# Patient Record
Sex: MALE | Race: WHITE | NOT HISPANIC OR LATINO | Employment: FULL TIME | URBAN - METROPOLITAN AREA
[De-identification: names, ages, dates, MRNs, and addresses within clinical notes are randomized per-mention and may not be internally consistent; named-entity substitution may affect disease eponyms.]

---

## 2018-03-07 ENCOUNTER — OFFICE VISIT (OUTPATIENT)
Dept: URGENT CARE | Facility: RETAIL CLINIC | Age: 58
End: 2018-03-07

## 2018-03-07 VITALS
OXYGEN SATURATION: 96 % | DIASTOLIC BLOOD PRESSURE: 102 MMHG | SYSTOLIC BLOOD PRESSURE: 162 MMHG | TEMPERATURE: 97.8 F | HEART RATE: 81 BPM

## 2018-03-07 DIAGNOSIS — R03.0 ELEVATED BLOOD PRESSURE READING: ICD-10-CM

## 2018-03-07 DIAGNOSIS — J20.9 ACUTE BRONCHITIS WITH SYMPTOMS > 10 DAYS: Primary | ICD-10-CM

## 2018-03-07 DIAGNOSIS — J98.01 ACUTE BRONCHOSPASM: ICD-10-CM

## 2018-03-07 PROCEDURE — 99203 OFFICE O/P NEW LOW 30 MIN: CPT | Performed by: PHYSICIAN ASSISTANT

## 2018-03-07 RX ORDER — PREDNISONE 20 MG/1
40 TABLET ORAL DAILY
Qty: 10 TABLET | Refills: 0 | Status: SHIPPED | OUTPATIENT
Start: 2018-03-07 | End: 2018-03-12

## 2018-03-07 RX ORDER — ALBUTEROL SULFATE 90 UG/1
1-2 AEROSOL, METERED RESPIRATORY (INHALATION) EVERY 4 HOURS PRN
Qty: 1 INHALER | Refills: 0 | Status: SHIPPED | OUTPATIENT
Start: 2018-03-07 | End: 2018-07-11

## 2018-03-07 RX ORDER — DOXYCYCLINE 100 MG/1
100 CAPSULE ORAL 2 TIMES DAILY
Qty: 14 CAPSULE | Refills: 0 | Status: SHIPPED | OUTPATIENT
Start: 2018-03-07 | End: 2018-03-14

## 2018-03-07 RX ORDER — CODEINE PHOSPHATE AND GUAIFENESIN 10; 100 MG/5ML; MG/5ML
1-2 SOLUTION ORAL EVERY 6 HOURS PRN
Qty: 120 ML | Refills: 0 | Status: SHIPPED | OUTPATIENT
Start: 2018-03-07 | End: 2018-04-10

## 2018-03-07 NOTE — MR AVS SNAPSHOT
"              After Visit Summary   3/7/2018    Alexsander Sin    MRN: [id number]           Patient Information     Date Of Birth          1960        Visit Information        Provider Department      3/7/2018 5:20 PM Pao Gauthier PA-C [facility]        Today's Diagnoses     Acute bronchitis with symptoms > 10 days    -  1    Acute bronchospasm        Elevated blood pressure reading          Care Instructions    1. Take antibiotic as directed  2. Take oral steroid/ prednisone 2 tablets once a day 5 days- start tonight, then move dose to tomorrow morning - Take with food  3. Use albuterol inhaler as needed for wheezing  4. Codeine cough suppressant - do not drive for 6 hrs after taking  Fluids, rest, cough drops, humidifier, over the counter pain relievers as needed  Space ibuprofen/Advilaleve/aspirin at least 2 hrs apart from prednisone to avoid any stomach upset/GI issues  Please follow up with primary care provider if not improving, worsening or new symptoms or for any adverse reactions to medications.   May need chest xray if persistent symptoms.           Follow-ups after your visit        Who to contact     You can reach your care team any time of the day by calling [phone].  Notification of test results:  If you have an abnormal lab result, we will notify you by phone as soon as possible.         Additional Information About Your Visit        Apps [employer] Information     [employer] lets you send messages to your doctor, view your test results, renew your prescriptions, schedule appointments and more. To sign up, go to [email or web address] . Click on \"Log in\" on the left side of the screen, which will take you to the Welcome page. Then click on \"Sign up Now\" on the right side of the page.     You will be asked to enter the access code listed below, as well as some personal information. Please follow the directions to create your username and password.     Your access code " is: [email or web address]  Expires: 2018  6:02 PM     Your access code will  in 90 days. If you need help or a new code, please call your [place] clinic or [phone].        Care EveryWhere ID     This is your Care EveryWhere ID. This could be used by other organizations to access your [place] medical records  [id number]        Your Vitals Were     Pulse Temperature Pulse Oximetry             81 97.8  F (36.6  C) (Temporal) 96%          Blood Pressure from Last 3 Encounters:   18 (!) 162/102   12 124/72    Weight from Last 3 Encounters:   12 213 lb (96.6 kg)              Today, you had the following     No orders found for display         Today's Medication Changes          These changes are accurate as of 3/7/18  6:03 PM.  If you have any questions, ask your nurse or doctor.               Start taking these medicines.        Dose/Directions    albuterol 108 (90 BASE) MCG/ACT Inhaler   Commonly known as:  PROAIR HFA/PROVENTIL HFA/VENTOLIN HFA   Used for:  Acute bronchospasm        Dose:  1-2 puff   Inhale 1-2 puffs into the lungs every 4 hours as needed For wheezing   Quantity:  1 Inhaler   Refills:  0       doxycycline 100 MG capsule   Commonly known as:  VIBRAMYCIN   Used for:  Acute bronchitis with symptoms > 10 days        Dose:  100 mg   Take 1 capsule (100 mg) by mouth 2 times daily for 7 days   Quantity:  14 capsule   Refills:  0       guaiFENesin-codeine 100-10 MG/5ML Soln solution   Commonly known as:  ROBITUSSIN AC   Used for:  Acute bronchospasm        Dose:  1-2 tsp.   Take 5-10 mLs by mouth every 6 hours as needed for cough   Quantity:  120 mL   Refills:  0       predniSONE 20 MG tablet   Commonly known as:  DELTASONE   Used for:  Acute bronchitis with symptoms > 10 days        Dose:  40 mg   Take 2 tablets (40 mg) by mouth daily for 5 days   Quantity:  10 tablet   Refills:  0            Where to get your medicines      These medications were sent to [id number] [place], [place]  - [address] [facility]  [address] [place] [id number]    Hours:  Formerly Delicia - alex unchanged   9/8/03 kr Phone:  [phone]     albuterol 108 (90 BASE) MCG/ACT Inhaler    doxycycline 100 MG capsule    predniSONE 20 MG tablet         Some of these will need a paper prescription and others can be bought over the counter.  Ask your nurse if you have questions.     Bring a paper prescription for each of these medications     guaiFENesin-codeine 100-10 MG/5ML Soln solution                Primary Care Provider Fax #    Physician No Ref-Primary [phone]       No address on file        Equal Access to Services     [facility]: Hadii nickie gold Sodesi, waaxda luqadaha, qaybta kaalmada rubin, alfonzo robles . So [facility] [phone].    ATENCIÓN: Si habla español, tiene a jackson disposición servicios gratuitos de asistencia lingüística. [facility] [phone].    We comply with applicable federal civil rights laws and [place] laws. We do not discriminate on the basis of race, color, national origin, age, disability, sex, sexual orientation, or gender identity.            Thank you!     Thank you for choosing [facility]  for your care. Our goal is always to provide you with excellent care. Hearing back from our patients is one way we can continue to improve our services. Please take a few minutes to complete the written survey that you may receive in the mail after your visit with us. Thank you!             Your Updated Medication List - Protect others around you: Learn how to safely use, store and throw away your medicines at www.disposemymeds.org.          This list is accurate as of 3/7/18  6:03 PM.  Always use your most recent med list.                   Brand Name Dispense Instructions for use Diagnosis    albuterol 108 (90 BASE) MCG/ACT Inhaler    PROAIR HFA/PROVENTIL HFA/VENTOLIN HFA    1 Inhaler    Inhale 1-2 puffs into the lungs every 4 hours as needed  For wheezing    Acute bronchospasm       doxycycline 100 MG capsule    VIBRAMYCIN    14 capsule    Take 1 capsule (100 mg) by mouth 2 times daily for 7 days    Acute bronchitis with symptoms > 10 days       guaiFENesin-codeine 100-10 MG/5ML Soln solution    ROBITUSSIN AC    120 mL    Take 5-10 mLs by mouth every 6 hours as needed for cough    Acute bronchospasm       lisinopril 10 MG tablet    PRINIVIL/ZESTRIL     Take 10 mg by mouth daily.        predniSONE 20 MG tablet    DELTASONE    10 tablet    Take 2 tablets (40 mg) by mouth daily for 5 days    Acute bronchitis with symptoms > 10 days

## 2018-03-07 NOTE — PROGRESS NOTES
Chief Complaint   Patient presents with     Cough     x 2 weeks, having a hard time breathing, productive cough, green yellow phlegm, cough keeping him up at night, no fevers     SUBJECTIVE:  Alexsander Sin is a 57 year old male who presents to the clinic today with a chief complaint of cough  for 2 week(s).  His cough is described as persistent.    The patient's symptoms are moderate and worsening.  Associated symptoms include headache, wheezing intermittently. The patient's symptoms are exacerbated by lying down  Patient has been using OTC cough suppressants  to improve symptoms.  HTN - was on lisinopril in past, has been in to [facility] for awhile to refill meds    Past Medical History:   Diagnosis Date     Hypertension        Meds - none       Allergies   Allergen Reactions     Sulfa Drugs         History   Smoking Status     Never Smoker   Smokeless Tobacco     Not on file       ROS  CONSTITUTIONAL:NEGATIVE for fever, chills, change in weight  ENT/MOUTH: NEGATIVE for ear, mouth and throat problems  RESP:POSITIVE for cough-productive and wheezing    OBJECTIVE:  BP (!) 162/102  Pulse 81  Temp 97.8  F (36.6  C) (Temporal)  SpO2 96%  GENERAL APPEARANCE: alert, mildly ill appearing  EYES:conjunctiva clear  HENT: ear canals and TM's normal.  Nose and mouth without ulcers, erythema or lesions  NECK: supple, nontender, no lymphadenopathy  RESP: upper lobes inspir wheezing and exp rhonchi, no rales  CV: regular rates and rhythm, normal S1 S2, no murmur noted  SKIN: no suspicious lesions or rashes    ASSESSMENT:    (J20.9) Acute bronchitis with symptoms > 10 days  (primary encounter diagnosis)  (J98.01) Acute bronchospasm  (R03.0) Elevated blood pressure reading    PLAN:   predniSONE (DELTASONE) 20 MG tablet,   doxycycline (VIBRAMYCIN) 100 MG capsule   albuterol (PROAIR HFA/PROVENTIL HFA/VENTOLIN HFA) MCG/ACT Inhaler,   guaiFENesin-codeine (ROBITUSSIN AC) 100-10 MG/5ML SOLN solution  1. Take antibiotic as directed  2.  Take oral steroid/ prednisone 2 tablets once a day 5 days- start tonight, then move dose to tomorrow morning - Take with food  3. Use albuterol inhaler as needed for wheezing  4. Codeine cough suppressant - do not drive for 6 hrs after taking  Fluids, rest, cough drops, humidifier, over the counter pain relievers as needed  Space ibuprofen/Advilaleve/aspirin at least 2 hrs apart from prednisone to avoid any stomach upset/GI issues  Please follow up with primary care provider if not improving, worsening or new symptoms or for any adverse reactions to medications.   May need chest xray if persistent symptoms.    Discussed seeing PCP and restarting lisinopril    Pao Gauthier PA-C  [facility]

## 2018-03-07 NOTE — PATIENT INSTRUCTIONS
1. Take antibiotic as directed  2. Take oral steroid/ prednisone 2 tablets once a day 5 days- start tonight, then move dose to tomorrow morning - Take with food  3. Use albuterol inhaler as needed for wheezing  4. Codeine cough suppressant - do not drive for 6 hrs after taking  Fluids, rest, cough drops, humidifier, over the counter pain relievers as needed  Space ibuprofen/Advilaleve/aspirin at least 2 hrs apart from prednisone to avoid any stomach upset/GI issues  Please follow up with primary care provider if not improving, worsening or new symptoms or for any adverse reactions to medications.   May need chest xray if persistent symptoms.

## 2018-03-07 NOTE — NURSING NOTE
"Chief Complaint   Patient presents with     Cough     x 2 weeks, having a hard time breathing, productive cough, green yellow phlegm, cough keeping him up at night, no fevers       Initial BP (!) 171/98 (BP Location: Left arm)  Pulse 81  Temp 97.8  F (36.6  C) (Temporal)  SpO2 96% Estimated body mass index is 30.56 kg/(m^2) as calculated from the following:    Height as of 1/23/12: 5' 10\" (1.778 m).    Weight as of 1/23/12: 213 lb (96.6 kg).  Medication Reconciliation: complete    "

## 2018-03-18 ENCOUNTER — TELEPHONE (OUTPATIENT)
Dept: URGENT CARE | Facility: RETAIL CLINIC | Age: 58
End: 2018-03-18

## 2018-03-18 NOTE — TELEPHONE ENCOUNTER
"PATIENT SIGNED IN TO [facility] THIS MORNING FOR COUGH, WHICH HE INFORMED ME THAT HE WAS VERY RECENTLY TREATED FOR AND HAS NOT IMPROVED.   I ADVISED HIM TO BE SEEN AT A HIGHER CARE FACILITY SUCH AS AN URGENT CARE AS WE ARE NOT ABLE TO FURTHER TREAT HIM AT [facility].   AS HE WALKED AWAY, HE STATED \"NO, THAT MAYBE IT WILL GO AWAY ON ITS OWN.\"  "

## 2018-04-10 ENCOUNTER — OFFICE VISIT (OUTPATIENT)
Dept: INTERNAL MEDICINE | Facility: CLINIC | Age: 58
End: 2018-04-10

## 2018-04-10 VITALS
RESPIRATION RATE: 16 BRPM | OXYGEN SATURATION: 96 % | HEIGHT: 70 IN | BODY MASS INDEX: 31.21 KG/M2 | WEIGHT: 218 LBS | SYSTOLIC BLOOD PRESSURE: 168 MMHG | DIASTOLIC BLOOD PRESSURE: 104 MMHG | TEMPERATURE: 97.5 F | HEART RATE: 84 BPM

## 2018-04-10 DIAGNOSIS — J40 BRONCHITIS: ICD-10-CM

## 2018-04-10 DIAGNOSIS — R05.3 CHRONIC COUGH: Primary | ICD-10-CM

## 2018-04-10 DIAGNOSIS — I10 BENIGN ESSENTIAL HYPERTENSION: ICD-10-CM

## 2018-04-10 PROCEDURE — 99213 OFFICE O/P EST LOW 20 MIN: CPT | Performed by: INTERNAL MEDICINE

## 2018-04-10 RX ORDER — AZITHROMYCIN 250 MG/1
TABLET, FILM COATED ORAL
Qty: 6 TABLET | Refills: 0 | Status: SHIPPED | OUTPATIENT
Start: 2018-04-10 | End: 2018-06-13

## 2018-04-10 RX ORDER — LISINOPRIL 20 MG/1
20 TABLET ORAL DAILY
COMMUNITY
End: 2018-04-10

## 2018-04-10 RX ORDER — LISINOPRIL 20 MG/1
30 TABLET ORAL DAILY
Qty: 45 TABLET | Refills: 3 | Status: SHIPPED | OUTPATIENT
Start: 2018-04-10 | End: 2018-06-13

## 2018-04-10 RX ORDER — PREDNISONE 20 MG/1
TABLET ORAL
Qty: 15 TABLET | Refills: 0 | Status: SHIPPED | OUTPATIENT
Start: 2018-04-10 | End: 2018-06-13

## 2018-04-10 ASSESSMENT — PAIN SCALES - GENERAL: PAINLEVEL: NO PAIN (0)

## 2018-04-10 NOTE — MR AVS SNAPSHOT
"              After Visit Summary   4/10/2018    Alexsander Sin    MRN: [id number]           Patient Information     Date Of Birth          1960        Visit Information        Provider Department      4/10/2018 2:00 PM Jarvis Bahena MD [facility]         Follow-ups after your visit        Who to contact     If you have questions or need follow up information about today's clinic visit or your schedule please contact [facility] directly at [phone].  Normal or non-critical lab and imaging results will be communicated to you by MyChart, letter or phone within 4 business days after the clinic has received the results. If you do not hear from us within 7 days, please contact the clinic through [employer] or phone. If you have a critical or abnormal lab result, we will notify you by phone as soon as possible.  Submit refill requests through [employer] or call your pharmacy and they will forward the refill request to us. Please allow 3 business days for your refill to be completed.          Additional Information About Your Visit        [facility] Information     [employer] lets you send messages to your doctor, view your test results, renew your prescriptions, schedule appointments and more. To sign up, go to [email or web address] . Click on \"Log in\" on the left side of the screen, which will take you to the Welcome page. Then click on \"Sign up Now\" on the right side of the page.     You will be asked to enter the access code listed below, as well as some personal information. Please follow the directions to create your username and password.     Your access code is: [email or web address]  Expires: 2018  7:02 PM     Your access code will  in 90 days. If you need help or a new code, please call your [facility] or [phone].        Care EveryWhere ID     This is your Care EveryWhere ID. This could be used by other organizations to access your [place] medical " "records  [id number]        Your Vitals Were     Pulse Temperature Respirations Height Pulse Oximetry BMI (Body Mass Index)    84 97.5  F (36.4  C) (Temporal) 16 5' 10\" (1.778 m) 96% 31.28 kg/m2       Blood Pressure from Last 3 Encounters:   04/10/18 (!) 168/104   03/07/18 (!) 162/102   01/23/12 124/72    Weight from Last 3 Encounters:   04/10/18 218 lb (98.9 kg)   01/23/12 213 lb (96.6 kg)              Today, you had the following     No orders found for display         Today's Medication Changes          These changes are accurate as of 4/10/18  2:05 PM.  If you have any questions, ask your nurse or doctor.               These medicines have changed or have updated prescriptions.        Dose/Directions    lisinopril 20 MG tablet   Commonly known as:  PRINIVIL/ZESTRIL   This may have changed:  Another medication with the same name was removed. Continue taking this medication, and follow the directions you see here.   Changed by:  Jarvis Bahena MD        Dose:  20 mg   Take 20 mg by mouth daily   Refills:  0                Primary Care Provider Fax #    Physician No Ref-Primary [phone]       No address on file        Equal Access to Services     OLESYA GORMAN : Chana meyero Sodesi, waaxda luqadaha, qaybta kaalmada adeegyada, alfonzo carrion. So [facility] [phone].    ATENCIÓN: Si habla español, tiene a jackson disposición servicios gratuitos de asistencia lingüística. Llame al [phone].    We comply with applicable federal civil rights laws and [place] laws. We do not discriminate on the basis of race, color, national origin, age, disability, sex, sexual orientation, or gender identity.            Thank you!     Thank you for choosing [facility]  for your care. Our goal is always to provide you with excellent care. Hearing back from our patients is one way we can continue to improve our services. Please take a few minutes to complete the written survey that " you may receive in the mail after your visit with us. Thank you!             Your Updated Medication List - Protect others around you: Learn how to safely use, store and throw away your medicines at www.disposemymeds.org.          This list is accurate as of 4/10/18  2:05 PM.  Always use your most recent med list.                   Brand Name Dispense Instructions for use Diagnosis    albuterol 108 (90 Base) MCG/ACT Inhaler    PROAIR HFA/PROVENTIL HFA/VENTOLIN HFA    1 Inhaler    Inhale 1-2 puffs into the lungs every 4 hours as needed For wheezing    Acute bronchospasm       GAVISCON PO      Take by mouth daily as needed        lisinopril 20 MG tablet    PRINIVIL/ZESTRIL     Take 20 mg by mouth daily        ROBITUSSIN DM PO      Take by mouth as needed

## 2018-04-10 NOTE — PROGRESS NOTES
"  SUBJECTIVE:   Alexsander Sin is a 57 year old male who presents to clinic today for the following health issues:    Chief Complaint   Patient presents with     Cough     follow up on chronic cough     Throat Problem     horseness     He has some bronchitis and cough ongoing.      He has high blood pressure and was on lisinopril before, still on it now, needs some refill.  Checking bp at home and is better there.   Tried stopping it once and cough didn't get better.     Cough since early December, first with nose congestion, that cleared up in mid January, cough continued.  February had more chest congestion, [facility]ine and prednisone and inhaler, didn't do much for him.      [facility] did chest xray and more medrol dose pack.  Got a little better.      Still nagging and hard to sleep. Some foamy white phelgm, not the yellow or green.     Some issues with eating and coughing as well.  Tried some antacids.     Past Medical History:   Diagnosis Date     Hypertension      Current Outpatient Prescriptions   Medication     Alum Hydroxide-Mag Carbonate (GAVISCON PO)     Dextromethorphan-Guaifenesin (ROBITUSSIN DM PO)     azithromycin (ZITHROMAX) 250 MG tablet     predniSONE (DELTASONE) 20 MG tablet     lisinopril (PRINIVIL/ZESTRIL) 20 MG tablet     albuterol (PROAIR HFA/PROVENTIL HFA/VENTOLIN HFA) 108 (90 BASE) MCG/ACT Inhaler     [DISCONTINUED] lisinopril (PRINIVIL/ZESTRIL) 20 MG tablet     [DISCONTINUED] lisinopril (PRINIVIL,ZESTRIL) 10 MG tablet     No current facility-administered medications for this visit.      Physical Exam  BP (!) 168/104  Pulse 84  Temp 97.5  F (36.4  C) (Temporal)  Resp 16  Ht 5' 10\" (1.778 m)  Wt 218 lb (98.9 kg)  SpO2 96%  BMI 31.28 kg/m2  General Appearance-healthy, alert, no distress  ENT-ENT exam normal, no neck nodes or sinus tenderness  Cardiac-regular rate and rhythm  with normal S1, S2 ; no murmur, rub or gallops  Lungs-mild " to moderate expiratory wheezes    ASSESSMENT:  57-year-old gentleman who has had a lot of secondhand smoke, does not smoke himself.  He has had cough and bronchitis symptoms since December.  He has been on doxycycline once, prednisone twice.  He had a negative chest x-ray which is reviewed from [facility].  He still has expiratory wheezing on examination.  He has family history of colon cancer, esophageal cancer, and lung cancer.  I told he is at risk for other illnesses.  I will treat him today for bronchitis with Z-Heri, prednisone, continue albuterol.  He should follow-up in the future for formal PFTs, screening colonoscopy, possible CT and endoscopy.      Electronically signed by Jarvis Bahena MD

## 2018-06-13 ENCOUNTER — OFFICE VISIT (OUTPATIENT)
Dept: INTERNAL MEDICINE | Facility: CLINIC | Age: 58
End: 2018-06-13

## 2018-06-13 VITALS
DIASTOLIC BLOOD PRESSURE: 80 MMHG | TEMPERATURE: 97.2 F | BODY MASS INDEX: 31.14 KG/M2 | WEIGHT: 217 LBS | OXYGEN SATURATION: 99 % | HEART RATE: 94 BPM | RESPIRATION RATE: 16 BRPM | SYSTOLIC BLOOD PRESSURE: 138 MMHG

## 2018-06-13 DIAGNOSIS — L98.9 SKIN LESION: ICD-10-CM

## 2018-06-13 DIAGNOSIS — I10 BENIGN ESSENTIAL HYPERTENSION: Primary | ICD-10-CM

## 2018-06-13 DIAGNOSIS — I83.93 VARICOSE VEINS OF BOTH LOWER EXTREMITIES: ICD-10-CM

## 2018-06-13 DIAGNOSIS — K21.9 GASTROESOPHAGEAL REFLUX DISEASE WITHOUT ESOPHAGITIS: ICD-10-CM

## 2018-06-13 DIAGNOSIS — R05.9 COUGH: ICD-10-CM

## 2018-06-13 PROCEDURE — 99214 OFFICE O/P EST MOD 30 MIN: CPT | Performed by: INTERNAL MEDICINE

## 2018-06-13 RX ORDER — LISINOPRIL 40 MG/1
40 TABLET ORAL DAILY
Qty: 90 TABLET | Refills: 3 | Status: SHIPPED | OUTPATIENT
Start: 2018-06-13 | End: 2020-01-15

## 2018-06-13 RX ORDER — OMEPRAZOLE 40 MG/1
40 CAPSULE, DELAYED RELEASE ORAL DAILY
Qty: 30 CAPSULE | Refills: 1 | Status: SHIPPED | OUTPATIENT
Start: 2018-06-13 | End: 2018-08-26

## 2018-06-13 ASSESSMENT — PAIN SCALES - GENERAL: PAINLEVEL: NO PAIN (0)

## 2018-06-13 NOTE — PROGRESS NOTES
SUBJECTIVE:   Alexsander Sin is a 57 year old male who presents to clinic today for the following health issues:    Chief Complaint   Patient presents with     Hypertension     f/u     Cough     Varicose Vein     check varicose vein left leg     Derm Problem     check spot back of right leg     He does have bp med refill.      Varicose veins are better    Right calf skin lesion for 2 years.      Lisinopril at 1.5 tablets and doing ok.  BP had been good at home.    Cough at night.  Doesn't smoke, wife smokes.  Dry cough.  Feels some reflux and burping as well. Tried some gaviscon and omeprazole.        Past Medical History:   Diagnosis Date     Hypertension      Current Outpatient Prescriptions   Medication     albuterol (PROAIR HFA/PROVENTIL HFA/VENTOLIN HFA) 108 (90 BASE) MCG/ACT Inhaler     Alum Hydroxide-Mag Carbonate (GAVISCON PO)     lisinopril (PRINIVIL/ZESTRIL) 40 MG tablet     omeprazole (PRILOSEC) 40 MG capsule     [DISCONTINUED] lisinopril (PRINIVIL/ZESTRIL) 20 MG tablet     No current facility-administered medications for this visit.      Social History   Substance Use Topics     Smoking status: Never Smoker     Smokeless tobacco: Never Used     Alcohol use Yes      Comment: rarely     Review of Systems  Constitutional-No fevers, chills, or weight changes..  Cardiac-No chest pain or palpitations.  Respiratory-Cough without sputum.  GI-No nausea, vomitting, diarrhea, constipation, or blood in the stool but does have reflux.  Skin-lesion on right calf..    Physical Exam  /80  Pulse 94  Temp 97.2  F (36.2  C) (Temporal)  Resp 16  Wt 217 lb (98.4 kg)  SpO2 99%  BMI 31.14 kg/m2  General Appearance-healthy, alert, no distress  Cardiac-regular rate and rhythm  with normal S1, S2 ; no murmur, rub or gallops  Lungs-clear to auscultation  Extremities-no peripheral edema, peripheral pulses normal  Skin-2 cm lesion, raised, rough on the back of the right calf  Small varicose veins on both lower  extremities.    ASSESSMENT:  57-year-old gentleman comes in with multiple medical concerns today.    Problem 1 hypertension on a recheck.  He is taking lisinopril 30 mg a day initially his blood pressure is 158 on recheck 138.  We will increase his lisinopril to 40 mg for the last take 1 pill a day.    Problem #2- cough patient does not appear to have bronchitis anymore.  He is around a lot of smoke from his wife.  His lungs are clear.  This could be related to reflux disease or lung disease.  We will give him omeprazole 40 mg a day for reflux and he can take albuterol as needed.    Problem #3 reflux disease we will treat him with omeprazole 40 mg a day.    Problem #4 right skin lesion on the back of the leg.  This is 2 cm in size it has been there for multiple years.  It is irritated at this point but not infected.  I recommend he have general surgery remove this in case it could be a malignancy.  He would like to wait on this for now but will call him when he can do it.    Problem #5 varicose veins, probably had a superficial thrombophlebitis that now is gone,  Should use compression, elevation  Electronically signed by Jarvis Bahena MD

## 2018-07-06 ENCOUNTER — TELEPHONE (OUTPATIENT)
Dept: INTERNAL MEDICINE | Facility: CLINIC | Age: 58
End: 2018-07-06

## 2018-07-06 NOTE — TELEPHONE ENCOUNTER
Aelxsander wanted to reach out to Dr Bahena and Tosin regarding his ongoing cough.  The first available appointment is not until 7/16/18 and he cannot sleep.  He has been seen previously with Dr Bahena for this and it is not getting better.    Anything that can be done in the interim?    Patient is aware Dr Bahena is out until Monday 7/9/18, but please call him as soon as possible then if able.  Thank you,  Marian DRISCOLL

## 2018-07-06 NOTE — TELEPHONE ENCOUNTER
Note from 6/13/18, PCP OV:  Problem #2- cough patient does not appear to have bronchitis anymore.  He is around a lot of smoke from his wife.  His lungs are clear.  This could be related to reflux disease or lung disease.  We will give him omeprazole 40 mg a day for reflux and he can take albuterol as needed.    Patient is called and is reporting that his coughing at night is getting worse.  He states he has to sit up in the middle of the night, but continues to cough.  He states he is using his inhaler and it does give him some relief for about 4 hours.  He is still around second-hand smoke.  He states he believes he will have insurance in August, so was trying to hold off until then, but is thinking he will need to be seen and assessed before then as he is getting worse.  He is coughing up white/clear phlegm but sometimes coughs up some green/yellow from deep in his lugs.      He is taking his Omeprazole and Gaviscon, but states he thinks his stomach is hard to the touch and bloated.  He has been limiting spicy foods, which helps.    He would like to know if he can be seen next week as his symptoms are worsening, and based on what tests PCP would liek to have completed, he will price out and proceed accordingly.  He is aware that PCP is out of office until Monday, 7/9/18, and will go to the ED if symptoms worsen or become too much to handle.  Otherwise he will wait to see what PCP would like to do from here.  Routing to PCP for further advice.    Cathryn Zendejas RN

## 2018-07-08 NOTE — TELEPHONE ENCOUNTER
Yes we can see him at doc only if needed or acute, probably need a chest, abd ct for the  Cough and hard abd.

## 2018-07-10 ENCOUNTER — ALLIED HEALTH/NURSE VISIT (OUTPATIENT)
Dept: FAMILY MEDICINE | Facility: CLINIC | Age: 58
End: 2018-07-10

## 2018-07-10 ENCOUNTER — OFFICE VISIT (OUTPATIENT)
Dept: INTERNAL MEDICINE | Facility: CLINIC | Age: 58
End: 2018-07-10

## 2018-07-10 VITALS
SYSTOLIC BLOOD PRESSURE: 144 MMHG | HEART RATE: 90 BPM | WEIGHT: 213 LBS | OXYGEN SATURATION: 94 % | RESPIRATION RATE: 16 BRPM | DIASTOLIC BLOOD PRESSURE: 86 MMHG | TEMPERATURE: 97.5 F | BODY MASS INDEX: 30.56 KG/M2

## 2018-07-10 DIAGNOSIS — Z51.89 ENCOUNTER FOR WOUND CARE: Primary | ICD-10-CM

## 2018-07-10 DIAGNOSIS — R05.9 COUGH: Primary | ICD-10-CM

## 2018-07-10 DIAGNOSIS — R11.0 NAUSEA: ICD-10-CM

## 2018-07-10 DIAGNOSIS — R06.02 SOB (SHORTNESS OF BREATH): ICD-10-CM

## 2018-07-10 PROCEDURE — 99207 ZZC NO CHARGE NURSE ONLY: CPT

## 2018-07-10 PROCEDURE — 99214 OFFICE O/P EST MOD 30 MIN: CPT | Performed by: INTERNAL MEDICINE

## 2018-07-10 ASSESSMENT — PAIN SCALES - GENERAL: PAINLEVEL: NO PAIN (0)

## 2018-07-10 NOTE — PROGRESS NOTES
SUBJECTIVE:   Alexsander Sin is a 57 year old male who presents to clinic today for the following health issues:    Chief Complaint   Patient presents with     Cough     discuss ongoing cough       Cough and sob still going on, can't sleep with the cough.  Spitting up stuff, now more green stuff.      Bending over loses his breath and eating then stomach is full and hard, then gets coughing attacks.      Albuterol helps him, some needs it more.      Patient denies sweats, has had 4 pounds of weight loss.  He denies chest pain, mild constipation but no diarrhea, no skin rashes.    His wife has chronic lung disease and emphysema and continues to smoke.  She also has lupus.  She is getting a CAT scan for possible hemoptysis.    Past Medical History:   Diagnosis Date     Hypertension      Current Outpatient Prescriptions   Medication     albuterol (PROAIR HFA/PROVENTIL HFA/VENTOLIN HFA) 108 (90 BASE) MCG/ACT Inhaler     lisinopril (PRINIVIL/ZESTRIL) 40 MG tablet     omeprazole (PRILOSEC) 40 MG capsule     No current facility-administered medications for this visit.      Social History   Substance Use Topics     Smoking status: Never Smoker     Smokeless tobacco: Never Used     Alcohol use Yes      Comment: rarely     Review of Systems  Constitutional-No fevers, chills, or weight changes..  ENT-No earpain, sore throat, voice changes or rhinitis.  Cardiac-No chest pain or palpitations and Exertional SOB.  Respiratory-Sputum production.  GI-No nausea, vomitting, diarrhea, constipation, or blood in the stool.  Musculoskeletal-No muscles aches or joint pains.    Physical Exam  /86  Pulse 90  Temp 97.5  F (36.4  C) (Temporal)  Resp 16  Wt 213 lb (96.6 kg)  SpO2 94%  BMI 30.56 kg/m2  General Appearance-healthy, alert, no distress  ENT-ENT exam normal, no neck nodes or sinus tenderness  Cardiac-regular rate and rhythm  with normal S1, S2 ; no murmur, rub or gallops  Lungs-mild to moderate decreased air movement  and mild to moderate expiratory wheezes  GI-Soft, nontender.  Normal bowel sounds.  No hepatosplenomegaly or abnormal masses  Extremities-no peripheral edema, peripheral pulses normal    ASSESSMENT:  This is a 57-year-old gentleman who has hypertension, he is normally very healthy.  Never had really any colds or flus or pneumonias.  He was seen in March with a bronchitis.  Given doxycycline.  I saw him again with continued cough and he did repeat a Z-Heri.  He got a little bit better.  His blood pressure was better on lisinopril.  He still had some irritating cough he was given omeprazole in case of reflux disease.    Unfortunately his cough continues it is quite severe along with sputum, green and yellow at times.  This is a debilitating cough.  He has limited exercise capacity as well.  On exam he has mild expiratory wheezes he seems to have black breath sounds in all quadrants of his lungs.    I am worried about some structural disease and I think he needs a CT scan to make sure he does not have a lung mass or tumor.  This also look for any types of pneumonia, possible tuberculosis, or pleural effusion.  The patient does get more short of breath when he leans over and he has some reflux and stomach symptoms.  Therefore I will extend the CT scan to include his abdomen.  He can continue the albuterol for now.  If his imaging is completely normal I would then treat him with prednisone and repeat antibiotic course.  We will eventually try to get pulmonary function test done on him.    Electronically signed by Jarvis Bahena MD

## 2018-07-10 NOTE — MR AVS SNAPSHOT
"              After Visit Summary   7/10/2018    Alexsander Sin    MRN: [id number]           Patient Information     Date Of Birth          1960        Visit Information        Provider Department      7/10/2018 2:15 PM Jarvis Bahena MD [facility]         Follow-ups after your visit        Your next 10 appointments already scheduled     Jul 10, 2018  3:00 PM CDT   Nurse [facility] with  [facility]SE   [facility]    [address] [place] [address] [id number] [phone]            Jul 16, 2018  9:15 AM CDT   SHORT with Jarvis Bahena MD   [facility]    [address] [place] [id number] [phone]              Who to contact     If you have questions or need follow up information about today's clinic visit or your schedule please contact [facility] directly at [phone].  Normal or non-critical lab and imaging results will be communicated to you by REM ENTER[employer]ISEhart, letter or phone within 4 business days after the clinic has received the results. If you do not hear from us within 7 days, please contact the clinic through REM ENTER[employer]ISEhart or phone. If you have a critical or abnormal lab result, we will notify you by phone as soon as possible.  Submit refill requests through [employer] or call your pharmacy and they will forward the refill request to us. Please allow 3 business days for your refill to be completed.          Additional Information About Your Visit        REM ENTERPRISEhart Information     [employer] lets you send messages to your doctor, view your test results, renew your prescriptions, schedule appointments and more. To sign up, go to [email or web address] . Click on \"Log in\" on the left side of the screen, which will take you to the Welcome page. Then click on \"Sign up Now\" on the right side of the page.     You will be asked to enter the access code listed below, as well as some personal " information. Please follow the directions to create your username and password.     Your access code is: [email or web address]  Expires: 2018  8:20 AM     Your access code will  in 90 days. If you need help or a new code, please call your [facility] clinic or [phone].        Care EveryWhere ID     This is your Care EveryWhere ID. This could be used by other organizations to access your [facility] medical records  [id number]        Your Vitals Were     Pulse Temperature Respirations Pulse Oximetry BMI (Body Mass Index)       90 97.5  F (36.4  C) (Temporal) 16 94% 30.56 kg/m2        Blood Pressure from Last 3 Encounters:   07/10/18 144/86   18 138/80   04/10/18 (!) 168/104    Weight from Last 3 Encounters:   07/10/18 213 lb (96.6 kg)   18 217 lb (98.4 kg)   04/10/18 218 lb (98.9 kg)              Today, you had the following     No orders found for display       Primary Care Provider Office Phone # Fax #    Jarvis Bahena [phone]       [address] [facility] [address]        Equal Access to Services     OLESYA GORMAN : Hadii nickie gregory hadasho Soomaali, waaxda luqadaha, qaybta kaalmada adeegyada, alfonzo carrion. So [facility] [phone].    ATENCIÓN: Si habla español, tiene a jackson disposición servicios gratuitos de asistencia lingüística. Llame al [phone].    We comply with applicable federal civil rights laws and [place] laws. We do not discriminate on the basis of race, color, national origin, age, disability, sex, sexual orientation, or gender identity.            Thank you!     Thank you for choosing [facility]  for your care. Our goal is always to provide you with excellent care. Hearing back from our patients is one way we can continue to improve our services. Please take a few minutes to complete the written survey that you may receive in the mail after your visit with us. Thank you!             Your Updated Medication List -  Protect others around you: Learn how to safely use, store and throw away your medicines at www.disposemymeds.org.          This list is accurate as of 7/10/18  2:22 PM.  Always use your most recent med list.                   Brand Name Dispense Instructions for use Diagnosis    albuterol 108 (90 Base) MCG/ACT Inhaler    PROAIR HFA/PROVENTIL HFA/VENTOLIN HFA    1 Inhaler    Inhale 1-2 puffs into the lungs every 4 hours as needed For wheezing    Acute bronchospasm       lisinopril 40 MG tablet    PRINIVIL/ZESTRIL    90 tablet    Take 1 tablet (40 mg) by mouth daily    Benign essential hypertension       omeprazole 40 MG capsule    priLOSEC    30 capsule    Take 1 capsule (40 mg) by mouth daily Take 30-60 minutes before a meal.    Gastroesophageal reflux disease without esophagitis

## 2018-07-10 NOTE — MR AVS SNAPSHOT
"              After Visit Summary   7/10/2018    Alexsander Sin    MRN: [id number]           Patient Information     Date Of Birth          1960        Visit Information        Provider Department      7/10/2018 3:00 PM PH NURSE [facility]        Today's Diagnoses     Encounter for wound care    -  1       Follow-ups after your visit        Your next 10 appointments already scheduled     Jul 16, 2018  9:15 AM CDT   SHORT with Jarvis Bahena MD   [facility]    [address] [place] [phone]   [phone]              Future tests that were ordered for you today     Open Future Orders        Priority Expected Expires Ordered    CT Chest Abdomen Pelvis w/o & w Contrast Routine  7/10/2019 7/10/2018            Who to contact     If you have questions or need follow up information about today's clinic visit or your schedule please contact [facility] directly at [phone].  Normal or non-critical lab and imaging results will be communicated to you by MyChart, letter or phone within 4 business days after the clinic has received the results. If you do not hear from us within 7 days, please contact the clinic through "[employer]hart or phone. If you have a critical or abnormal lab result, we will notify you by phone as soon as possible.  Submit refill requests through [employer] or call your pharmacy and they will forward the refill request to us. Please allow 3 business days for your refill to be completed.          Additional Information About Your Visit        [employer] Information     [employer] lets you send messages to your doctor, view your test results, renew your prescriptions, schedule appointments and more. To sign up, go to [email or web address] . Click on \"Log in\" on the left side of the screen, which will take you to the Welcome page. Then click on \"Sign up Now\" on the right side of the page.     You will be asked to enter the " access code listed below, as well as some personal information. Please follow the directions to create your username and password.     Your access code is: [email or web address]  Expires: 2018  8:20 AM     Your access code will  in 90 days. If you need help or a new code, please call your [facility] clinic or [phone].        Care EveryWhere ID     This is your Care EveryWhere ID. This could be used by other organizations to access your [facility] medical records  [id number]         Blood Pressure from Last 3 Encounters:   07/10/18 144/86   18 138/80   04/10/18 (!) 168/104    Weight from Last 3 Encounters:   07/10/18 213 lb (96.6 kg)   18 217 lb (98.4 kg)   04/10/18 218 lb (98.9 kg)              Today, you had the following     No orders found for display       Primary Care Provider Office Phone # Fax #    Jarvis Bahena [phone]        [facility] [address]        Equal Access to Services     [facility]: Hadii aad ku hadasho Soomaali, waaxda luqadaha, qaybta kaalmada adeegyada, alfonzo robles . So [facility] [phone].    ATENCIÓN: Si habla español, tiene a jackson disposición servicios gratuitos de asistencia lingüística. Justine al [phone].    We comply with applicable federal civil rights laws and [place] laws. We do not discriminate on the basis of race, color, national origin, age, disability, sex, sexual orientation, or gender identity.            Thank you!     Thank you for choosing [facility]  for your care. Our goal is always to provide you with excellent care. Hearing back from our patients is one way we can continue to improve our services. Please take a few minutes to complete the written survey that you may receive in the mail after your visit with us. Thank you!             Your Updated Medication List - Protect others around you: Learn how to safely use, store and throw away your medicines at  www.disposemymeds.org.          This list is accurate as of 7/10/18  3:38 PM.  Always use your most recent med list.                   Brand Name Dispense Instructions for use Diagnosis    albuterol 108 (90 Base) MCG/ACT Inhaler    PROAIR HFA/PROVENTIL HFA/VENTOLIN HFA    1 Inhaler    Inhale 1-2 puffs into the lungs every 4 hours as needed For wheezing    Acute bronchospasm       lisinopril 40 MG tablet    PRINIVIL/ZESTRIL    90 tablet    Take 1 tablet (40 mg) by mouth daily    Benign essential hypertension       omeprazole 40 MG capsule    priLOSEC    30 capsule    Take 1 capsule (40 mg) by mouth daily Take 30-60 minutes before a meal.    Gastroesophageal reflux disease without esophagitis

## 2018-07-11 DIAGNOSIS — J98.01 ACUTE BRONCHOSPASM: ICD-10-CM

## 2018-07-11 RX ORDER — ALBUTEROL SULFATE 90 UG/1
1-2 AEROSOL, METERED RESPIRATORY (INHALATION) EVERY 4 HOURS PRN
Qty: 1 INHALER | Refills: 0 | Status: SHIPPED | OUTPATIENT
Start: 2018-07-11 | End: 2019-04-28

## 2018-07-11 NOTE — TELEPHONE ENCOUNTER
Per note from visit yesterday, okay to continue albuterol, refilled.     Izzy Fink RN. . .  7/11/2018, 4:12 PM

## 2018-07-11 NOTE — TELEPHONE ENCOUNTER
"Requested Prescriptions   Pending Prescriptions Disp Refills     albuterol (PROAIR HFA/PROVENTIL HFA/VENTOLIN HFA) 108 (90 Base) MCG/ACT Inhaler 1 Inhaler 0    Last Written Prescription Date:  03/07/2018  Last Fill Quantity: 1,  # refills: 0   Last office visit: 7/10/2018 with prescribing provider:  07/10/2018   Future Office Visit:   Next 5 appointments (look out 90 days)     Jul 16, 2018  9:15 AM CDT   SHORT with Jarvis Bahena MD   [facility]    [address] [place] [id number]   [phone]                  Sig: Inhale 1-2 puffs into the lungs every 4 hours as needed For wheezing    Asthma Maintenance Inhalers - Anticholinergics Passed    7/11/2018  4:00 PM       Passed - Patient is age 12 years or older       Passed - Recent (12 mo) or future (30 days) visit within the authorizing provider's specialty    Patient had office visit in the last 12 months or has a visit in the next 30 days with authorizing provider or within the authorizing provider's specialty.  See \"Patient Info\" tab in inbasket, or \"Choose Columns\" in Meds & Orders section of the refill encounter.              "

## 2018-07-13 ENCOUNTER — TELEPHONE (OUTPATIENT)
Dept: INTERNAL MEDICINE | Facility: CLINIC | Age: 58
End: 2018-07-13

## 2018-07-13 NOTE — TELEPHONE ENCOUNTER
Reason for Call:  Other     Detailed comments: Alexsander calls asking if the order for his CT scan can be [facility] [place] please.      Phone Number Patient can be reached at: Cell number on file:    Telephone Information:   Mobile [phone]       Best Time: any    Can we leave a detailed message on this number? YES    Call taken on 7/13/2018 at 10:05 AM by Swapna Ambriz

## 2018-07-17 ENCOUNTER — TRANSFERRED RECORDS (OUTPATIENT)
Dept: HEALTH INFORMATION MANAGEMENT | Facility: CLINIC | Age: 58
End: 2018-07-17

## 2018-07-20 ENCOUNTER — TELEPHONE (OUTPATIENT)
Dept: INTERNAL MEDICINE | Facility: CLINIC | Age: 58
End: 2018-07-20

## 2018-07-20 DIAGNOSIS — R05.9 COUGH: Primary | ICD-10-CM

## 2018-07-20 RX ORDER — PREDNISONE 20 MG/1
TABLET ORAL
Qty: 15 TABLET | Refills: 0 | Status: SHIPPED | OUTPATIENT
Start: 2018-07-20 | End: 2019-04-28

## 2018-07-20 NOTE — TELEPHONE ENCOUNTER
Received report from [facility].  Dr. Bahena reviewed report.  Per Dr. Bahena, CT was okay, one liver lesion was noted and one lesion on adrenal gland but radiologist though most likely benign.  Patient states cough is a little better but is losing voice a bit.  He is wondering doing another round of Amox and Prednisone would help

## 2018-07-24 NOTE — TELEPHONE ENCOUNTER
Pt was advised of the message from Dr. Bahena via phone message, pt advised to call back if he had any further questions.

## 2018-07-26 ENCOUNTER — OFFICE VISIT (OUTPATIENT)
Dept: ORTHOPEDICS | Facility: OTHER | Age: 58
End: 2018-07-26
Payer: COMMERCIAL

## 2018-07-26 VITALS
HEIGHT: 70 IN | WEIGHT: 212 LBS | SYSTOLIC BLOOD PRESSURE: 126 MMHG | BODY MASS INDEX: 30.35 KG/M2 | DIASTOLIC BLOOD PRESSURE: 80 MMHG

## 2018-07-26 DIAGNOSIS — S46.811A STRAIN OF RIGHT TRAPEZIUS MUSCLE, INITIAL ENCOUNTER: ICD-10-CM

## 2018-07-26 DIAGNOSIS — M79.10 MYALGIA: Primary | ICD-10-CM

## 2018-07-26 PROCEDURE — 99204 OFFICE O/P NEW MOD 45 MIN: CPT | Performed by: PHYSICAL MEDICINE & REHABILITATION

## 2018-07-26 RX ORDER — CYCLOBENZAPRINE HCL 5 MG
5 TABLET ORAL 3 TIMES DAILY PRN
Qty: 30 TABLET | Refills: 1 | Status: SHIPPED | OUTPATIENT
Start: 2018-07-26 | End: 2019-04-28

## 2018-07-26 NOTE — PATIENT INSTRUCTIONS
-Flexeril prescribed 5 mg every 8 hours as needed for muscle spasms/tightness.  Can cause sedation.  Do not take prior to driving.   -Ice or heat 15-20 minutes as needed (Avoid sleeping on a heating pad or ice)  -Patient's preferred over the counter medications as directed on packaging as needed for pain or soreness.  Please take ibuprofen with food.   -Over the counter lidocaine cream as needed (i.e. Aspercreme or generic equivalent)  -Recommend range of motion exercises multiple times a day.    -Follow up as needed if symptoms fail to improve or worsen.  Please call with questions or concerns.

## 2018-07-26 NOTE — MR AVS SNAPSHOT
After Visit Summary   7/26/2018    Alexsander Sin    MRN: [id number]           Patient Information     Date Of Birth          1960        Visit Information        Provider Department      7/26/2018 8:20 AM Felicitas Handy MD [facility]        Today's Diagnoses     Myalgia    -  1      Care Instructions    -Flexeril prescribed 5 mg every 8 hours as needed for muscle spasms/tightness.  Can cause sedation.  Do not take prior to driving.   -Ice or heat 15-20 minutes as needed (Avoid sleeping on a heating pad or ice)  -Patient's preferred over the counter medications as directed on packaging as needed for pain or soreness.  Please take ibuprofen with food.   -Over the counter lidocaine cream as needed (i.e. Aspercreme or generic equivalent)  -Recommend range of motion exercises multiple times a day.    --Follow up as needed if symptoms fail to improve or worsen.  Please call with questions or concerns.              Follow-ups after your visit        Who to contact     If you have questions or need follow up information about today's clinic visit or your schedule please contact [facility] directly at [phone].  Normal or non-critical lab and imaging results will be communicated to you by [employer]hart, letter or phone within 4 business days after the clinic has received the results. If you do not hear from us within 7 days, please contact the clinic through [employer] or phone. If you have a critical or abnormal lab result, we will notify you by phone as soon as possible.  Submit refill requests through [employer] or call your pharmacy and they will forward the refill request to us. Please allow 3 business days for your refill to be completed.          Additional Information About Your Visit        MyChart Information     [employer] gives you secure access to your electronic health record. If you see a primary care provider, you can also send messages to your care team  "and make appointments. If you have questions, please call your primary care clinic.  If you do not have a primary care provider, please call [phone] and they will assist you.        Care EveryWhere ID     This is your Care EveryWhere ID. This could be used by other organizations to access your [place] medical records  [id number]        Your Vitals Were     Height BMI (Body Mass Index)                5' 10\" (1.778 m) 30.42 kg/m2           Blood Pressure from Last 3 Encounters:   07/26/18 126/80   07/10/18 144/86   06/13/18 138/80    Weight from Last 3 Encounters:   07/26/18 212 lb (96.2 kg)   07/10/18 213 lb (96.6 kg)   06/13/18 217 lb (98.4 kg)              Today, you had the following     No orders found for display         Today's Medication Changes          These changes are accurate as of 7/26/18  8:56 AM.  If you have any questions, ask your nurse or doctor.               Start taking these medicines.        Dose/Directions    cyclobenzaprine 5 MG tablet   Commonly known as:  FLEXERIL   Used for:  Myalgia   Started by:  Felicitas Handy MD        Dose:  5 mg   Take 1 tablet (5 mg) by mouth 3 times daily as needed for muscle spasms   Quantity:  30 tablet   Refills:  1            Where to get your medicines      These medications were sent to [id number] - [place], [place] - [address] [address]    Hours:  [place] [place] - numbers unchanged   9/8/03  Phone:  [phone]     cyclobenzaprine 5 MG tablet                Primary Care Provider Office Phone # Fax #    Jarvis Bahena [phone]       [address] [facility] [address]        Equal Access to Services     [facility] AH: Chana Zapien, arnaldo hurtado, martha funesalalfonzo guevara. So [facility] [phone].    ATENCIÓN: Si habla español, tiene a jakcson disposición servicios gratuitos de asistencia lingüística. Llame al [phone].    We " comply with applicable federal civil rights laws and [place] laws. We do not discriminate on the basis of race, color, national origin, age, disability, sex, sexual orientation, or gender identity.            Thank you!     Thank you for choosing [facility]  for your care. Our goal is always to provide you with excellent care. Hearing back from our patients is one way we can continue to improve our services. Please take a few minutes to complete the written survey that you may receive in the mail after your visit with us. Thank you!             Your Updated Medication List - Protect others around you: Learn how to safely use, store and throw away your medicines at www.disposemymeds.org.          This list is accurate as of 7/26/18  8:56 AM.  Always use your most recent med list.                   Brand Name Dispense Instructions for use Diagnosis    albuterol 108 (90 Base) MCG/ACT Inhaler    PROAIR HFA/PROVENTIL HFA/VENTOLIN HFA    1 Inhaler    Inhale 1-2 puffs into the lungs every 4 hours as needed For wheezing    Acute bronchospasm       cyclobenzaprine 5 MG tablet    FLEXERIL    30 tablet    Take 1 tablet (5 mg) by mouth 3 times daily as needed for muscle spasms    Myalgia       lisinopril 40 MG tablet    PRINIVIL/ZESTRIL    90 tablet    Take 1 tablet (40 mg) by mouth daily    Benign essential hypertension       omeprazole 40 MG capsule    priLOSEC    30 capsule    Take 1 capsule (40 mg) by mouth daily Take 30-60 minutes before a meal.    Gastroesophageal reflux disease without esophagitis       predniSONE 20 MG tablet    DELTASONE    15 tablet    2 a day for 5 days then 1 a day for 5 days.    Cough
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
## 2019-04-28 ENCOUNTER — OFFICE VISIT (OUTPATIENT)
Dept: URGENT CARE | Facility: RETAIL CLINIC | Age: 59
End: 2019-04-28

## 2019-04-28 VITALS
OXYGEN SATURATION: 97 % | TEMPERATURE: 98.1 F | HEART RATE: 82 BPM | DIASTOLIC BLOOD PRESSURE: 80 MMHG | SYSTOLIC BLOOD PRESSURE: 123 MMHG

## 2019-04-28 DIAGNOSIS — J20.9 ACUTE BRONCHITIS WITH SYMPTOMS > 10 DAYS: Primary | ICD-10-CM

## 2019-04-28 DIAGNOSIS — R06.2 WHEEZING: ICD-10-CM

## 2019-04-28 PROCEDURE — 99213 OFFICE O/P EST LOW 20 MIN: CPT | Performed by: PHYSICIAN ASSISTANT

## 2019-04-28 RX ORDER — PREDNISONE 20 MG/1
40 TABLET ORAL DAILY
Qty: 10 TABLET | Refills: 0 | Status: SHIPPED | OUTPATIENT
Start: 2019-04-28 | End: 2019-05-03

## 2019-04-28 RX ORDER — ALBUTEROL SULFATE 90 UG/1
2 AEROSOL, METERED RESPIRATORY (INHALATION) EVERY 4 HOURS PRN
Qty: 18 G | Refills: 0 | Status: SHIPPED | OUTPATIENT
Start: 2019-04-28 | End: 2020-01-15

## 2019-04-28 NOTE — PROGRESS NOTES
"Chief Complaint   Patient presents with     Cough     3 weeks; not breathing normally per pt     Fatigue     tired     SUBJECTIVE:  Alexsander Sin is a 58 year old male who presents to the clinic with a chief complaint of cough for 3 weeks.  His cough is described as persistent and wheezing and worse at night and early in the morning.  The patient's symptoms are moderate and not improving.  Associated symptoms include fatigue, \"not breathing normally\" and some SOB on exertion.  The patient's symptoms are exacerbated by lying down, seems to be improved when he's up and moving around.  Patient has been using an albuterol inhaler to improve symptoms.  Predisposing factors include: lives with smokers, does not smoke himself.    Past Medical History:   Diagnosis Date     Hypertension      Current Outpatient Medications   Medication Sig Dispense Refill     albuterol (PROAIR HFA/PROVENTIL HFA/VENTOLIN HFA) 108 (90 Base) MCG/ACT inhaler Inhale 2 puffs into the lungs every 4 hours as needed for shortness of breath / dyspnea or wheezing 18 g 0     lisinopril (PRINIVIL/ZESTRIL) 40 MG tablet Take 1 tablet (40 mg) by mouth daily 90 tablet 3     omeprazole (PRILOSEC) 40 MG capsule TAKE ONE CAPSULE BY MOUTH ONCE DAILY 30 capsule 10     predniSONE (DELTASONE) 20 MG tablet Take 40 mg by mouth daily for 5 days. 10 tablet 0     Social History     Tobacco Use     Smoking status: Never Smoker     Smokeless tobacco: Never Used   Substance Use Topics     Alcohol use: Yes     Comment: rarely     Allergies   Allergen Reactions     Hydrocodone-Acetaminophen      Sulfa Drugs      REVIEW OF SYSTEMS  General: POSITIVE for fatigue. NEGATIVE for fever, chills.  ENT: POSITIVE for nasal congestion, sore thraot. NEGATIVE for sinus pressure.  Resp: POSITIVE for cough, wheezing and SOB at night. NEGATIVE for dyspnea on exertion.    OBJECTIVE:  /80 (BP Location: Left arm)   Pulse 82   Temp 98.1  F (36.7  C) (Oral)   SpO2 97%   GENERAL " APPEARANCE: healthy, alert and in no distress. Smells strongly of cigarette smoke.  HEENT: PERRL, conjunctiva clear. Bilateral ear canals and TM's normal. Nose without erythematous or edematous turbinates. Posterior pharynx nonerythematous and without tonsillar hypertrophy or exudate.  NECK: supple, nontender, no lymphadenopathy  RESP: lungs mostly clear to auscultation mild end expiratory wheeze in left lower lobe- no rales, rhonchi. Breathing is comfortable, not labored and without use of accessory muscles.  CV: regular rates and rhythm, normal S1 S2, no murmur noted    ASSESSMENT:    ICD-10-CM    1. Acute bronchitis with symptoms > 10 days J20.9 predniSONE (DELTASONE) 20 MG tablet     albuterol (PROAIR HFA/PROVENTIL HFA/VENTOLIN HFA) 108 (90 Base) MCG/ACT inhaler   2. Wheezing R06.2 predniSONE (DELTASONE) 20 MG tablet     albuterol (PROAIR HFA/PROVENTIL HFA/VENTOLIN HFA) 108 (90 Base) MCG/ACT inhaler     PLAN:   Patient Instructions   Prednisone 40mg daily for 5 days.  Albuterol 2 puffs every 4 hours as needed.    No indication for antibiotics discussed.   Rest! Your body needs more rest to heal.  Drink plenty of fluids (warm fluids like tea or soup are soothing and reduce cough)  Honey may soothe your sore throat and help manage your cough- may take straight or in warm water with lemon juice.  Take Tylenol or an NSAID such as ibuprofen or naproxen as needed for pain.  Delsym (dextromethorphan polistirex) is an over the counter cough medication that lasts 12 hours.   Follow up with your primary care provider if symptoms worsen or fail to improve as expected.      Follow up with primary care provider with any problems, questions or concerns or if symptoms worsen or fail to improve. Patient agreed to plan and verbalized understanding.    Talia Charles PA-C  [facility]

## 2019-04-28 NOTE — PATIENT INSTRUCTIONS
Prednisone 40mg daily for 5 days.  Albuterol 2 puffs every 4 hours as needed.    No indication for antibiotics discussed.   Rest! Your body needs more rest to heal.  Drink plenty of fluids (warm fluids like tea or soup are soothing and reduce cough)  Honey may soothe your sore throat and help manage your cough- may take straight or in warm water with lemon juice.  Take Tylenol or an NSAID such as ibuprofen or naproxen as needed for pain.  Delsym (dextromethorphan polistirex) is an over the counter cough medication that lasts 12 hours.   Follow up with your primary care provider if symptoms worsen or fail to improve as expected.

## 2020-01-15 ENCOUNTER — OFFICE VISIT (OUTPATIENT)
Dept: FAMILY MEDICINE | Facility: CLINIC | Age: 60
End: 2020-01-15
Payer: MEDICAID

## 2020-01-15 VITALS
TEMPERATURE: 97.8 F | HEART RATE: 94 BPM | SYSTOLIC BLOOD PRESSURE: 122 MMHG | DIASTOLIC BLOOD PRESSURE: 82 MMHG | WEIGHT: 214 LBS | BODY MASS INDEX: 30.64 KG/M2 | OXYGEN SATURATION: 96 % | HEIGHT: 70 IN | RESPIRATION RATE: 18 BRPM

## 2020-01-15 DIAGNOSIS — N50.89 ENLARGED TESTICLE: Primary | ICD-10-CM

## 2020-01-15 DIAGNOSIS — I10 BENIGN ESSENTIAL HYPERTENSION: ICD-10-CM

## 2020-01-15 DIAGNOSIS — K21.9 GASTROESOPHAGEAL REFLUX DISEASE WITHOUT ESOPHAGITIS: ICD-10-CM

## 2020-01-15 DIAGNOSIS — L97.919 SKIN ULCER OF LOWER LEG, RIGHT, WITH UNSPECIFIED SEVERITY (H): ICD-10-CM

## 2020-01-15 LAB
ANION GAP SERPL CALCULATED.3IONS-SCNC: 4 MMOL/L (ref 3–14)
BUN SERPL-MCNC: 16 MG/DL (ref 7–30)
CALCIUM SERPL-MCNC: 8.9 MG/DL (ref 8.5–10.1)
CHLORIDE SERPL-SCNC: 106 MMOL/L (ref 94–109)
CO2 SERPL-SCNC: 31 MMOL/L (ref 20–32)
CREAT SERPL-MCNC: 0.89 MG/DL (ref 0.66–1.25)
GFR SERPL CREATININE-BSD FRML MDRD: >90 ML/MIN/{1.73_M2}
GLUCOSE SERPL-MCNC: 101 MG/DL (ref 70–99)
POTASSIUM SERPL-SCNC: 4.1 MMOL/L (ref 3.4–5.3)
SODIUM SERPL-SCNC: 141 MMOL/L (ref 133–144)

## 2020-01-15 PROCEDURE — 80048 BASIC METABOLIC PNL TOTAL CA: CPT | Performed by: FAMILY MEDICINE

## 2020-01-15 PROCEDURE — 88305 TISSUE EXAM BY PATHOLOGIST: CPT | Mod: TC | Performed by: FAMILY MEDICINE

## 2020-01-15 PROCEDURE — 36415 COLL VENOUS BLD VENIPUNCTURE: CPT | Performed by: FAMILY MEDICINE

## 2020-01-15 PROCEDURE — 99214 OFFICE O/P EST MOD 30 MIN: CPT | Mod: 25 | Performed by: FAMILY MEDICINE

## 2020-01-15 PROCEDURE — 11402 EXC TR-EXT B9+MARG 1.1-2 CM: CPT | Performed by: FAMILY MEDICINE

## 2020-01-15 RX ORDER — LISINOPRIL 40 MG/1
40 TABLET ORAL DAILY
Qty: 90 TABLET | Refills: 3 | Status: SHIPPED | OUTPATIENT
Start: 2020-01-15 | End: 2021-01-11

## 2020-01-15 RX ORDER — OMEPRAZOLE 40 MG/1
CAPSULE, DELAYED RELEASE ORAL
Qty: 90 CAPSULE | Refills: 3 | Status: SHIPPED | OUTPATIENT
Start: 2020-01-15 | End: 2021-01-11

## 2020-01-15 ASSESSMENT — MIFFLIN-ST. JEOR: SCORE: 1795.36

## 2020-01-15 ASSESSMENT — PAIN SCALES - GENERAL: PAINLEVEL: NO PAIN (0)

## 2020-01-15 NOTE — PROGRESS NOTES
"Subjective     Sore on leg not healing and testicle pain     Alexsander is a 59 year old male who comes to clinic who has several complaints.  He has a sore in his leg that is been present for many years and seems to be slowly enlarging.  Weeps occasionally.  Also complains of testicular enlargement and a soreness that is been present for several years as well.  Slowly getting worse.  Is never had a looked at before.    Review of Systems   ROS COMP: Constitutional, HEENT, cardiovascular, pulmonary, gi and gu systems are negative, except as otherwise noted.      Objective    /82   Pulse 94   Temp 97.8  F (36.6  C) (Temporal)   Resp 18   Ht 1.775 m (5' 9.9\")   Wt 97.1 kg (214 lb)   SpO2 96%   BMI 30.79 kg/m       Wt Readings from Last 2 Encounters:   01/22/20 97.1 kg (214 lb)   01/15/20 97.1 kg (214 lb)       Physical Exam   Well-appearing male no distress.  Heart regular.  Lungs clear and unlabored.  On his right calf in the posterior aspect there is a 1.5 cm x 1.5 cm round area of erythema with scale and superficial ulceration.  He is external genitalia appears normal.  Penis normal.  His testes bilaterally are not enlarged.  However the left side epididymis has a large cystic feeling structure.      Procedure-after informed consent I use a small amount of lidocaine with epinephrine to anesthetize a small area of the calf lesion.  I then performed a 6 mm punch biopsy.  Without difficulty.  Minimal EBL.  Bandage applied.  Tolerated well.        Assessment & Plan       ICD-10-CM    1. Enlarged testicle N50.89 US Testicular & Scrotum w Doppler Ltd   2. Benign essential hypertension I10 lisinopril (PRINIVIL/ZESTRIL) 40 MG tablet     Basic metabolic panel   3. Gastroesophageal reflux disease without esophagitis K21.9 omeprazole (PRILOSEC) 40 MG DR capsule   4. Skin ulcer of lower leg, right, with unspecified severity (H) L97.919 Dermatological path order and indications       Set him up for ultrasound to look at " the epididymis on the left that is enlarged.  His blood pressure is at goal and refill provided.  He is due for routine lab check and I will get back to with results  Refill provided for stable reflux disease, but warned about some long-term complications with PPIs, and asked to potentially switch to an H2 blocker and use conservative measures to manage his reflux  Punch biopsy today of a suspicious lesion on his right posterior calf, suspect squamous cell carcinoma    If cancerous would send to dermatology.  Routine med check in 3 to 4 months, sooner as needed  Carlos Hurt MD  [facility]

## 2020-01-17 ENCOUNTER — HOSPITAL ENCOUNTER (OUTPATIENT)
Dept: ULTRASOUND IMAGING | Facility: CLINIC | Age: 60
Discharge: HOME | End: 2020-01-17
Attending: FAMILY MEDICINE | Admitting: FAMILY MEDICINE
Payer: MEDICAID

## 2020-01-17 DIAGNOSIS — N50.89 ENLARGED TESTICLE: ICD-10-CM

## 2020-01-17 LAB — COPATH REPORT: NORMAL

## 2020-01-17 PROCEDURE — 76870 US EXAM SCROTUM: CPT

## 2020-01-20 ENCOUNTER — TELEPHONE (OUTPATIENT)
Dept: FAMILY MEDICINE | Facility: CLINIC | Age: 60
End: 2020-01-20

## 2020-01-20 DIAGNOSIS — Z12.11 ENCOUNTER FOR SCREENING COLONOSCOPY: Primary | ICD-10-CM

## 2020-01-21 ENCOUNTER — TELEPHONE (OUTPATIENT)
Dept: INTERNAL MEDICINE | Facility: CLINIC | Age: 60
End: 2020-01-21

## 2020-01-21 ENCOUNTER — TELEPHONE (OUTPATIENT)
Dept: FAMILY MEDICINE | Facility: CLINIC | Age: 60
End: 2020-01-21

## 2020-01-21 DIAGNOSIS — C44.320 SCC (SQUAMOUS CELL CARCINOMA), FACE: Primary | ICD-10-CM

## 2020-01-21 DIAGNOSIS — C44.722 SQUAMOUS CELL CARCINOMA OF SKIN OF RIGHT LOWER EXTREMITY: ICD-10-CM

## 2020-01-21 NOTE — TELEPHONE ENCOUNTER
Please add dx to patients referral.    And sign also is patient aware of this referral?    Thank you   Mae KIMBROUGH

## 2020-01-21 NOTE — TELEPHONE ENCOUNTER
Left a message for patient to return my call to schedule a colonoscopy/endoscopy. If Mindy or Kayley are not available, please transfer to Same Day Surgery at [phone]

## 2020-01-21 NOTE — TELEPHONE ENCOUNTER
----- Message from Carlos Hurt MD sent at 1/21/2020  1:23 PM CST -----  Please set up with dermatology, Dr. Lopez in [place], thanks
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
## 2020-01-22 ENCOUNTER — OFFICE VISIT (OUTPATIENT)
Dept: FAMILY MEDICINE | Facility: OTHER | Age: 60
End: 2020-01-22
Payer: MEDICAID

## 2020-01-22 ENCOUNTER — MEDICAL CORRESPONDENCE (OUTPATIENT)
Dept: HEALTH INFORMATION MANAGEMENT | Facility: CLINIC | Age: 60
End: 2020-01-22

## 2020-01-22 ENCOUNTER — TELEPHONE (OUTPATIENT)
Dept: DERMATOLOGY | Facility: CLINIC | Age: 60
End: 2020-01-22

## 2020-01-22 VITALS
BODY MASS INDEX: 30.64 KG/M2 | WEIGHT: 214 LBS | OXYGEN SATURATION: 100 % | SYSTOLIC BLOOD PRESSURE: 132 MMHG | TEMPERATURE: 98 F | HEART RATE: 82 BPM | RESPIRATION RATE: 18 BRPM | DIASTOLIC BLOOD PRESSURE: 84 MMHG | HEIGHT: 70 IN

## 2020-01-22 DIAGNOSIS — Z02.6 ENCOUNTER RELATED TO WORKER'S COMPENSATION CLAIM: ICD-10-CM

## 2020-01-22 DIAGNOSIS — M77.11 RIGHT LATERAL EPICONDYLITIS: Primary | ICD-10-CM

## 2020-01-22 DIAGNOSIS — M25.521 RIGHT ELBOW PAIN: ICD-10-CM

## 2020-01-22 PROCEDURE — 99214 OFFICE O/P EST MOD 30 MIN: CPT | Performed by: PHYSICIAN ASSISTANT

## 2020-01-22 ASSESSMENT — PAIN SCALES - GENERAL: PAINLEVEL: MILD PAIN (2)

## 2020-01-22 ASSESSMENT — MIFFLIN-ST. JEOR: SCORE: 1795.36

## 2020-01-22 NOTE — TELEPHONE ENCOUNTER
Reason for call:  Other   Patient called regarding (reason for call): call back  Additional comments: Patient is trying to make an appointment before 1/31/20 as that is hen his insurance will run out.     Phone number to reach patient:  Cell number on file:    Telephone Information:   Mobile [phone]       Best Time:  Anytime    Can we leave a detailed message on this number?  YES

## 2020-01-22 NOTE — TELEPHONE ENCOUNTER
Patient called back and said that he would like to call when he is ready to schedule. Patient requested that the prep packet be mailed to him so he has it when scheduling. I will put the prep in the mail today.

## 2020-01-22 NOTE — PATIENT INSTRUCTIONS
- Brace (tennis elbow brace) - wear during the day    - Ice - 15 minute intervals when flaring     - Naproxen (aleve) as needed     - Stretches - 3-4x/day     - Call to get set up with physical therapy     - If not improving in 3-4 weeks, call and will refer to orthopedics       Patient Education     Understanding Lateral Epicondylitis    Tendons are strong bands of tissue that connect muscles to bones. Lateral epicondylitis affects the tendons that connect muscles in the forearm to the lateral epicondyle. This is the bony knob on the outer side of the elbow. The condition occurs if the extensor tendons of the wrist become red and swollen (irritated). This can cause pain in the elbow, forearm, and wrist. Because the condition is sometimes caused by playing tennis, it is also known as  tennis elbow.   How to say it  LA-tuhr-delon [id number]-LY-tis   What causes lateral epicondylitis?  The condition most often occurs because of overuse. This can be from any activity that repeatedly puts stress on the forearm extensor muscles or tendons and wrist. For instance, playing tennis, lifting weights, cutting meat, painting, and typing can all cause the condition. Wear and tear of the tendons from aging or an injury to the tendons can also cause the condition.  Symptoms of lateral epicondylitis  The most common symptom is pain. You may feel it on the outer side of the elbow and down the back of the forearm. It may be worse when moving or using the elbow, forearm, or wrist. You may also feel pain when gripping or lifting things.  Treatment for lateral epicondylitis  Treatments may include:    Resting the elbow, forearm, and wrist. You ll need to avoid movements that can make your symptoms worse. You also may need to avoid certain sports and types of work for a time. This helps relieve symptoms and prevent further damage to the tendons.    Changing the action that caused the problem. For instance, if the tendons were damaged  from playing tennis, it may help to change your playing technique or use different equipment. This helps prevent further damage to the tendons.    Using cold packs. Putting an ice pack on the injured area can help reduce pain and swelling.    Taking pain medicines. Taking prescription or over-the-counter pain medicines may help reduce pain and swelling.      Wearing a brace. This helps reduce strain on the muscles and tendons in the forearm, which may relieve symptoms. It is very important to wear the brace properly.    Doing exercises and physical therapy. These help improve strength and range of motion in the elbow, forearm, and wrist.    Getting shots of medicine into the injured area. These may help relieve symptoms for a time.    Having surgery. This may be an option if other treatments fail to relieve symptoms. In many cases, the surgeon removes the damaged tissue.  Possible complications of lateral epicondylitis  If the tendons involved don t heal properly, symptoms may return or get worse. To help prevent this, follow your treatment plan as directed.  When to call your healthcare provider  Call your healthcare provider right away if you have any of these:    Fever of 100.4 F (38 C) or higher, or as directed    Redness, swelling, or warmth in the elbow or forearm that gets worse    Symptoms that don t get better with treatment, or get worse    New symptoms   Date Last Reviewed: 3/10/2016    5581-6712 [employer]. [address] [place] [address], [place] [address]. All rights reserved. This information is not intended as a substitute for professional medical care. Always follow your healthcare professional's instructions.           Patient Education     Treating Tennis Elbow    Your treatment will depend on how inflamed your tendon is. The goal is to relieve your symptoms and help you regain full use of your elbow.  Rest and medicine  Wearing a tennis elbow splint allows the inflamed tendon to rest. It  must be worn properly. It should be placed down the arm past the painful area of the elbow. If it is directly over the inflamed tendon, it can worsen the symptoms. This brace can help the tendon heal. Using your other hand or changing your  also takes stress off the tendon. Oral nonsteroidal anti-inflammatory medicines (NSAIDs) and ice can relieve pain and reduce swelling.  Exercises and therapy  Your healthcare provider may give you an exercise program. He or she may refer you to a physical therapist. The physical therapist will teach you how to gently stretch and strengthen the muscles around your elbow.  Anti-inflammatory injections  Your healthcare provider may give you injections of an anti-inflammatory medicine, such as cortisone. This helps reduce swelling. You may have more pain at first. But in a few days, your elbow should feel better.  If surgery is needed  If your symptoms persist for a long time, or other treatments don t work, your healthcare provider may recommend surgery. Surgery repairs the inflamed tendon.  Date Last Reviewed: 1/1/2018 2000-2019 [employer]. [address] [place] [address]. All rights reserved. This information is not intended as a substitute for professional medical care. Always follow your healthcare professional's instructions.           Patient Education     Wrist Flexion (Flexibility)    1. Stand or sit and hold your arms straight out in front of you.  2. Dangle your right hand at the wrist. Gently press down on your right hand with your left hand. Feel the stretch in your right wrist and the top of your hand.  3. Hold for 30 to 60 seconds, then relax.  4. Switch arms and repeat 2 times.   Date Last Reviewed: 3/10/2016    6007-9505 [employer]. [address] [place] [address]. All rights reserved. This information is not intended as a substitute for professional medical care. Always follow your healthcare professional's  instructions.           Patient Education     Wrist Flexion (Strength)     This exercise is written for your right elbow. Switch sides for your left elbow.  1. Sit in a chair next to a table. Rest your right forearm on the table. Hang your right wrist off the edge of the table, palm up. Hold a hand weight in your right hand. Your healthcare provider will tell you what size of hand weight to use.  2. Keep your forearm in place and bend your wrist upward to lift the weight. Hold for 5 seconds.  3. Slowly lower the hand weight back down.  4. Repeat 5 times, or as instructed.  Date Last Reviewed: 3/10/2016    8410-1620 [employer]. [address] [place] [address]. All rights reserved. This information is not intended as a substitute for professional medical care. Always follow your healthcare professional's instructions.           Patient Education     Wrist Extension (Strength)     This exercise is written for your right elbow. Switch sides for your left elbow.  1. Sit in a chair. Hold a hand weight in your right hand. Your healthcare provider will tell you what size of hand weight to use.  2. Lean your forearm on your thigh or a table. Hang your wrist off the end of your knee or edge of the table, palm down.  3. Keep your forearm in place and bend your wrist upward. Lift the hand weight as high as you can.  4. Slowly lower the hand weight back down.  5. Repeat 5 times, or as instructed.  Date Last Reviewed: 3/10/2016    6203-0601 [employer]. [address] [place] [address]. All rights reserved. This information is not intended as a substitute for professional medical care. Always follow your healthcare professional's instructions.           Patient Education     Wrist Pronation (Flexibility)    1. Sit with your right arm against your body and elbow bent. Support your right elbow with your left hand.  2. Hold your hand straight ahead, thumb up. Turn your hand to the left  so your palm is down. Hold for 5 seconds. Then turn your hand back to thumb up and relax.  3. Repeat 10 times, or as instructed.  Date Last Reviewed: 3/10/2016    1322-0250 [employer]. [address] [place] [address]. All rights reserved. This information is not intended as a substitute for professional medical care. Always follow your healthcare professional's instructions.           Patient Education     Wrist Supination (Flexibility)    This exercise is for your right hand and wrist. Switch sides for your left hand and wrist.  1. Sit with your right arm against your body and elbow bent. Support your right elbow with your left hand.  2. Hold your hand straight ahead, thumb up. Turn your hand to the right so your palm is up. Hold for 5 seconds. Then turn your hand back to thumb up and relax.  3. Repeat 10 times, or as instructed.  Date Last Reviewed: 3/10/2016    8607-7680 [employer]. [address] [place] [address]. All rights reserved. This information is not intended as a substitute for professional medical care. Always follow your healthcare professional's instructions.           Patient Education     Understanding Lateral Epicondylitis    Tendons are strong bands of tissue that connect muscles to bones. Lateral epicondylitis affects the tendons that connect muscles in the forearm to the lateral epicondyle. This is the bony knob on the outer side of the elbow. The condition occurs if the extensor tendons of the wrist become red and swollen (irritated). This can cause pain in the elbow, forearm, and wrist. Because the condition is sometimes caused by playing tennis, it is also known as  tennis elbow.   How to say it  LA-tuhr-delon [id number]-[id number]-LY-tis   What causes lateral epicondylitis?  The condition most often occurs because of overuse. This can be from any activity that repeatedly puts stress on the forearm extensor muscles or tendons and wrist. For instance,  playing tennis, lifting weights, cutting meat, painting, and typing can all cause the condition. Wear and tear of the tendons from aging or an injury to the tendons can also cause the condition.  Symptoms of lateral epicondylitis  The most common symptom is pain. You may feel it on the outer side of the elbow and down the back of the forearm. It may be worse when moving or using the elbow, forearm, or wrist. You may also feel pain when gripping or lifting things.  Treatment for lateral epicondylitis  Treatments may include:    Resting the elbow, forearm, and wrist. You ll need to avoid movements that can make your symptoms worse. You also may need to avoid certain sports and types of work for a time. This helps relieve symptoms and prevent further damage to the tendons.    Changing the action that caused the problem. For instance, if the tendons were damaged from playing tennis, it may help to change your playing technique or use different equipment. This helps prevent further damage to the tendons.    Using cold packs. Putting an ice pack on the injured area can help reduce pain and swelling.    Taking pain medicines. Taking prescription or over-the-counter pain medicines may help reduce pain and swelling.      Wearing a brace. This helps reduce strain on the muscles and tendons in the forearm, which may relieve symptoms. It is very important to wear the brace properly.    Doing exercises and physical therapy. These help improve strength and range of motion in the elbow, forearm, and wrist.    Getting shots of medicine into the injured area. These may help relieve symptoms for a time.    Having surgery. This may be an option if other treatments fail to relieve symptoms. In many cases, the surgeon removes the damaged tissue.  Possible complications of lateral epicondylitis  If the tendons involved don t heal properly, symptoms may return or get worse. To help prevent this, follow your treatment plan as  directed.  When to call your healthcare provider  Call your healthcare provider right away if you have any of these:    Fever of 100.4 F (38 C) or higher, or as directed    Redness, swelling, or warmth in the elbow or forearm that gets worse    Symptoms that don t get better with treatment, or get worse    New symptoms   Date Last Reviewed: 3/10/2016    8654-4391 [employer]. [address] [place] [address]. All rights reserved. This information is not intended as a substitute for professional medical care. Always follow your healthcare professional's instructions.           Patient Education     Wrist Supination (Strength)  These instructions are for your right elbow. Switch sides for your left elbow.   1. Sit in a chair next to a table. Rest your right forearm on the table. Hang your right wrist off the edge of the table, palm down. Hold a hand weight in your right hand. Your healthcare provider will tell you what size of hand weight to use.  2. Keep your forearm in place and turn your wrist to the right until your thumb is on top.  3. Slowly lower the hand weight back down to the left.  4. Repeat 10 times, or as instructed.    Date Last Reviewed: 3/10/2016    1613-8552 [employer]. [address] [place] [address]. All rights reserved. This information is not intended as a substitute for professional medical care. Always follow your healthcare professional's instructions.

## 2020-01-22 NOTE — TELEPHONE ENCOUNTER
Spoke to patient who needs Mohs surgery for SCC on R lower leg which was biopsied on 1-15-20.      Please advise. Ok to add on next week at 8:30 am? Patient is only insured until 1-31-20, then has a gap in insurance.     Sherly Mcallister RN

## 2020-01-22 NOTE — TELEPHONE ENCOUNTER
Patient notified. Patient verbalized understanding. Scheduled for MOHS Surgery. Mohs brochure/Pre-op letter sent via my chart and US mail.   Sherly Mcallister RN

## 2020-01-22 NOTE — LETTER
[facility]  [address]  [facility] [address]  Phone: [phone]    January 22, 2020    Alexsander Sin                                                                                                                     [id number] [address] [employer] [address]            Dear Mr. Sin,    You are scheduled for Mohs Surgery on:     Wednesday January 29 th at 8:30 am.      Please check in at Dermatology Clinic.   (2nd Floor, last  Clinic on right up staircase or elevator -past OB/GYN clinic)    You don't need to arrive more than 5-10 minutes prior to your appointment time.     Be sure to eat a good breakfast and bathe and wash your hair prior to Surgery.    If you are taking any anti-coagulants that are prescribed by your Doctor (such as Coumadin/warfarin, Plavix, Aspirin, Ibuprofen), please continue taking them.     However, If you are taking anti-coagulants over the counter without  a Doctor's order for a Medical condition, please discontinue them 10 days prior to Surgery.      Please wear loose comfortable clothing as it could possibly be 4-6 hours until your surgery is completed depending upon how many layers of tissue need to be removed.     Wi-fi access is available.     Thank you,      Bobo Lopez MD/ Sherly Mcallister RN

## 2020-01-26 ENCOUNTER — NURSE TRIAGE (OUTPATIENT)
Dept: NURSING | Facility: CLINIC | Age: 60
End: 2020-01-26

## 2020-01-27 NOTE — TELEPHONE ENCOUNTER
Calling about a friend but declined to give that person's name. Declined triage - just want to know if mental health services available at [facility] ED. Advised all EDs can evaluate mental health sx but more specialized BH teams and inpatient programs exist at [facility] and [facility].    Reason for Disposition    General information question, no triage required and triager able to answer question    Additional Information    Negative: [1] Caller is not with the adult (patient) AND [2] reporting urgent symptoms    Negative: Lab result questions    Negative: Medication questions    Negative: Caller can't be reached by phone    Negative: Caller has already spoken to PCP or another triager    Negative: RN needs further essential information from caller in order to complete triage    Negative: Requesting regular office appointment    Negative: [1] Caller requesting NON-URGENT health information AND [2] PCP's office is the best resource    Negative: Health Information question, no triage required and triager able to answer question    Protocols used: INFORMATION ONLY CALL-A-

## 2020-01-29 ENCOUNTER — OFFICE VISIT (OUTPATIENT)
Dept: DERMATOLOGY | Facility: CLINIC | Age: 60
End: 2020-01-29
Payer: MEDICAID

## 2020-01-29 VITALS — DIASTOLIC BLOOD PRESSURE: 73 MMHG | OXYGEN SATURATION: 98 % | HEART RATE: 89 BPM | SYSTOLIC BLOOD PRESSURE: 122 MMHG

## 2020-01-29 DIAGNOSIS — D18.01 ANGIOMA OF SKIN: ICD-10-CM

## 2020-01-29 DIAGNOSIS — L82.1 SEBORRHEIC KERATOSIS: ICD-10-CM

## 2020-01-29 DIAGNOSIS — L81.4 LENTIGO: ICD-10-CM

## 2020-01-29 DIAGNOSIS — D48.5 NEOPLASM OF UNCERTAIN BEHAVIOR OF SKIN: ICD-10-CM

## 2020-01-29 DIAGNOSIS — C44.722 SQUAMOUS CELL CARCINOMA OF RIGHT LOWER LEG: Primary | ICD-10-CM

## 2020-01-29 DIAGNOSIS — D22.9 NEVUS: ICD-10-CM

## 2020-01-29 PROCEDURE — 88342 IMHCHEM/IMCYTCHM 1ST ANTB: CPT | Mod: TC | Performed by: DERMATOLOGY

## 2020-01-29 PROCEDURE — 88305 TISSUE EXAM BY PATHOLOGIST: CPT | Mod: TC | Performed by: DERMATOLOGY

## 2020-01-29 PROCEDURE — 99244 OFF/OP CNSLTJ NEW/EST MOD 40: CPT | Mod: 25 | Performed by: DERMATOLOGY

## 2020-01-29 PROCEDURE — 11102 TANGNTL BX SKIN SINGLE LES: CPT | Mod: 59 | Performed by: DERMATOLOGY

## 2020-01-29 PROCEDURE — 88341 IMHCHEM/IMCYTCHM EA ADD ANTB: CPT | Mod: TC | Performed by: DERMATOLOGY

## 2020-01-29 PROCEDURE — 17313 MOHS 1 STAGE T/A/L: CPT | Performed by: DERMATOLOGY

## 2020-01-29 NOTE — LETTER
1/29/2020         RE: Alexsander Sin  [address]  [facility] [address]        Dear Colleague,    Thank you for referring your patient, Alexsander Sin, to the [facility]. Please see a copy of my visit note below.    Surgical Office Location :   [facility]  [address] [place] [address]      Alexsander Sin , a 59 year old year old male patient, I was asked to see by Dr. Hurt for squamous cell carcinoma on right calf.     Patient states this has been present for a while.  Patient reports the following symptoms:  growing .  Patient reports the following previous treatments none.  Patient reports the following modifying factors none.  Associated symptoms: none.  Patient has no other skin complaints today.  Remainder of the HPI, Meds, PMH, Allergies, FH, and SH was reviewed in chart.      Past Medical History:   Diagnosis Date     Hypertension      Squamous cell carcinoma of skin, unspecified        Past Surgical History:   Procedure Laterality Date     HERNIA REPAIR       ORTHOPEDIC SURGERY          Family History   Problem Relation Age of Onset     Hypertension Mother      Lung Cancer Mother      Diabetes Father         prediabetes     Esophageal Cancer Father      Dementia Maternal Grandmother         in 90's     Myocardial Infarction Maternal Grandfather         before 60     Breast Cancer Paternal Grandmother      Stomach Cancer Paternal Grandfather        Social History     Socioeconomic History     Marital status:      Spouse name: Not on file     Number of children: Not on file     Years of education: Not on file     Highest education level: Not on file   Occupational History     Not on file   Social Needs     Financial resource strain: Not on file     Food insecurity:     Worry: Not on file     Inability: Not on file     Transportation needs:     Medical: Not on file     Non-medical: Not on file   Tobacco Use     Smoking status: Never Smoker      Smokeless tobacco: Never Used   Substance and Sexual Activity     Alcohol use: Not Currently     Comment: very rare     Drug use: No     Sexual activity: Yes     Partners: Female   Lifestyle     Physical activity:     Days per week: Not on file     Minutes per session: Not on file     Stress: Not on file   Relationships     Social connections:     Talks on phone: Not on file     Gets together: Not on file     Attends Gnosticist service: Not on file     Active member of club or organization: Not on file     Attends meetings of clubs or organizations: Not on file     Relationship status: Not on file     Intimate partner violence:     Fear of current or ex partner: Not on file     Emotionally abused: Not on file     Physically abused: Not on file     Forced sexual activity: Not on file   Other Topics Concern     Parent/sibling w/ CABG, MI or angioplasty before 65F 55M? No   Social History Narrative     Not on file       Outpatient Encounter Medications as of 1/29/2020   Medication Sig Dispense Refill     lisinopril (PRINIVIL/ZESTRIL) 40 MG tablet Take 1 tablet (40 mg) by mouth daily 90 tablet 3     omeprazole (PRILOSEC) 40 MG DR capsule TAKE ONE CAPSULE BY MOUTH ONCE DAILY 90 capsule 3     No facility-administered encounter medications on file as of 1/29/2020.              Review Of Systems  Skin: As above  Eyes: negative  Ears/Nose/Throat: negative  Respiratory: No shortness of breath, dyspnea on exertion, cough, or hemoptysis  Cardiovascular: negative  Gastrointestinal: negative  Genitourinary: negative  Musculoskeletal: negative  Neurologic: negative  Psychiatric: negative  Hematologic/Lymphatic/Immunologic: negative  Endocrine: negative      O:   NAD, WDWN, Alert & Oriented, Mood & Affect wnl, Vitals stable   Here today with son   /73   Pulse 89   SpO2 98%    General appearance leonel ii   Vitals stable   Alert, oriented and in no acute distress      Following lymph nodes palpated: Occipital, Cervical,  Supraclavicular , popliteal no lad     R mid back irregular pigmented 8mm patch    Pigmented macule son trunk and ext with regular broders and pigment networks   R calf 1.8cm red plaque      Stuck on papules and brown macules on trunk and ext    Red papules on trunk   Flesh colored papules on trunk      The remainder of the full exam was unremarkable; the following areas were examined:  conjunctiva/lids, oral mucosa, neck, peripheral vascular system, abdomen, lymph nodes, digits/nails, eccrine and apocrine glands, scalp/hair, face, neck, chest, abdomen, buttocks, back, RUE, LUE, RLE, LLE       Eyes: Conjunctivae/lids:Normal     ENT: Lips, buccal mucosa, tongue: normal    MSK:Normal    Cardiovascular: peripheral edema none    Pulm: Breathing Normal    Lymph Nodes: No Head and Neck Lymphadenopathy     Neuro/Psych: Orientation:Normal; Mood/Affect:Normal      A/P:  1. R mid back r/o melanoma  TANGENTIAL BIOPSY SENT OUT:  After consent, anesthesia with LEC and prep, tangential excision performed and specimen sent out for permanent section histology.  No complications and routine wound care. Patient told to call our office in 1-2 weeks for result.       2. Nevi, seborrheic keratosis, letnigo, angioma, dermal nevus  3. R calf squamous cell carcinoma   BENIGN LESIONS DISCUSSED WITH PATIENT:  I discussed the specifics of tumor, prognosis, and genetics of benign lesions.  I explained that treatment of these lesions would be purely cosmetic and not medically neccessary.  I discussed with patient different removal options including excision, cautery and /or laser.      Nature and genetics of benign skin lesions dicussed with patient.  Signs and Symptoms of skin cancer discussed with patient.  ABCDEs of melanoma reviewed with patient.  Patient encouraged to perform monthly skin exams.  UV precautions reviewed with patient.  Patient to follow up with Primary Care provider regarding elevated blood pressure.    Skin care regimen  reviewed with patient: Eliminate harsh soaps, i.e. Dial, zest, irsih spring; Mild soaps such as Cetaphil or Dove sensitive skin, avoid hot or cold showers, aggressive use of emollients including vanicream, cetaphil or cerave discussed with patient.    Risks of non-melanoma skin cancer discussed with patient   Return to clinic 6 months  PROCEDURE NOTE  R calf squamous cell carcinoma   MOHS:   Size    After PGACAC discussed with patient, decision for Mohs surgery was made. Indication for Mohs was Size. Patient confirmed the site with Dr. Lopez.  After anesthesia with LEC, the tumor was excised using standard Mohs technique in 1 stages(s).  CLEAR MARGINS OBTAINED and Final defect size was 2.7 x 2.3 cm.       REPAIR SECOND INTENT: We discussed the options for wound management in full with the patient including risks/benefits/possible outcomes. Decision made to allow the wound to heal by second intention. EBL minimal; complications none; wound care routine.  The patient was discharged in good condition and will return in one month or prn for wound evaluation.      Again, thank you for allowing me to participate in the care of your patient.        Sincerely,        Bobo Lopez MD

## 2020-01-29 NOTE — PROGRESS NOTES
Surgical Office Location :   [facility]  [address] [place] [address]

## 2020-01-29 NOTE — PATIENT INSTRUCTIONS
Open Wound Care     for ___leg___________        ? No strenuous activity for 48 hours    ? Take Tylenol as needed for discomfort.                                                .         ? Do not drink alcoholic beverages for 48 hours.    ? Keep the pressure bandage in place for 24 hours. If the bandage becomes blood tinged or loose, reinforce it with gauze and tape.        (Refer to the reverse side of this page for management of bleeding).    ? Remove bandage in 24 hours and begin wound care as follows:     1. Clean area with tap water using a Q tip or gauze pad, (shower / bathe normally)  2. Dry wound with Q tip or gauze pad  3. Apply Aquaphor, Vaseline, Polysporin or Bacitracin Ointment with a Q tip    Do NOT use Neosporin Ointment *  4. Cover the wound with a band-aid or nonstick gauze pad and paper tape.  5. Repeat wound care once a day until wound is completely healed.    It is an old wives tale that a wound heals better when it is exposed to air and allowed to dry out. The wound will heal faster with a better cosmetic result if it is kept moist with ointment and covered with a bandage.  Do not let the wound dry out.      Supplies Needed:                Qtips or gauze pads                Polysporin or Bacitracin Ointment                Bandaids or nonstick gauze pads and paper tape    Wound care kits and brown paper tape are available for purchase at   the pharmacy.    BLEEDIN. Use tightly rolled up gauze or cloth to apply direct pressure over the bandage for 20   minutes.  2. Reapply pressure for an additional 20 minutes if necessary  3. Call the office or go to the nearest emergency room if pressure fails to stop the bleeding.  4. Use additional gauze and tape to maintain pressure once the bleeding has stopped.  5. Begin wound care 24 hours after surgery as directed.                  WOUND HEALING    1. One week after surgery a pink / red halo will form around the outside of the wound.   This is new  skin.  2. The center of the wound will appear yellowish white and produce some drainage.  3. The pink halo will slowly migrate in toward the center of the wound until the wound is covered with new shiny pink skin.  4. There will be no more drainage when the wound is completely healed.  5. It will take six months to one year for the redness to fade.  6. The scar may be itchy, tight and sensitive to extreme temperatures for a year after the surgery.  7. Massaging the area several times a day for several minutes after the wound is completely healed will help the scar soften and normalize faster. Begin massage only after healing is complete.      In case of emergency call: Dr Lopez: [phone]     [facility]: [phone]    [facility]: [phone]

## 2020-01-29 NOTE — PROGRESS NOTES
Alexsander Sin , a 59 year old year old male patient, I was asked to see by Dr. Hurt for squamous cell carcinoma on right calf.     Patient states this has been present for a while.  Patient reports the following symptoms:  growing .  Patient reports the following previous treatments none.  Patient reports the following modifying factors none.  Associated symptoms: none.  Patient has no other skin complaints today.  Remainder of the HPI, Meds, PMH, Allergies, FH, and SH was reviewed in chart.      Past Medical History:   Diagnosis Date     Hypertension      Squamous cell carcinoma of skin, unspecified        Past Surgical History:   Procedure Laterality Date     HERNIA REPAIR       ORTHOPEDIC SURGERY          Family History   Problem Relation Age of Onset     Hypertension Mother      Lung Cancer Mother      Diabetes Father         prediabetes     Esophageal Cancer Father      Dementia Maternal Grandmother         in 90's     Myocardial Infarction Maternal Grandfather         before 60     Breast Cancer Paternal Grandmother      Stomach Cancer Paternal Grandfather        Social History     Socioeconomic History     Marital status:      Spouse name: Not on file     Number of children: Not on file     Years of education: Not on file     Highest education level: Not on file   Occupational History     Not on file   Social Needs     Financial resource strain: Not on file     Food insecurity:     Worry: Not on file     Inability: Not on file     Transportation needs:     Medical: Not on file     Non-medical: Not on file   Tobacco Use     Smoking status: Never Smoker     Smokeless tobacco: Never Used   Substance and Sexual Activity     Alcohol use: Not Currently     Comment: very rare     Drug use: No     Sexual activity: Yes     Partners: Female   Lifestyle     Physical activity:     Days per week: Not on file     Minutes per session: Not on file     Stress: Not on file   Relationships     Social connections:      Talks on phone: Not on file     Gets together: Not on file     Attends [employer] service: Not on file     Active member of club or organization: Not on file     Attends meetings of clubs or organizations: Not on file     Relationship status: Not on file     Intimate partner violence:     Fear of current or ex partner: Not on file     Emotionally abused: Not on file     Physically abused: Not on file     Forced sexual activity: Not on file   Other Topics Concern     Parent/sibling w/ CABG, MI or angioplasty before 65F 55M? No   Social History Narrative     Not on file       Outpatient Encounter Medications as of 1/29/2020   Medication Sig Dispense Refill     lisinopril (PRINIVIL/ZESTRIL) 40 MG tablet Take 1 tablet (40 mg) by mouth daily 90 tablet 3     omeprazole (PRILOSEC) 40 MG DR capsule TAKE ONE CAPSULE BY MOUTH ONCE DAILY 90 capsule 3     No facility-administered encounter medications on file as of 1/29/2020.              Review Of Systems  Skin: As above  Eyes: negative  Ears/Nose/Throat: negative  Respiratory: No shortness of breath, dyspnea on exertion, cough, or hemoptysis  Cardiovascular: negative  Gastrointestinal: negative  Genitourinary: negative  Musculoskeletal: negative  Neurologic: negative  Psychiatric: negative  Hematologic/Lymphatic/Immunologic: negative  Endocrine: negative      O:   NAD, WDWN, Alert & Oriented, Mood & Affect wnl, Vitals stable   Here today with son   /73   Pulse 89   SpO2 98%    General appearance leonel ii   Vitals stable   Alert, oriented and in no acute distress      Following lymph nodes palpated: Occipital, Cervical, Supraclavicular , popliteal no lad     R mid back irregular pigmented 8mm patch    Pigmented macule son trunk and ext with regular broders and pigment networks   R calf 1.8cm red plaque      Stuck on papules and brown macules on trunk and ext    Red papules on trunk   Flesh colored papules on trunk      The remainder of the full exam was unremarkable;  the following areas were examined:  conjunctiva/lids, oral mucosa, neck, peripheral vascular system, abdomen, lymph nodes, digits/nails, eccrine and apocrine glands, scalp/hair, face, neck, chest, abdomen, buttocks, back, RUE, LUE, RLE, LLE       Eyes: Conjunctivae/lids:Normal     ENT: Lips, buccal mucosa, tongue: normal    MSK:Normal    Cardiovascular: peripheral edema none    Pulm: Breathing Normal    Lymph Nodes: No Head and Neck Lymphadenopathy     Neuro/Psych: Orientation:Normal; Mood/Affect:Normal      A/P:  1. R mid back r/o melanoma  TANGENTIAL BIOPSY SENT OUT:  After consent, anesthesia with LEC and prep, tangential excision performed and specimen sent out for permanent section histology.  No complications and routine wound care. Patient told to call our office in 1-2 weeks for result.       2. Nevi, seborrheic keratosis, letnigo, angioma, dermal nevus  3. R calf squamous cell carcinoma   BENIGN LESIONS DISCUSSED WITH PATIENT:  I discussed the specifics of tumor, prognosis, and genetics of benign lesions.  I explained that treatment of these lesions would be purely cosmetic and not medically neccessary.  I discussed with patient different removal options including excision, cautery and /or laser.      Nature and genetics of benign skin lesions dicussed with patient.  Signs and Symptoms of skin cancer discussed with patient.  ABCDEs of melanoma reviewed with patient.  Patient encouraged to perform monthly skin exams.  UV precautions reviewed with patient.  Patient to follow up with Primary Care provider regarding elevated blood pressure.    Skin care regimen reviewed with patient: Eliminate harsh soaps, i.e. Dial, zest, irsih spring; Mild soaps such as Cetaphil or Dove sensitive skin, avoid hot or cold showers, aggressive use of emollients including vanicream, cetaphil or cerave discussed with patient.    Risks of non-melanoma skin cancer discussed with patient   Return to clinic 6 months  PROCEDURE NOTE  R  calf squamous cell carcinoma   MOHS:   Size    After PGACAC discussed with patient, decision for Mohs surgery was made. Indication for Mohs was Size. Patient confirmed the site with Dr. Lopez.  After anesthesia with LEC, the tumor was excised using standard Mohs technique in 1 stages(s).  CLEAR MARGINS OBTAINED and Final defect size was 2.7 x 2.3 cm.       REPAIR SECOND INTENT: We discussed the options for wound management in full with the patient including risks/benefits/possible outcomes. Decision made to allow the wound to heal by second intention. EBL minimal; complications none; wound care routine.  The patient was discharged in good condition and will return in one month or prn for wound evaluation.

## 2020-02-03 LAB — COPATH REPORT: NORMAL

## 2020-02-05 ENCOUNTER — MYC MEDICAL ADVICE (OUTPATIENT)
Dept: DERMATOLOGY | Facility: CLINIC | Age: 60
End: 2020-02-05

## 2020-02-07 NOTE — TELEPHONE ENCOUNTER
Called pt back and he stated that his insurance does not start until March 1st and he needed to wait until then to have his melanoma removed. Pt stated that if this could absolutely not wait then he would figure it out, but would like to wait for insurance. Ok to wait until March 3rd?  Nichol ZHANG RN BSN PHN  [facility]

## 2020-02-07 NOTE — TELEPHONE ENCOUNTER
Reason for Call:  Other appointment    Detailed comments: Pt states he needs to change the date of his appt on 2-12-20.  States he was told to speak to RN to do this.    Phone Number Patient can be reached at: Cell number on file:    Telephone Information:   Mobile [phone]       Best Time:     Can we leave a detailed message on this number? YES    Call taken on 2/7/2020 at 1:32 PM by Sarah Nieto

## 2020-02-16 ENCOUNTER — HEALTH MAINTENANCE LETTER (OUTPATIENT)
Age: 60
End: 2020-02-16

## 2020-02-25 ENCOUNTER — TELEPHONE (OUTPATIENT)
Dept: FAMILY MEDICINE | Facility: OTHER | Age: 60
End: 2020-02-25

## 2020-02-25 NOTE — TELEPHONE ENCOUNTER
Reason for Call:  Other call back    Detailed comments: MAYLIN Mariee from [employer] called clinic still needing PT order. Please fax to: [phone]     Phone Number Patient can be reached at: Home number on file [phone] (home)    Best Time: any    Can we leave a detailed message on this number? YES    Call taken on 2/25/2020 at 10:26 AM by Vinicius Jarquin

## 2020-02-26 ENCOUNTER — TELEPHONE (OUTPATIENT)
Dept: INTERNAL MEDICINE | Facility: CLINIC | Age: 60
End: 2020-02-26

## 2020-02-26 NOTE — TELEPHONE ENCOUNTER
Reason for Call: Request for an order or referral:    Order or referral being requested: PT [facility]    Date needed: as soon as possible    Has the patient been seen by the PCP for this problem? YES    Additional comments: insurance did not receive order for PT. [phone] insurance fax claim number [id number]    Phone number Patient can be reached at:  Home number on file [phone] (home)    Best Time:  any    Can we leave a detailed message on this number?  YES    Call taken on 2/26/2020 at 2:23 PM by Rafia Mosqueda

## 2020-03-03 ENCOUNTER — OFFICE VISIT (OUTPATIENT)
Dept: DERMATOLOGY | Facility: CLINIC | Age: 60
End: 2020-03-03
Payer: COMMERCIAL

## 2020-03-03 VITALS — HEART RATE: 84 BPM | DIASTOLIC BLOOD PRESSURE: 78 MMHG | SYSTOLIC BLOOD PRESSURE: 140 MMHG | OXYGEN SATURATION: 99 %

## 2020-03-03 DIAGNOSIS — D03.59 MELANOMA IN SITU OF BACK (H): Primary | ICD-10-CM

## 2020-03-03 PROCEDURE — 13101 CMPLX RPR TRUNK 2.6-7.5 CM: CPT | Mod: 51 | Performed by: DERMATOLOGY

## 2020-03-03 PROCEDURE — 17313 MOHS 1 STAGE T/A/L: CPT | Performed by: DERMATOLOGY

## 2020-03-03 PROCEDURE — 88342 IMHCHEM/IMCYTCHM 1ST ANTB: CPT | Performed by: DERMATOLOGY

## 2020-03-03 NOTE — PROGRESS NOTES
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 Patient has no other skin complaints today.  Remainder of the HPI, Meds, PMH, Allergies, FH, and SH was reviewed in chart.      Past Medical History:   Diagnosis Date     Hypertension      Malignant melanoma (H)      Squamous cell carcinoma of skin, unspecified        Past Surgical History:   Procedure Laterality Date     HERNIA REPAIR       ORTHOPEDIC SURGERY          Family History   Problem Relation Age of Onset     Hypertension Mother      Lung Cancer Mother      Diabetes Father         prediabetes     Esophageal Cancer Father      Dementia Maternal Grandmother         in 90's     Myocardial Infarction Maternal Grandfather         before 60     Breast Cancer Paternal Grandmother      Stomach Cancer Paternal Grandfather        Social History     Socioeconomic History     Marital status:      Spouse name: Not on file     Number of children: Not on file     Years of education: Not on file     Highest education level: Not on file   Occupational History     Not on file   Social Needs     Financial resource strain: Not on file     Food insecurity:     Worry: Not on file     Inability: Not on file     Transportation needs:     Medical: Not on file     Non-medical: Not on file   Tobacco Use     Smoking status: Never Smoker     Smokeless tobacco: Never Used   Substance and Sexual Activity     Alcohol use: Not Currently     Comment: very rare     Drug use: No     Sexual activity: Yes     Partners: Female   Lifestyle     Physical activity:     Days per week: Not on file     Minutes per session: Not on file     Stress: Not on file   Relationships     Social connections:     Talks on phone: Not on file     Gets together: Not on file     Attends Gnosticist service: Not on file     Active member of club or organization: Not on file     Attends meetings of clubs or organizations: Not on file      Relationship status: Not on file     Intimate partner violence:     Fear of current or ex partner: Not on file     Emotionally abused: Not on file     Physically abused: Not on file     Forced sexual activity: Not on file   Other Topics Concern     Parent/sibling w/ CABG, MI or angioplasty before 65F 55M? No   Social History Narrative     Not on file       Outpatient Encounter Medications as of 3/3/2020   Medication Sig Dispense Refill     lisinopril (PRINIVIL/ZESTRIL) 40 MG tablet Take 1 tablet (40 mg) by mouth daily 90 tablet 3     omeprazole (PRILOSEC) 40 MG DR capsule TAKE ONE CAPSULE BY MOUTH ONCE DAILY 90 capsule 3     No facility-administered encounter medications on file as of 3/3/2020.              Review Of Systems  Skin: As above  Eyes: negative  Ears/Nose/Throat: negative  Respiratory: No shortness of breath, dyspnea on exertion, cough, or hemoptysis  Cardiovascular: negative  Gastrointestinal: negative  Genitourinary: negative  Musculoskeletal: negative  Neurologic: negative  Psychiatric: negative  Hematologic/Lymphatic/Immunologic: negative  Endocrine: negative      O:   NAD, WDWN, Alert & Oriented, Mood & Affect wnl, Vitals stable   Here today alone   BP (!) 140/78   Pulse 84   SpO2 99%    General appearance normal   Vitals stable   Alert, oriented and in no acute distress      Following lymph nodes palpated: Occipital, Cervical, Supraclavicular , axilla no lad   R mid back 1.1cm red plaque      Eyes: Conjunctivae/lids:Normal     ENT: Lips, buccal mucosa, tongue: normal    MSK:Normal    Cardiovascular: peripheral edema none    Pulm: Breathing Normal    Lymph Nodes: No Head and Neck Lymphadenopathy     Neuro/Psych: Orientation:Alert and Orientedx3 ; Mood/Affect:normal       A/P:  1. R mid back melanoma ins itu   MELANOMA DISCUSSED WITH PATIENT:  I discussed the specifics of tumor, prognosis, metachronous melanoma, self exam, and genetics with the patient. I explained the need for monthly skin exams  including and taught the patient how to do this. Patient was asked about new or changing moles . I discussed with patient signs and symptoms that could arise in the setting of recurrent locoregional or metastatic disease. In addition, the need to undergo every 4 month dermatologic full skin survey and evaluation given that patients with a diagnosis of melanoma are at risk of recurrence (local and distant) and of subsequent de su melanoma.  . I reviewed treatment options, including a discussion of wide excision (the gold standard) versus Mohs surgery with MART-1 immunostains.     Note: MART-1 (Melanoma Antigen Recognized by T-cells) antibody immunostaining was used during Mohs surgery as per standard protocol, in addition to routine processing of all specimens with hematoxylin and eosin. The peripheral margins/edges were processed with the MART-1 stain (4 specimens total). The center was examined with hematoxylin & eosin and MART-1 immunostains. The patient was informed of the procedure and its risk/benefits during the consent for the procedure.    One or more of the reagents used in immunohistochemical testing in this case may not have been cleared or approved by the U.[place]. Food and Drug Administration (FDA). The FDA has determined that such clearance or approval is not necessary. These tests are used for clinical purposes. They should not be regarded as investigational or for research. These reagents  performance characteristics have been determined by [facility]. This laboratory is certified under the Clinical Laboratory Improvement Amendments of 1988 (CLIA-88) as qualified to perform high complexity clinical laboratory testing.    After [facility] discussed with patient, decision for Mohs surgery was made. Indication for Mohs was aggressive histology. Patient confirmed the site with Dr. Lopez. After anesthesia with LEC, the tumor was excised using standard Mohs technique in 1 stages(s).  MART 1  stains were performed on 4 specimens. CLEAR MARGINS OBTAINED and Final defect size was 2.3 x 2 cm.     REPAIR COMPLEX: Because of the tightness of the surrounding skin and Because of the size and full thickness nature of the defect, a complex closure was planned. After LEC anesthesia and prep, Burow's triangles were excised in the relaxed skin tension lines. The wound edges were widely undermined by dissection in the subcutaneous plane until adequate tissue mobility was obtained. Hemostasis was obtained. The wound edges were closed in a layered fashion using Vicryl and Fast Absorbing Plain Gut sutures. Postoperative length was 4.7 cm.   EBL minimal; complications none; wound care routine.  The patient was discharged in good condition and will return in one week for wound evaluation.  BENIGN LESIONS DISCUSSED WITH PATIENT:  I discussed the specifics of tumor, prognosis, and genetics of benign lesions.  I explained that treatment of these lesions would be purely cosmetic and not medically neccessary.  I discussed with patient different removal options including excision, cautery and /or laser.      Nature and genetics of benign skin lesions dicussed with patient.  Signs and Symptoms of skin cancer discussed with patient.  ABCDEs of melanoma reviewed with patient.  Patient encouraged to perform monthly skin exams.  UV precautions reviewed with patient.  Patient to follow up with Primary Care provider regarding elevated blood pressure.  Skin care regimen reviewed with patient: Eliminate harsh soaps, i.e. Dial, zest, irsih spring; Mild soaps such as Cetaphil or Dove sensitive skin, avoid hot or cold showers, aggressive use of emollients including vanicream, cetaphil or cerave discussed with patient.    Risks of non-melanoma skin cancer discussed with patient   Return to clinic 6 months

## 2020-03-03 NOTE — LETTER
3/3/2020         RE: Alexsander Sin  [address]  [facility] [address]        Dear Colleague,    Thank you for referring your patient, Alexsander Sin, to the [facility]. Please see a copy of my visit note below.    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 Patient has no other skin complaints today.  Remainder of the HPI, Meds, PMH, Allergies, FH, and SH was reviewed in chart.      Past Medical History:   Diagnosis Date     Hypertension      Malignant melanoma (H)      Squamous cell carcinoma of skin, unspecified        Past Surgical History:   Procedure Laterality Date     HERNIA REPAIR       ORTHOPEDIC SURGERY          Family History   Problem Relation Age of Onset     Hypertension Mother      Lung Cancer Mother      Diabetes Father         prediabetes     Esophageal Cancer Father      Dementia Maternal Grandmother         in 90's     Myocardial Infarction Maternal Grandfather         before 60     Breast Cancer Paternal Grandmother      Stomach Cancer Paternal Grandfather        Social History     Socioeconomic History     Marital status:      Spouse name: Not on file     Number of children: Not on file     Years of education: Not on file     Highest education level: Not on file   Occupational History     Not on file   Social Needs     Financial resource strain: Not on file     Food insecurity:     Worry: Not on file     Inability: Not on file     Transportation needs:     Medical: Not on file     Non-medical: Not on file   Tobacco Use     Smoking status: Never Smoker     Smokeless tobacco: Never Used   Substance and Sexual Activity     Alcohol use: Not Currently     Comment: very rare     Drug use: No     Sexual activity: Yes     Partners: Female   Lifestyle     Physical activity:     Days per  week: Not on file     Minutes per session: Not on file     Stress: Not on file   Relationships     Social connections:     Talks on phone: Not on file     Gets together: Not on file     Attends Jehovah's witness service: Not on file     Active member of club or organization: Not on file     Attends meetings of clubs or organizations: Not on file     Relationship status: Not on file     Intimate partner violence:     Fear of current or ex partner: Not on file     Emotionally abused: Not on file     Physically abused: Not on file     Forced sexual activity: Not on file   Other Topics Concern     Parent/sibling w/ CABG, MI or angioplasty before 65F 55M? No   Social History Narrative     Not on file       Outpatient Encounter Medications as of 3/3/2020   Medication Sig Dispense Refill     lisinopril (PRINIVIL/ZESTRIL) 40 MG tablet Take 1 tablet (40 mg) by mouth daily 90 tablet 3     omeprazole (PRILOSEC) 40 MG DR capsule TAKE ONE CAPSULE BY MOUTH ONCE DAILY 90 capsule 3     No facility-administered encounter medications on file as of 3/3/2020.              Review Of Systems  Skin: As above  Eyes: negative  Ears/Nose/Throat: negative  Respiratory: No shortness of breath, dyspnea on exertion, cough, or hemoptysis  Cardiovascular: negative  Gastrointestinal: negative  Genitourinary: negative  Musculoskeletal: negative  Neurologic: negative  Psychiatric: negative  Hematologic/Lymphatic/Immunologic: negative  Endocrine: negative      O:   NAD, WDWN, Alert & Oriented, Mood & Affect wnl, Vitals stable   Here today alone   BP (!) 140/78   Pulse 84   SpO2 99%    General appearance normal   Vitals stable   Alert, oriented and in no acute distress      Following lymph nodes palpated: Occipital, Cervical, Supraclavicular , axilla no lad   R mid back 1.1cm red plaque      Eyes: Conjunctivae/lids:Normal     ENT: Lips, buccal mucosa, tongue: normal    MSK:Normal    Cardiovascular: peripheral edema none    Pulm: Breathing Normal    Lymph  Nodes: No Head and Neck Lymphadenopathy     Neuro/Psych: Orientation:Alert and Orientedx3 ; Mood/Affect:normal       A/P:  1. R mid back melanoma ins itu   MELANOMA DISCUSSED WITH PATIENT:  I discussed the specifics of tumor, prognosis, metachronous melanoma, self exam, and genetics with the patient. I explained the need for monthly skin exams including and taught the patient how to do this. Patient was asked about new or changing moles . I discussed with patient signs and symptoms that could arise in the setting of recurrent locoregional or metastatic disease. In addition, the need to undergo every 4 month dermatologic full skin survey and evaluation given that patients with a diagnosis of melanoma are at risk of recurrence (local and distant) and of subsequent de su melanoma.  . I reviewed treatment options, including a discussion of wide excision (the gold standard) versus Mohs surgery with MART-1 immunostains.     Note: MART-1 (Melanoma Antigen Recognized by T-cells) antibody immunostaining was used during Mohs surgery as per standard protocol, in addition to routine processing of all specimens with hematoxylin and eosin. The peripheral margins/edges were processed with the MART-1 stain (4 specimens total). The center was examined with hematoxylin & eosin and MART-1 immunostains. The patient was informed of the procedure and its risk/benefits during the consent for the procedure.    One or more of the reagents used in immunohistochemical testing in this case may not have been cleared or approved by the U.S. Food and Drug Administration (FDA). The FDA has determined that such clearance or approval is not necessary. These tests are used for clinical purposes. They should not be regarded as investigational or for research. These reagents  performance characteristics have been determined by [facility]. This laboratory is certified under the Clinical Laboratory Improvement Amendments of 1988  (CLIA-88) as qualified to perform high complexity clinical laboratory testing.    After PGACA discussed with patient, decision for Mohs surgery was made. Indication for Mohs was aggressive histology. Patient confirmed the site with Dr. Lopez. After anesthesia with LEC, the tumor was excised using standard Mohs technique in 1 stages(s).  MART 1 stains were performed on 4 specimens. CLEAR MARGINS OBTAINED and Final defect size was 2.3 x 2 cm.     REPAIR COMPLEX: Because of the tightness of the surrounding skin and Because of the size and full thickness nature of the defect, a complex closure was planned. After LEC anesthesia and prep, Burow's triangles were excised in the relaxed skin tension lines. The wound edges were widely undermined by dissection in the subcutaneous plane until adequate tissue mobility was obtained. Hemostasis was obtained. The wound edges were closed in a layered fashion using Vicryl and Fast Absorbing Plain Gut sutures. Postoperative length was 4.7 cm.   EBL minimal; complications none; wound care routine.  The patient was discharged in good condition and will return in one week for wound evaluation.  BENIGN LESIONS DISCUSSED WITH PATIENT:  I discussed the specifics of tumor, prognosis, and genetics of benign lesions.  I explained that treatment of these lesions would be purely cosmetic and not medically neccessary.  I discussed with patient different removal options including excision, cautery and /or laser.      Nature and genetics of benign skin lesions dicussed with patient.  Signs and Symptoms of skin cancer discussed with patient.  ABCDEs of melanoma reviewed with patient.  Patient encouraged to perform monthly skin exams.  UV precautions reviewed with patient.  Patient to follow up with Primary Care provider regarding elevated blood pressure.  Skin care regimen reviewed with patient: Eliminate harsh soaps, i.e. Dial, zest, irsih spring; Mild soaps such as Cetaphil or Dove sensitive  skin, avoid hot or cold showers, aggressive use of emollients including vanicream, cetaphil or cerave discussed with patient.    Risks of non-melanoma skin cancer discussed with patient   Return to clinic 6 months      Again, thank you for allowing me to participate in the care of your patient.        Sincerely,        Bobo Lopez MD

## 2020-03-03 NOTE — PATIENT INSTRUCTIONS
Sutured Wound Care     [facility]: [phone]    [facility]: [phone]          ? No strenuous activity for 48 hours. Resume moderate activity in 48 hours. No heavy exercising until you are seen for follow up in one week.     ? Take Tylenol as needed for discomfort.                         ? Do not drink alcoholic beverages for 48 hours.     ? Keep the pressure bandage in place for 24 hours. If the bandage becomes blood tinged or loose, reinforce it with gauze and tape.        (Refer to the reverse side of this page for management of bleeding).    ? Remove pressure bandage in 24 hours     ? Leave the flat bandage in place until your follow up appointment.    ? Keep the bandage dry. Wash around it carefully.    ? If the tape becomes soiled or starts to come off, reinforce it with additional paper tape.    ? Do not smoke for 3 weeks; smoking is detrimental to wound healing.    ? It is normal to have swelling and bruising around the surgical site. The bruising will fade in approximately 10-14 days. Elevate the area to reduce swelling.    ? Numbness, itchiness and sensitivity to temperature changes can occur after surgery and may take up to 18 months to normalize.      POSSIBLE COMPLICATIONS    BLEEDIN. Leave the bandage in place.  2. Use tightly rolled up gauze or a cloth to apply direct pressure over the bandage for 20   minutes.  3. Reapply pressure for an additional 20 minutes if necessary  4. Call the office or go to the nearest emergency room if pressure fails to stop the bleeding.  5. Use additional gauze and tape to maintain pressure once the bleeding has stopped.        PAIN:    1. Post operative pain should slowly get better, never worse.  2. A severe increase in pain may indicate a problem. Call the office if this occurs.    In case of emergency phone:Dr Lopez [phone]

## 2020-03-03 NOTE — NURSING NOTE
Chief Complaint   Patient presents with     Derm Problem     mohs       Vitals:    03/03/20 0724   BP: (!) 140/78   Pulse: 84   SpO2: 99%     Wt Readings from Last 1 Encounters:   01/22/20 97.1 kg (214 lb)       Yulisa Isbell LPN.................3/3/2020

## 2020-03-10 ENCOUNTER — ALLIED HEALTH/NURSE VISIT (OUTPATIENT)
Dept: DERMATOLOGY | Facility: CLINIC | Age: 60
End: 2020-03-10
Payer: COMMERCIAL

## 2020-03-10 DIAGNOSIS — Z48.01 ENCOUNTER FOR CHANGE OR REMOVAL OF SURGICAL WOUND DRESSING: Primary | ICD-10-CM

## 2020-03-10 PROCEDURE — 99207 ZZC NO CHARGE NURSE ONLY: CPT

## 2020-03-10 NOTE — PATIENT INSTRUCTIONS
WOUND CARE INSTRUCTIONS  for  ONE WEEK AFTER SURGERY    back      1) Leave flat bandage on your skin for one week after today s bandage change.  2) In one week when you remove the bandage, you may resume your regular skin care routine, including washing with mild soap and water, applying moisturizer, make-up and sunscreen.    3) If there are any open or bleeding areas at the incision/graft site you should begin to cover the area with a bandage daily as follows:    1) Clean and dry the area with plain tap water using a Q-tip or sterile gauze pad.  2) Apply Polysporin or Bacitracin ointment to the open area.  3) Cover the wound with a band-aid or a sterile non-stick gauze pad and micropore paper tape.         SIGNS OF INFECTION  - If you notice any of these signs of infection, call your doctor right away: expanding redness around the wound.  - Yellow or greenish-colored pus or cloudy wound drainage.    - Red streaking spreading from the wound.  - Increased swelling, tenderness, or pain around the wound.   - Fever.    Please remember that yellow and clear drainage from a wound can be normal and related to normal wound healing.  Isolated drainage from a wound without a combination of the above features does not indicate infection.       *Once the bandages are removed, the scar will be red and firm (especially in the lip/chin area). This is normal and will fade in time. It might take 6-12 months for this to happen.     *Massaging the area will help the scar soften and fade quicker. Begin to massage the area one month after the bandages have been removed. To massage apply pressure directly and firmly over the scar with the fingertips and move in a circular motion. Massage the area for a few minutes several times a day. Continue to massage the site for several months.    *Approximately 6-8 weeks after surgery it is not uncommon to see the formation of  tender pimple-like  bump along the scar. This is normal. As the scar  continues to mature and the stitches underneath the skin begin to dissolve, this might occur. Do not pick or squeeze, this will resolve on it s own. Should one break open producing a small amount of drainage, apply Polysporin or Bacitracin ointment a few times a day until the wound is completely healed.    *Numbness in the surgical area is expected. It might take 12-18 months for the feeling to return to normal. During this time sensations of itchiness, tingling and occasional sharp pains might be noted. These feelings are normal and will subside once the nerves have completely healed.         IN CASE OF EMERGENCY: Dr Lopez [phone]     If you were seen in [place] call: [phone]    If you were seen in [place] call: [phone]

## 2020-03-10 NOTE — PROGRESS NOTES
Pt returned to clinic for post surgery 1 week follow up bandage change. Pt has no complaints, denies pain. Bandage removed from back, area cleansed with normal saline. Site is healing and wound edges approximating well. Reapplied new steri strips and paper tape.    Advised to watch for signs/sx of infection; spreading redness, drainage, odor, fever. Call or report promptly to clinic. Pt given written instructions and informed to rtc as needed. Patient verbalized understanding.     Yulisa Isbell LPN.................3/10/2020

## 2020-07-08 ENCOUNTER — OFFICE VISIT (OUTPATIENT)
Dept: DERMATOLOGY | Facility: CLINIC | Age: 60
End: 2020-07-08
Payer: COMMERCIAL

## 2020-07-08 VITALS — OXYGEN SATURATION: 98 % | SYSTOLIC BLOOD PRESSURE: 135 MMHG | HEART RATE: 66 BPM | DIASTOLIC BLOOD PRESSURE: 89 MMHG

## 2020-07-08 DIAGNOSIS — D18.01 ANGIOMA OF SKIN: ICD-10-CM

## 2020-07-08 DIAGNOSIS — L82.1 SEBORRHEIC KERATOSIS: ICD-10-CM

## 2020-07-08 DIAGNOSIS — D23.9 DERMAL NEVUS: ICD-10-CM

## 2020-07-08 DIAGNOSIS — D22.9 NEVUS: ICD-10-CM

## 2020-07-08 DIAGNOSIS — Z86.006 HISTORY OF MELANOMA IN SITU: Primary | ICD-10-CM

## 2020-07-08 DIAGNOSIS — L81.4 LENTIGO: ICD-10-CM

## 2020-07-08 PROCEDURE — 99213 OFFICE O/P EST LOW 20 MIN: CPT | Performed by: DERMATOLOGY

## 2020-07-08 NOTE — LETTER
7/8/2020         RE: Alexsander Sin  [address] [facility] [address]        Dear Colleague,    Thank you for referring your patient, Alexsander Sin, to the [facility]. Please see a copy of my visit note below.    Alexsander Sin is a 59 year old year old male patient here today for hx of melanoma in situ.  He denies any new or changing skin lesions.  Patient reports the following symptoms:  none.  Patient reports the following previous treatments none.  These treatments did not work.  Patient reports the following modifying factors none.  Associated symptoms: none.  Patient has no other skin complaints today.  Remainder of the HPI, Meds, PMH, Allergies, FH, and SH was reviewed in chart.      Past Medical History:   Diagnosis Date     Hypertension      Malignant melanoma (H)      Squamous cell carcinoma of skin, unspecified        Past Surgical History:   Procedure Laterality Date     HERNIA REPAIR       ORTHOPEDIC SURGERY          Family History   Problem Relation Age of Onset     Hypertension Mother      Lung Cancer Mother      Diabetes Father         prediabetes     Esophageal Cancer Father      Dementia Maternal Grandmother         in 90's     Myocardial Infarction Maternal Grandfather         before 60     Breast Cancer Paternal Grandmother      Stomach Cancer Paternal Grandfather        Social History     Socioeconomic History     Marital status:      Spouse name: Not on file     Number of children: Not on file     Years of education: Not on file     Highest education level: Not on file   Occupational History     Not on file   Social Needs     Financial resource strain: Not on file     Food insecurity     Worry: Not on file     Inability: Not on file     Transportation needs     Medical: Not on file     Non-medical: Not on file   Tobacco Use     Smoking status: Never Smoker     Smokeless tobacco: Never Used   Substance and Sexual Activity     Alcohol use: Not Currently      Comment: very rare     Drug use: No     Sexual activity: Yes     Partners: Female   Lifestyle     Physical activity     Days per week: Not on file     Minutes per session: Not on file     Stress: Not on file   Relationships     Social connections     Talks on phone: Not on file     Gets together: Not on file     Attends Protestant service: Not on file     Active member of club or organization: Not on file     Attends meetings of clubs or organizations: Not on file     Relationship status: Not on file     Intimate partner violence     Fear of current or ex partner: Not on file     Emotionally abused: Not on file     Physically abused: Not on file     Forced sexual activity: Not on file   Other Topics Concern     Parent/sibling w/ CABG, MI or angioplasty before 65F 55M? No   Social History Narrative     Not on file       Outpatient Encounter Medications as of 7/8/2020   Medication Sig Dispense Refill     lisinopril (PRINIVIL/ZESTRIL) 40 MG tablet Take 1 tablet (40 mg) by mouth daily 90 tablet 3     omeprazole (PRILOSEC) 40 MG DR capsule TAKE ONE CAPSULE BY MOUTH ONCE DAILY 90 capsule 3     No facility-administered encounter medications on file as of 7/8/2020.              Review Of Systems  Skin: As above  Eyes: negative  Ears/Nose/Throat: negative  Respiratory: No shortness of breath, dyspnea on exertion, cough, or hemoptysis  Cardiovascular: negative  Gastrointestinal: negative  Genitourinary: negative  Musculoskeletal: negative  Neurologic: negative  Psychiatric: negative  Hematologic/Lymphatic/Immunologic: negative  Endocrine: negative      O:   NAD, WDWN, Alert & Oriented, Mood & Affect wnl, Vitals stable   Here today alone   /89 (BP Location: Right arm, Patient Position: Sitting, Cuff Size: Adult Large)   Pulse 66   SpO2 98%    General appearance normal   Vitals stable   Alert, oriented and in no acute distress      Following lymph nodes palpated: Occipital, Cervical, Supraclavicular no lad   Back well  healed   pigmentedm acule son trunk and ext with regular b orders and pigemnt networks      Stuck on papules and brown macules on trunk and ext   Red papules on trunk  Flesh colored papules on trunk     The remainder of the full exam was normal; the following areas were examined:  conjunctiva/lids, oral mucosa, neck, peripheral vascular system, abdomen, lymph nodes, digits/nails, eccrine and apocrine glands, scalp/hair, face, neck, chest, abdomen, buttocks, back, RUE, LUE, RLE, LLE       Eyes: Conjunctivae/lids:Normal     ENT: Lips, buccal mucosa, tongue: normal    MSK:Normal    Cardiovascular: peripheral edema none    Pulm: Breathing Normal    Lymph Nodes: No Head and Neck Lymphadenopathy     Neuro/Psych: Orientation:Alert and Orientedx3 ; Mood/Affect:normal       A/P:  1. Seborrheic keratosis, lentigo, angioma, dermal nevus, nevi,hx of mis   BENIGN LESIONS DISCUSSED WITH PATIENT:  I discussed the specifics of tumor, prognosis, and genetics of benign lesions.  I explained that treatment of these lesions would be purely cosmetic and not medically neccessary.  I discussed with patient different removal options including excision, cautery and /or laser.      Nature and genetics of benign skin lesions dicussed with patient.  Signs and Symptoms of skin cancer discussed with patient.  ABCDEs of melanoma reviewed with patient.  Patient encouraged to perform monthly skin exams.  UV precautions reviewed with patient.  Skin care regimen reviewed with patient: Eliminate harsh soaps, i.e. Dial, zest, irsih spring; Mild soaps such as Cetaphil or Dove sensitive skin, avoid hot or cold showers, aggressive use of emollients including vanicream, cetaphil or cerave discussed with patient.    Risks of non-melanoma skin cancer discussed with patient   Return to clinic 6 months      Again, thank you for allowing me to participate in the care of your patient.        Sincerely,        Bobo Lopez MD

## 2020-07-08 NOTE — PROGRESS NOTES
Alexsander Sin is a 59 year old year old male patient here today for hx of melanoma in situ.  He denies any new or changing skin lesions.  Patient reports the following symptoms:  none.  Patient reports the following previous treatments none.  These treatments did not work.  Patient reports the following modifying factors none.  Associated symptoms: none.  Patient has no other skin complaints today.  Remainder of the HPI, Meds, PMH, Allergies, FH, and SH was reviewed in chart.      Past Medical History:   Diagnosis Date     Hypertension      Malignant melanoma (H)      Squamous cell carcinoma of skin, unspecified        Past Surgical History:   Procedure Laterality Date     HERNIA REPAIR       ORTHOPEDIC SURGERY          Family History   Problem Relation Age of Onset     Hypertension Mother      Lung Cancer Mother      Diabetes Father         prediabetes     Esophageal Cancer Father      Dementia Maternal Grandmother         in 90's     Myocardial Infarction Maternal Grandfather         before 60     Breast Cancer Paternal Grandmother      Stomach Cancer Paternal Grandfather        Social History     Socioeconomic History     Marital status:      Spouse name: Not on file     Number of children: Not on file     Years of education: Not on file     Highest education level: Not on file   Occupational History     Not on file   Social Needs     Financial resource strain: Not on file     Food insecurity     Worry: Not on file     Inability: Not on file     Transportation needs     Medical: Not on file     Non-medical: Not on file   Tobacco Use     Smoking status: Never Smoker     Smokeless tobacco: Never Used   Substance and Sexual Activity     Alcohol use: Not Currently     Comment: very rare     Drug use: No     Sexual activity: Yes     Partners: Female   Lifestyle     Physical activity     Days per week: Not on file     Minutes per session: Not on file     Stress: Not on file   Relationships     Social  connections     Talks on phone: Not on file     Gets together: Not on file     Attends Cheondoism service: Not on file     Active member of club or organization: Not on file     Attends meetings of clubs or organizations: Not on file     Relationship status: Not on file     Intimate partner violence     Fear of current or ex partner: Not on file     Emotionally abused: Not on file     Physically abused: Not on file     Forced sexual activity: Not on file   Other Topics Concern     Parent/sibling w/ CABG, MI or angioplasty before 65F 55M? No   Social History Narrative     Not on file       Outpatient Encounter Medications as of 7/8/2020   Medication Sig Dispense Refill     lisinopril (PRINIVIL/ZESTRIL) 40 MG tablet Take 1 tablet (40 mg) by mouth daily 90 tablet 3     omeprazole (PRILOSEC) 40 MG DR capsule TAKE ONE CAPSULE BY MOUTH ONCE DAILY 90 capsule 3     No facility-administered encounter medications on file as of 7/8/2020.              Review Of Systems  Skin: As above  Eyes: negative  Ears/Nose/Throat: negative  Respiratory: No shortness of breath, dyspnea on exertion, cough, or hemoptysis  Cardiovascular: negative  Gastrointestinal: negative  Genitourinary: negative  Musculoskeletal: negative  Neurologic: negative  Psychiatric: negative  Hematologic/Lymphatic/Immunologic: negative  Endocrine: negative      O:   NAD, WDWN, Alert & Oriented, Mood & Affect wnl, Vitals stable   Here today alone   /89 (BP Location: Right arm, Patient Position: Sitting, Cuff Size: Adult Large)   Pulse 66   SpO2 98%    General appearance normal   Vitals stable   Alert, oriented and in no acute distress      Following lymph nodes palpated: Occipital, Cervical, Supraclavicular no lad   Back well healed   pigmentedm acule son trunk and ext with regular b orders and pigemnt networks      Stuck on papules and brown macules on trunk and ext   Red papules on trunk  Flesh colored papules on trunk     The remainder of the full exam was  normal; the following areas were examined:  conjunctiva/lids, oral mucosa, neck, peripheral vascular system, abdomen, lymph nodes, digits/nails, eccrine and apocrine glands, scalp/hair, face, neck, chest, abdomen, buttocks, back, RUE, LUE, RLE, LLE       Eyes: Conjunctivae/lids:Normal     ENT: Lips, buccal mucosa, tongue: normal    MSK:Normal    Cardiovascular: peripheral edema none    Pulm: Breathing Normal    Lymph Nodes: No Head and Neck Lymphadenopathy     Neuro/Psych: Orientation:Alert and Orientedx3 ; Mood/Affect:normal       A/P:  1. Seborrheic keratosis, lentigo, angioma, dermal nevus, nevi,hx of mis   BENIGN LESIONS DISCUSSED WITH PATIENT:  I discussed the specifics of tumor, prognosis, and genetics of benign lesions.  I explained that treatment of these lesions would be purely cosmetic and not medically neccessary.  I discussed with patient different removal options including excision, cautery and /or laser.      Nature and genetics of benign skin lesions dicussed with patient.  Signs and Symptoms of skin cancer discussed with patient.  ABCDEs of melanoma reviewed with patient.  Patient encouraged to perform monthly skin exams.  UV precautions reviewed with patient.  Skin care regimen reviewed with patient: Eliminate harsh soaps, i.e. Dial, zest, irsih spring; Mild soaps such as Cetaphil or Dove sensitive skin, avoid hot or cold showers, aggressive use of emollients including vanicream, cetaphil or cerave discussed with patient.    Risks of non-melanoma skin cancer discussed with patient   Return to clinic 6 months

## 2020-11-22 ENCOUNTER — HEALTH MAINTENANCE LETTER (OUTPATIENT)
Age: 60
End: 2020-11-22

## 2021-01-06 NOTE — NURSING NOTE
"Initial /89 (BP Location: Right arm, Patient Position: Sitting, Cuff Size: Adult Large)   Pulse 66   SpO2 98%  Estimated body mass index is 30.79 kg/m  as calculated from the following:    Height as of 1/22/20: 1.775 m (5' 9.9\").    Weight as of 1/22/20: 97.1 kg (214 lb). .      " Consent: Written consent obtained. Risks include but not limited to lid/brow ptosis, bruising, swelling, diplopia, temporary effect, incomplete chemical denervation.

## 2021-01-11 DIAGNOSIS — I10 BENIGN ESSENTIAL HYPERTENSION: ICD-10-CM

## 2021-01-11 DIAGNOSIS — K21.9 GASTROESOPHAGEAL REFLUX DISEASE WITHOUT ESOPHAGITIS: ICD-10-CM

## 2021-01-11 RX ORDER — OMEPRAZOLE 40 MG/1
CAPSULE, DELAYED RELEASE ORAL
Qty: 90 CAPSULE | Refills: 0 | Status: SHIPPED | OUTPATIENT
Start: 2021-01-11 | End: 2021-04-21

## 2021-01-11 RX ORDER — LISINOPRIL 40 MG/1
TABLET ORAL
Qty: 90 TABLET | Refills: 0 | Status: SHIPPED | OUTPATIENT
Start: 2021-01-11 | End: 2021-04-21

## 2021-01-11 NOTE — TELEPHONE ENCOUNTER
Prescription approved per [facility].    Sheyla Solorio RN

## 2021-01-12 ENCOUNTER — OFFICE VISIT (OUTPATIENT)
Dept: DERMATOLOGY | Facility: CLINIC | Age: 61
End: 2021-01-12
Payer: COMMERCIAL

## 2021-01-12 VITALS — HEART RATE: 86 BPM | OXYGEN SATURATION: 98 % | SYSTOLIC BLOOD PRESSURE: 136 MMHG | DIASTOLIC BLOOD PRESSURE: 81 MMHG

## 2021-01-12 DIAGNOSIS — L82.1 SEBORRHEIC KERATOSIS: ICD-10-CM

## 2021-01-12 DIAGNOSIS — D22.9 NEVUS: ICD-10-CM

## 2021-01-12 DIAGNOSIS — D18.01 ANGIOMA OF SKIN: ICD-10-CM

## 2021-01-12 DIAGNOSIS — L81.4 LENTIGO: ICD-10-CM

## 2021-01-12 DIAGNOSIS — Z86.006 HISTORY OF MELANOMA IN SITU: Primary | ICD-10-CM

## 2021-01-12 DIAGNOSIS — D23.9 DERMAL NEVUS: ICD-10-CM

## 2021-01-12 PROCEDURE — 99213 OFFICE O/P EST LOW 20 MIN: CPT | Performed by: DERMATOLOGY

## 2021-01-12 NOTE — LETTER
1/12/2021         RE: Alexsander Sin  [address] [facility] [address]        Dear Colleague,    Thank you for referring your patient, Alexsander Sin, to the [facility]. Please see a copy of my visit note below.    Alexsander Sin is an extremely pleasant 60 year old year old male patient here today for hx of non-melanoma skin cancer and melanoma in situ.  He denies any new or changing skin lesions.  Patient reports the following symptoms:  none.  Patient reports the following modifying factors none.  Associated symptoms: none.  Patient has no other skin complaints today.  Remainder of the HPI, Meds, PMH, Allergies, FH, and SH was reviewed in chart.      Past Medical History:   Diagnosis Date     Hypertension      Malignant melanoma (H)      Squamous cell carcinoma of skin, unspecified        Past Surgical History:   Procedure Laterality Date     HERNIA REPAIR       ORTHOPEDIC SURGERY          Family History   Problem Relation Age of Onset     Hypertension Mother      Lung Cancer Mother      Diabetes Father         prediabetes     Esophageal Cancer Father      Dementia Maternal Grandmother         in 90's     Myocardial Infarction Maternal Grandfather         before 60     Breast Cancer Paternal Grandmother      Stomach Cancer Paternal Grandfather        Social History     Socioeconomic History     Marital status:      Spouse name: Not on file     Number of children: Not on file     Years of education: Not on file     Highest education level: Not on file   Occupational History     Not on file   Social Needs     Financial resource strain: Not on file     Food insecurity     Worry: Not on file     Inability: Not on file     Transportation needs     Medical: Not on file     Non-medical: Not on file   Tobacco Use     Smoking status: Never Smoker     Smokeless tobacco: Never Used   Substance and Sexual Activity     Alcohol use: Not Currently     Comment: very rare     Drug  use: No     Sexual activity: Yes     Partners: Female   Lifestyle     Physical activity     Days per week: Not on file     Minutes per session: Not on file     Stress: Not on file   Relationships     Social connections     Talks on phone: Not on file     Gets together: Not on file     Attends Scientology service: Not on file     Active member of club or organization: Not on file     Attends meetings of clubs or organizations: Not on file     Relationship status: Not on file     Intimate partner violence     Fear of current or ex partner: Not on file     Emotionally abused: Not on file     Physically abused: Not on file     Forced sexual activity: Not on file   Other Topics Concern     Parent/sibling w/ CABG, MI or angioplasty before 65F 55M? No   Social History Narrative     Not on file       Outpatient Encounter Medications as of 1/12/2021   Medication Sig Dispense Refill     lisinopril (ZESTRIL) 40 MG tablet TAKE ONE TABLET BY MOUTH ONCE DAILY 90 tablet 0     omeprazole (PRILOSEC) 40 MG DR capsule TAKE ONE CAPSULE BY MOUTH ONCE DAILY 90 capsule 0     No facility-administered encounter medications on file as of 1/12/2021.              Review Of Systems  Skin: As above  Eyes: negative  Ears/Nose/Throat: negative  Respiratory: No shortness of breath, dyspnea on exertion, cough, or hemoptysis  Cardiovascular: negative  Gastrointestinal: negative  Genitourinary: negative  Musculoskeletal: negative  Neurologic: negative  Psychiatric: negative  Hematologic/Lymphatic/Immunologic: negative  Endocrine: negative      O:   NAD, WDWN, Alert & Oriented, Mood & Affect wnl, Vitals stable   Here today alone  /81   Pulse 86   SpO2 98%    General appearance normal   Vitals stable   Alert, oriented and in no acute distress      Following lymph nodes palpated: Occipital, Cervical, Supraclavicular , inguinal no lad   Pigmented macule son trunk and ext with regular borders and pigment networks      Stuck on papules and brown  macules on trunk and ext   Red papules on trunk  Flesh colored papules on trunk     The remainder of the full exam was normal; the following areas were examined:  conjunctiva/lids, oral mucosa, neck, peripheral vascular system, abdomen, lymph nodes, digits/nails, eccrine and apocrine glands, scalp/hair, face, neck, chest, abdomen, buttocks, back, RUE, LUE, RLE, LLE       Eyes: Conjunctivae/lids:Normal     ENT: Lips, buccal mucosa, tongue: normal    MSK:Normal    Cardiovascular: peripheral edema none    Pulm: Breathing Normal    Lymph Nodes: No Head and Neck Lymphadenopathy     Neuro/Psych: Orientation:Alert and Orientedx3 ; Mood/Affect:normal       A/P:  1. Seborrheic keratosis, lentigo, angioma, dermal nevus, nevi, hx of mis, hx of non-melanoma skin cancer   It was a pleasure speaking to Alexsander Sin today.  This is an chronic issue with no systemic symptoms and no exacerbation of disease.     Previous clinic  notes and pertinent laboratory tests were reviewed prior to Alexsander Sin's visit.  BENIGN LESIONS DISCUSSED WITH PATIENT:  I discussed the specifics of tumor, prognosis, and genetics of benign lesions.  I explained that treatment of these lesions would be purely cosmetic and not medically neccessary.  I discussed with patient different removal options including excision, cautery and /or laser.      Nature and genetics of benign skin lesions dicussed with patient.  Signs and Symptoms of skin cancer discussed with patient.  Patient encouraged to perform monthly skin exams.  UV precautions reviewed with patient.  Skin care regimen reviewed with patient: Eliminate harsh soaps, i.e. Dial, zest, irsih spring; Mild soaps such as Cetaphil or Dove sensitive skin, avoid hot or cold showers, aggressive use of emollients including vanicream, cetaphil or cerave discussed with patient.    Risks of non-melanoma skin cancer discussed with patient   Return to clinic 6 months        Again, thank you for allowing me to  participate in the care of your patient.        Sincerely,        Bobo Lopez MD

## 2021-01-12 NOTE — PROGRESS NOTES
Alexsander Sin is an extremely pleasant 60 year old year old male patient here today for hx of non-melanoma skin cancer and melanoma in situ.  He denies any new or changing skin lesions.  Patient reports the following symptoms:  none.  Patient reports the following modifying factors none.  Associated symptoms: none.  Patient has no other skin complaints today.  Remainder of the HPI, Meds, PMH, Allergies, FH, and SH was reviewed in chart.      Past Medical History:   Diagnosis Date     Hypertension      Malignant melanoma (H)      Squamous cell carcinoma of skin, unspecified        Past Surgical History:   Procedure Laterality Date     HERNIA REPAIR       ORTHOPEDIC SURGERY          Family History   Problem Relation Age of Onset     Hypertension Mother      Lung Cancer Mother      Diabetes Father         prediabetes     Esophageal Cancer Father      Dementia Maternal Grandmother         in 90's     Myocardial Infarction Maternal Grandfather         before 60     Breast Cancer Paternal Grandmother      Stomach Cancer Paternal Grandfather        Social History     Socioeconomic History     Marital status:      Spouse name: Not on file     Number of children: Not on file     Years of education: Not on file     Highest education level: Not on file   Occupational History     Not on file   Social Needs     Financial resource strain: Not on file     Food insecurity     Worry: Not on file     Inability: Not on file     Transportation needs     Medical: Not on file     Non-medical: Not on file   Tobacco Use     Smoking status: Never Smoker     Smokeless tobacco: Never Used   Substance and Sexual Activity     Alcohol use: Not Currently     Comment: very rare     Drug use: No     Sexual activity: Yes     Partners: Female   Lifestyle     Physical activity     Days per week: Not on file     Minutes per session: Not on file     Stress: Not on file   Relationships     Social connections     Talks on phone: Not on file      Gets together: Not on file     Attends Latter day service: Not on file     Active member of club or organization: Not on file     Attends meetings of clubs or organizations: Not on file     Relationship status: Not on file     Intimate partner violence     Fear of current or ex partner: Not on file     Emotionally abused: Not on file     Physically abused: Not on file     Forced sexual activity: Not on file   Other Topics Concern     Parent/sibling w/ CABG, MI or angioplasty before 65F 55M? No   Social History Narrative     Not on file       Outpatient Encounter Medications as of 1/12/2021   Medication Sig Dispense Refill     lisinopril (ZESTRIL) 40 MG tablet TAKE ONE TABLET BY MOUTH ONCE DAILY 90 tablet 0     omeprazole (PRILOSEC) 40 MG DR capsule TAKE ONE CAPSULE BY MOUTH ONCE DAILY 90 capsule 0     No facility-administered encounter medications on file as of 1/12/2021.              Review Of Systems  Skin: As above  Eyes: negative  Ears/Nose/Throat: negative  Respiratory: No shortness of breath, dyspnea on exertion, cough, or hemoptysis  Cardiovascular: negative  Gastrointestinal: negative  Genitourinary: negative  Musculoskeletal: negative  Neurologic: negative  Psychiatric: negative  Hematologic/Lymphatic/Immunologic: negative  Endocrine: negative      O:   NAD, WDWN, Alert & Oriented, Mood & Affect wnl, Vitals stable   Here today alone  /81   Pulse 86   SpO2 98%    General appearance normal   Vitals stable   Alert, oriented and in no acute distress      Following lymph nodes palpated: Occipital, Cervical, Supraclavicular , inguinal no lad   Pigmented macule son trunk and ext with regular borders and pigment networks      Stuck on papules and brown macules on trunk and ext   Red papules on trunk  Flesh colored papules on trunk     The remainder of the full exam was normal; the following areas were examined:  conjunctiva/lids, oral mucosa, neck, peripheral vascular system, abdomen, lymph nodes,  digits/nails, eccrine and apocrine glands, scalp/hair, face, neck, chest, abdomen, buttocks, back, RUE, LUE, RLE, LLE       Eyes: Conjunctivae/lids:Normal     ENT: Lips, buccal mucosa, tongue: normal    MSK:Normal    Cardiovascular: peripheral edema none    Pulm: Breathing Normal    Lymph Nodes: No Head and Neck Lymphadenopathy     Neuro/Psych: Orientation:Alert and Orientedx3 ; Mood/Affect:normal       A/P:  1. Seborrheic keratosis, lentigo, angioma, dermal nevus, nevi, hx of mis, hx of non-melanoma skin cancer   It was a pleasure speaking to Alexsander Sin today.  This is an chronic issue with no systemic symptoms and no exacerbation of disease.     Previous clinic  notes and pertinent laboratory tests were reviewed prior to Alexsander Sin's visit.  BENIGN LESIONS DISCUSSED WITH PATIENT:  I discussed the specifics of tumor, prognosis, and genetics of benign lesions.  I explained that treatment of these lesions would be purely cosmetic and not medically neccessary.  I discussed with patient different removal options including excision, cautery and /or laser.      Nature and genetics of benign skin lesions dicussed with patient.  Signs and Symptoms of skin cancer discussed with patient.  Patient encouraged to perform monthly skin exams.  UV precautions reviewed with patient.  Skin care regimen reviewed with patient: Eliminate harsh soaps, i.e. Dial, zest, irsih spring; Mild soaps such as Cetaphil or Dove sensitive skin, avoid hot or cold showers, aggressive use of emollients including vanicream, cetaphil or cerave discussed with patient.    Risks of non-melanoma skin cancer discussed with patient   Return to clinic 6 months

## 2021-04-04 ENCOUNTER — HEALTH MAINTENANCE LETTER (OUTPATIENT)
Age: 61
End: 2021-04-04

## 2021-04-20 DIAGNOSIS — I10 BENIGN ESSENTIAL HYPERTENSION: ICD-10-CM

## 2021-04-20 DIAGNOSIS — K21.9 GASTROESOPHAGEAL REFLUX DISEASE WITHOUT ESOPHAGITIS: ICD-10-CM

## 2021-04-21 RX ORDER — LISINOPRIL 40 MG/1
TABLET ORAL
Qty: 90 TABLET | Refills: 0 | Status: SHIPPED | OUTPATIENT
Start: 2021-04-21 | End: 2021-05-21

## 2021-04-21 RX ORDER — OMEPRAZOLE 40 MG/1
CAPSULE, DELAYED RELEASE ORAL
Qty: 90 CAPSULE | Refills: 0 | Status: SHIPPED | OUTPATIENT
Start: 2021-04-21 | End: 2021-05-21

## 2021-04-21 NOTE — TELEPHONE ENCOUNTER
Medication is being filled for 1 time refill only due to:  Patient needs to be seen because it has been more than one year since last visit.     Routing to team to set up appointment for patient for physical/medication check.  Patient has been given #90 to get to appointment per triage protocol.    Pilar Berry, IKEN, RN  [facility]

## 2021-05-21 ENCOUNTER — OFFICE VISIT (OUTPATIENT)
Dept: INTERNAL MEDICINE | Facility: CLINIC | Age: 61
End: 2021-05-21
Payer: COMMERCIAL

## 2021-05-21 VITALS
WEIGHT: 214 LBS | HEIGHT: 69 IN | SYSTOLIC BLOOD PRESSURE: 126 MMHG | RESPIRATION RATE: 16 BRPM | HEART RATE: 90 BPM | OXYGEN SATURATION: 98 % | TEMPERATURE: 98.1 F | DIASTOLIC BLOOD PRESSURE: 84 MMHG | BODY MASS INDEX: 31.7 KG/M2

## 2021-05-21 DIAGNOSIS — K21.00 GASTROESOPHAGEAL REFLUX DISEASE WITH ESOPHAGITIS WITHOUT HEMORRHAGE: ICD-10-CM

## 2021-05-21 DIAGNOSIS — Z12.11 COLON CANCER SCREENING: ICD-10-CM

## 2021-05-21 DIAGNOSIS — D03.59 MELANOMA IN SITU OF BACK (H): ICD-10-CM

## 2021-05-21 DIAGNOSIS — I10 BENIGN ESSENTIAL HYPERTENSION: Primary | ICD-10-CM

## 2021-05-21 DIAGNOSIS — Z80.0 FAMILY HISTORY OF ESOPHAGEAL CANCER: ICD-10-CM

## 2021-05-21 LAB
ALBUMIN SERPL-MCNC: 4.1 G/DL (ref 3.4–5)
ALP SERPL-CCNC: 50 U/L (ref 40–150)
ALT SERPL W P-5'-P-CCNC: 43 U/L (ref 0–70)
ANION GAP SERPL CALCULATED.3IONS-SCNC: 4 MMOL/L (ref 3–14)
AST SERPL W P-5'-P-CCNC: 19 U/L (ref 0–45)
BILIRUB SERPL-MCNC: 0.8 MG/DL (ref 0.2–1.3)
BUN SERPL-MCNC: 14 MG/DL (ref 7–30)
CALCIUM SERPL-MCNC: 9 MG/DL (ref 8.5–10.1)
CHLORIDE SERPL-SCNC: 104 MMOL/L (ref 94–109)
CHOLEST SERPL-MCNC: 185 MG/DL
CO2 SERPL-SCNC: 30 MMOL/L (ref 20–32)
CREAT SERPL-MCNC: 0.94 MG/DL (ref 0.66–1.25)
CREAT UR-MCNC: 122 MG/DL
GFR SERPL CREATININE-BSD FRML MDRD: 88 ML/MIN/{1.73_M2}
GLUCOSE SERPL-MCNC: 101 MG/DL (ref 70–99)
HDLC SERPL-MCNC: 48 MG/DL
LDLC SERPL CALC-MCNC: 102 MG/DL
MICROALBUMIN UR-MCNC: 7 MG/L
MICROALBUMIN/CREAT UR: 5.49 MG/G CR (ref 0–17)
NONHDLC SERPL-MCNC: 137 MG/DL
POTASSIUM SERPL-SCNC: 4.1 MMOL/L (ref 3.4–5.3)
PROT SERPL-MCNC: 7.4 G/DL (ref 6.8–8.8)
SODIUM SERPL-SCNC: 138 MMOL/L (ref 133–144)
TRIGL SERPL-MCNC: 173 MG/DL

## 2021-05-21 PROCEDURE — 80053 COMPREHEN METABOLIC PANEL: CPT | Performed by: INTERNAL MEDICINE

## 2021-05-21 PROCEDURE — 36415 COLL VENOUS BLD VENIPUNCTURE: CPT | Performed by: INTERNAL MEDICINE

## 2021-05-21 PROCEDURE — 80061 LIPID PANEL: CPT | Performed by: INTERNAL MEDICINE

## 2021-05-21 PROCEDURE — 82043 UR ALBUMIN QUANTITATIVE: CPT | Performed by: INTERNAL MEDICINE

## 2021-05-21 PROCEDURE — 99214 OFFICE O/P EST MOD 30 MIN: CPT | Performed by: INTERNAL MEDICINE

## 2021-05-21 RX ORDER — LISINOPRIL 40 MG/1
TABLET ORAL
Qty: 90 TABLET | Refills: 3 | Status: SHIPPED | OUTPATIENT
Start: 2021-05-21 | End: 2022-05-25

## 2021-05-21 RX ORDER — OMEPRAZOLE 40 MG/1
CAPSULE, DELAYED RELEASE ORAL
Qty: 90 CAPSULE | Refills: 3 | Status: SHIPPED | OUTPATIENT
Start: 2021-05-21 | End: 2022-05-25

## 2021-05-21 ASSESSMENT — PAIN SCALES - GENERAL: PAINLEVEL: NO PAIN (0)

## 2021-05-21 ASSESSMENT — MIFFLIN-ST. JEOR: SCORE: 1776.08

## 2021-05-21 NOTE — PROGRESS NOTES
"    Assessment & Plan     Benign essential hypertension  Blood pressure is controlled with lisinopril 40 mg a day.  I will refill this, check his labs today.  Blood pressure is at a good level.  - lisinopril (ZESTRIL) 40 MG tablet; TAKE ONE TABLET BY MOUTH ONCE DAILY (PLEASE SCHEDULE APPOINTMENT)  - Comprehensive metabolic panel  - Albumin Random Urine Quantitative with Creat Ratio  - Lipid Profile    Gastroesophageal reflux disease with esophagitis without hemorrhage  Reflux disease still having some phlegm throughout the day, does not smoke or drink.  He can try to cut back on caffeine.  Family history of esophageal cancer we will continue the omeprazole and get him an upper endoscopy.  - GASTROENTEROLOGY ADULT REF PROCEDURE ONLY; Future    Colon cancer screening  Colon cancer in his father we will get a colon cancer screening as it is been 10 years.  - GASTROENTEROLOGY ADULT REF PROCEDURE ONLY; Future    Family history of esophageal cancer  Family history of esophageal cancer needs an EGD.  Continue his PPI.  - GASTROENTEROLOGY ADULT REF PROCEDURE ONLY; Future    Recommended the Covid vaccination for him which he declined today.    History of melanoma, he does follow with dermatology.           BMI:   Estimated body mass index is 31.6 kg/m  as calculated from the following:    Height as of this encounter: 1.753 m (5' 9\").    Weight as of this encounter: 97.1 kg (214 lb).   Weight management plan: Discussed healthy diet and exercise guidelines      Return in about 1 year (around 5/21/2022) for Physical Exam.    Jarvis Bahena MD  [facility] HospitalVINH Beltre is a 60 year old who presents for the following health issues     HPI     Chief Complaint   Patient presents with     Recheck Medication     Htn  GERD     Sees dermatology every 6 months.     Needs colonoscopy for family history and general screening.     Takes omeprazole and lisinopril, still has phlegm and some reflux.  Dad had " "esophageal cancer.    Uses caffeine, no tobacco no alcohol.     Past Medical History:   Diagnosis Date     Hypertension      Malignant melanoma (H)      Squamous cell carcinoma of skin, unspecified      Current Outpatient Medications   Medication     lisinopril (ZESTRIL) 40 MG tablet     omeprazole (PRILOSEC) 40 MG DR capsule     No current facility-administered medications for this visit.      Social History     Tobacco Use     Smoking status: Never Smoker     Smokeless tobacco: Never Used   Substance Use Topics     Alcohol use: Not Currently     Comment: very rare     Drug use: No           Review of Systems         Objective    /84   Pulse 90   Temp 98.1  F (36.7  C) (Temporal)   Resp 16   Ht 1.753 m (5' 9\")   Wt 97.1 kg (214 lb)   SpO2 98%   BMI 31.60 kg/m    Body mass index is 31.6 kg/m .  Physical Exam                   "

## 2021-05-24 ENCOUNTER — TELEPHONE (OUTPATIENT)
Dept: INTERNAL MEDICINE | Facility: CLINIC | Age: 61
End: 2021-05-24

## 2021-05-24 DIAGNOSIS — Z11.59 ENCOUNTER FOR SCREENING FOR OTHER VIRAL DISEASES: ICD-10-CM

## 2021-05-24 NOTE — TELEPHONE ENCOUNTER
Date of procedure: 6/3  Colonoscopy  Surgeon: Dr. Davis  Prep:Miralax  Packet:Colonoscopy/EGD instructions were sent to the patient in [facility].   Date: 5/24/2021      Surgery Scheduler

## 2021-05-24 NOTE — LETTER

## 2021-05-26 NOTE — TELEPHONE ENCOUNTER
Patient called to reschedule to 6/25. He said that he still has instructions. Covid test changed.

## 2021-05-31 DIAGNOSIS — Z11.59 ENCOUNTER FOR SCREENING FOR OTHER VIRAL DISEASES: ICD-10-CM

## 2021-06-22 DIAGNOSIS — Z11.59 ENCOUNTER FOR SCREENING FOR OTHER VIRAL DISEASES: ICD-10-CM

## 2021-06-22 LAB
LABORATORY COMMENT REPORT: NORMAL
SARS-COV-2 RNA RESP QL NAA+PROBE: NEGATIVE
SARS-COV-2 RNA RESP QL NAA+PROBE: NORMAL
SPECIMEN SOURCE: NORMAL
SPECIMEN SOURCE: NORMAL

## 2021-06-22 PROCEDURE — U0003 INFECTIOUS AGENT DETECTION BY NUCLEIC ACID (DNA OR RNA); SEVERE ACUTE RESPIRATORY SYNDROME CORONAVIRUS 2 (SARS-COV-2) (CORONAVIRUS DISEASE [COVID-19]), AMPLIFIED PROBE TECHNIQUE, MAKING USE OF HIGH THROUGHPUT TECHNOLOGIES AS DESCRIBED BY CMS-2020-01-R: HCPCS | Performed by: SURGERY

## 2021-06-22 PROCEDURE — U0005 INFEC AGEN DETEC AMPLI PROBE: HCPCS | Performed by: SURGERY

## 2021-06-25 ENCOUNTER — HOSPITAL ENCOUNTER (OUTPATIENT)
Facility: CLINIC | Age: 61
Discharge: HOME | End: 2021-06-25
Attending: SURGERY | Admitting: SURGERY
Payer: COMMERCIAL

## 2021-06-25 VITALS
RESPIRATION RATE: 10 BRPM | OXYGEN SATURATION: 93 % | DIASTOLIC BLOOD PRESSURE: 77 MMHG | HEART RATE: 87 BPM | TEMPERATURE: 98 F | SYSTOLIC BLOOD PRESSURE: 114 MMHG

## 2021-06-25 LAB
COLONOSCOPY: NORMAL
UPPER GI ENDOSCOPY: NORMAL

## 2021-06-25 PROCEDURE — 45380 COLONOSCOPY AND BIOPSY: CPT | Mod: PT | Performed by: SURGERY

## 2021-06-25 PROCEDURE — 99153 MOD SED SAME PHYS/QHP EA: CPT | Performed by: SURGERY

## 2021-06-25 PROCEDURE — 88305 TISSUE EXAM BY PATHOLOGIST: CPT | Mod: TC | Performed by: SURGERY

## 2021-06-25 PROCEDURE — 45378 DIAGNOSTIC COLONOSCOPY: CPT | Performed by: SURGERY

## 2021-06-25 PROCEDURE — 43235 EGD DIAGNOSTIC BRUSH WASH: CPT | Mod: 51 | Performed by: SURGERY

## 2021-06-25 PROCEDURE — G0500 MOD SEDAT ENDO SERVICE >5YRS: HCPCS | Performed by: SURGERY

## 2021-06-25 PROCEDURE — 99152 MOD SED SAME PHYS/QHP 5/>YRS: CPT | Mod: 59 | Performed by: SURGERY

## 2021-06-25 PROCEDURE — 250N000011 HC RX IP 250 OP 636: Performed by: SURGERY

## 2021-06-25 PROCEDURE — 250N000009 HC RX 250: Performed by: SURGERY

## 2021-06-25 PROCEDURE — 88305 TISSUE EXAM BY PATHOLOGIST: CPT | Mod: 26 | Performed by: PATHOLOGY

## 2021-06-25 PROCEDURE — 43235 EGD DIAGNOSTIC BRUSH WASH: CPT | Performed by: SURGERY

## 2021-06-25 RX ORDER — LIDOCAINE 40 MG/G
CREAM TOPICAL
Status: DISCONTINUED | OUTPATIENT
Start: 2021-06-25 | End: 2021-06-25 | Stop reason: HOSPADM

## 2021-06-25 RX ORDER — FENTANYL CITRATE 50 UG/ML
INJECTION, SOLUTION INTRAMUSCULAR; INTRAVENOUS PRN
Status: DISCONTINUED | OUTPATIENT
Start: 2021-06-25 | End: 2021-06-25 | Stop reason: HOSPADM

## 2021-06-25 RX ADMIN — LIDOCAINE HYDROCHLORIDE 0.1 ML: 10 INJECTION, SOLUTION EPIDURAL; INFILTRATION; INTRACAUDAL; PERINEURAL at 10:06

## 2021-06-25 NOTE — DISCHARGE INSTRUCTIONS
[facility]    Home Care Following Endoscopy          Activity:    You have just undergone an endoscopic procedure usually performed with conscious sedation.  Do not work or operate machinery (including a car) for at least 12 hours.      I encourage you to walk and attempt to pass this air as soon as possible.    Diet:    Return to the diet you were on before your procedure but eat lightly for the first 12-24 hours.    Drink plenty of water.    Resume any regular medications unless otherwise advised by your physician.  Please begin any new medication prescribed as a result of your procedure as directed by your physician.     If you had any biopsy or polyp removed please refrain from aspirin or aspirin products for 2 days.  If on Coumadin please restart as instructed by your physician.   Pain:    You may take Tylenol as needed for pain.  Expected Recovery:    You can expect some mild abdominal fullness and/or discomfort due to the air used to inflate your intestinal tract. It is also normal to have a mild sore throat after upper endoscopy.    Call Your Physician if You Have:    After Upper Endoscopy:  o Shoulder, back or chest pain.  o Difficulty breathing or swallowing.  o Vomiting blood.    After Colonoscopy:  o Worsening persisting abdominal pain which is worse with activity.  o Fevers (>101 degrees F), chills or shakes.  o Passage of continued blood with bowel movements.   Any questions or concerns about your recovery, please call [phone] or after hours [phone] Nurse Advice Line.

## 2021-06-25 NOTE — H&P
[facility]    Pre-Endoscopy History and Physical     Alexsander Sin MRN# [id number]   YOB: 1960 Age: 60 year old     Date of Procedure: 6/25/2021  Primary care provider: Jarvis Bahena  Type of Endoscopy: Colonoscopy with possible biopsy, possible polypectomy and Esophagogastroduodenoscopy with possible biopsy, possible dilation, possible foreign body removal  Reason for Procedure: GERD, screening  Type of Anesthesia Anticipated: Conscious Sedation    HPI:    Alexsander is a 60 year old male who will be undergoing the above procedure.  famhx of esophageal cancer; GERD; 1st screening colonoscopy    A history and physical has been performed. The patient's medications and allergies have been reviewed. The risks and benefits of the procedure and the sedation options and risks were discussed with the patient.  All questions were answered and informed consent was obtained.      He denies a personal or family history of anesthesia complications or bleeding disorders.     Patient Active Problem List   Diagnosis     Benign essential hypertension     Melanoma in situ of back (H)        Past Medical History:   Diagnosis Date     Hypertension      Malignant melanoma (H)      Squamous cell carcinoma of skin, unspecified         Past Surgical History:   Procedure Laterality Date     HERNIA REPAIR       ORTHOPEDIC SURGERY         Social History     Tobacco Use     Smoking status: Never Smoker     Smokeless tobacco: Never Used   Substance Use Topics     Alcohol use: Not Currently     Comment: very rare       Family History   Problem Relation Age of Onset     Hypertension Mother      Lung Cancer Mother      Diabetes Father         prediabetes     Esophageal Cancer Father      Dementia Maternal Grandmother         in 90's     Myocardial Infarction Maternal Grandfather         before 60     Breast Cancer Paternal Grandmother      Stomach Cancer Paternal Grandfather        Prior to Admission  "medications    Medication Sig Start Date End Date Taking? Authorizing Provider   lisinopril (ZESTRIL) 40 MG tablet TAKE ONE TABLET BY MOUTH ONCE DAILY (PLEASE SCHEDULE APPOINTMENT) 5/21/21  Yes Jarvis Bahena MD   omeprazole (PRILOSEC) 40 MG DR capsule TAKE ONE CAPSULE BY MOUTH ONCE DAILY 5/21/21  Yes Jarvis Bahena MD       Allergies   Allergen Reactions     Hydrocodone-Acetaminophen Unknown     Sulfa Drugs Hives        REVIEW OF SYSTEMS:   5 point ROS negative except as noted above in HPI, including Gen., Resp., CV, GI &  system review.    PHYSICAL EXAM:   /77   Pulse 68   Temp 98  F (36.7  C) (Oral)   Resp 16   SpO2 97%  Estimated body mass index is 31.6 kg/m  as calculated from the following:    Height as of 5/21/21: 1.753 m (5' 9\").    Weight as of 5/21/21: 97.1 kg (214 lb).   Constitutional: Awake, alert, no acute distress.  Eyes: No scleral icterus.  Conjunctiva are without injection.  ENMT: Mucous membranes moist, dentition and gums are intact.   Neck: Soft, supple, trachea midline.    Endocrine: n/a   Lymphatic: There is no cervical, submandibularadenopathy.  Respiratory: Lungs are clear to auscultation and percussion bilaterally.   Cardiovascular: Regular rate and rhythm. No murmurs, rubs, or gallops.    Abdomen: Non-distended, non-tender, normoactive bowel sounds present, No masses,  Musculoskeletal: No spinal or CVA tenderness. Full range of motion in the upper and lower extremities.    Skin: No skin rashes or lesions to inspection.  No petechia.    Neurologic: Cranial nerves II through XII are grossly intact and symmetric.  Psychiatric: The patient is alert and oriented times 3.  The patient's affect is not blunted and mood is appropriate.  DIAGNOSTICS:    Not indicated    IMPRESSION   ASA Class 2 - Mild systemic disease    PLAN:   Plan for Colonoscopy with possible biopsy, possible polypectomy and Esophagogastroduodenoscopy with possible biopsy, possible dilation, possible foreign body " removal. We discussed the risks, benefits and alternatives and the patient wished to proceed.  Patient is cleared for the above procedure.    The above has been forwarded to the consulting provider.    [facility], [facility]

## 2021-06-25 NOTE — LETTER
Alexsander Sin  [address] [address] [place] [id number]      July 6, 2021    Dear Alexsander,  This letter is written to inform you of the results of your recent colonoscopy.  Your examination showed polyp(s) in your rectum. All polyps were removed in their entirety and sent for review by a pathologist. As you will see on the pathology report below, the tissue(s) was normal mucosa. Your examination was otherwise without abnormality.    SPECIMEN(S):   Rectal polyp     FINAL DIAGNOSIS:   Rectal polyp:   - Colonic-type mucosa with no diagnostic alterations.       Given these findings,  I recommend that you undergo a repeat colonoscopy in ten years for screening. We will enter you into a recall system so you receive a reminder closer to the time that you are due for repeat examination.     Please remember that this recommendation is made with the understanding that you are not experiencing persistent changes in bowel function, bleeding per rectum, and/or significant abdominal pain. If you experience these symptoms, please contact your primary care provider for a further evaluation.     If you have any questions or concerns about the results of your colonoscopy or the appropriate follow-up, please contact my assistant at [phone]    Sincerely,        [facility],   [facility]  ___

## 2021-06-29 LAB — COPATH REPORT: NORMAL

## 2021-08-10 ENCOUNTER — OFFICE VISIT (OUTPATIENT)
Dept: DERMATOLOGY | Facility: CLINIC | Age: 61
End: 2021-08-10
Payer: COMMERCIAL

## 2021-08-10 VITALS — HEART RATE: 80 BPM | SYSTOLIC BLOOD PRESSURE: 109 MMHG | OXYGEN SATURATION: 96 % | DIASTOLIC BLOOD PRESSURE: 69 MMHG

## 2021-08-10 DIAGNOSIS — D23.9 DERMAL NEVUS: ICD-10-CM

## 2021-08-10 DIAGNOSIS — D18.01 ANGIOMA OF SKIN: ICD-10-CM

## 2021-08-10 DIAGNOSIS — Z86.006 HISTORY OF MELANOMA IN SITU: ICD-10-CM

## 2021-08-10 DIAGNOSIS — L81.4 LENTIGO: Primary | ICD-10-CM

## 2021-08-10 DIAGNOSIS — D22.9 NEVUS: ICD-10-CM

## 2021-08-10 DIAGNOSIS — L82.1 SEBORRHEIC KERATOSIS: ICD-10-CM

## 2021-08-10 PROCEDURE — 99213 OFFICE O/P EST LOW 20 MIN: CPT | Performed by: DERMATOLOGY

## 2021-08-10 NOTE — LETTER
8/10/2021         RE: Alexsander Sin  [address] [facility] [id number]        Dear Colleague,    Thank you for referring your patient, Alexsander Sin, to the [facility]. Please see a copy of my visit note below.    Alexsander Sin is an extremely pleasant 60 year old year old male patient here today for h xo fmelanoma in situ. He notes spot on right cheek.   .   Patient states this has been present for a while.  Patient reports the following symptoms:  none.  Patient reports the following previous treatments none.  These treatments did not work.  Patient reports the following modifying factors none.  Associated symptoms: none.  Patient has no other skin complaints today.  Remainder of the HPI, Meds, PMH, Allergies, FH, and SH was reviewed in chart.      Past Medical History:   Diagnosis Date     Hypertension      Malignant melanoma (H)      Squamous cell carcinoma of skin, unspecified        Past Surgical History:   Procedure Laterality Date     COLONOSCOPY N/A 6/25/2021    Procedure: Colonoscopy;  Surgeon: Jessica Davis MD;  Location:  GI     ESOPHAGOSCOPY, GASTROSCOPY, DUODENOSCOPY (EGD), COMBINED N/A 6/25/2021    Procedure: Esophagogastroduodenoscopy;  Surgeon: Jessica Davis MD;  Location:  GI     HERNIA REPAIR       ORTHOPEDIC SURGERY          Family History   Problem Relation Age of Onset     Hypertension Mother      Lung Cancer Mother      Diabetes Father         prediabetes     Esophageal Cancer Father      Dementia Maternal Grandmother         in 90's     Myocardial Infarction Maternal Grandfather         before 60     Breast Cancer Paternal Grandmother      Stomach Cancer Paternal Grandfather        Social History     Socioeconomic History     Marital status:      Spouse name: Not on file     Number of children: Not on file     Years of education: Not on file     Highest education level: Not on file   Occupational History     Not on file   Tobacco Use      Smoking status: Never Smoker     Smokeless tobacco: Never Used   Substance and Sexual Activity     Alcohol use: Not Currently     Comment: very rare     Drug use: No     Sexual activity: Yes     Partners: Female   Other Topics Concern     Parent/sibling w/ CABG, MI or angioplasty before 65F 55M? No   Social History Narrative     Not on file     Social Determinants of Health     Financial Resource Strain:      Difficulty of Paying Living Expenses:    Food Insecurity:      Worried About Running Out of Food in the Last Year:      Ran Out of Food in the Last Year:    Transportation Needs:      Lack of Transportation (Medical):      Lack of Transportation (Non-Medical):    Physical Activity:      Days of Exercise per Week:      Minutes of Exercise per Session:    Stress:      Feeling of Stress :    Social Connections:      Frequency of Communication with Friends and Family:      Frequency of Social Gatherings with Friends and Family:      Attends [employer] Services:      Active Member of Clubs or Organizations:      Attends Club or Organization Meetings:      Marital Status:    Intimate Partner Violence:      Fear of Current or Ex-Partner:      Emotionally Abused:      Physically Abused:      Sexually Abused:        Outpatient Encounter Medications as of 8/10/2021   Medication Sig Dispense Refill     lisinopril (ZESTRIL) 40 MG tablet TAKE ONE TABLET BY MOUTH ONCE DAILY (PLEASE SCHEDULE APPOINTMENT) 90 tablet 3     omeprazole (PRILOSEC) 40 MG DR capsule TAKE ONE CAPSULE BY MOUTH ONCE DAILY 90 capsule 3     No facility-administered encounter medications on file as of 8/10/2021.             O:   NAD, WDWN, Alert & Oriented, Mood & Affect wnl, Vitals stable   Here today alone   /69 (BP Location: Left arm, Patient Position: Sitting)   Pulse 80   SpO2 96%    General appearance normal   Vitals stable   Alert, oriented and in no acute distress      Following lymph nodes palpated: Occipital, Cervical, Supraclavicular ,  inguinal no lad   R back well h ealed  r cheek brown papule    pigmented macules on trunk and ext with regular b orders and pigemnt networks    Stuck on papules and brown macules on trunk and ext   Red papules on trunk  Flesh colored papules on trunk     The remainder of the full exam was normal; the following areas were examined:  conjunctiva/lids, oral mucosa, neck, peripheral vascular system, abdomen, lymph nodes, digits/nails, eccrine and apocrine glands, scalp/hair, face, neck, chest, abdomen, buttocks, back, RUE, LUE, RLE, LLE       Eyes: Conjunctivae/lids:Normal     ENT: Lips, buccal mucosa, tongue: normal    MSK:Normal    Cardiovascular: peripheral edema none    Pulm: Breathing Normal    Lymph Nodes: No Head and Neck Lymphadenopathy     Neuro/Psych: Orientation:Alert and Orientedx3 ; Mood/Affect:normal       A/P:  1. Seborrheic keratosis, lentigo, angioma, dermal nevus, nevi, hx of melanoma in situ   It was a pleasure speaking to Alexsander Sin today.  Previous clinic notes and pertinent laboratory tests were reviewed prior to Alexsander Sin's visit.    Nature and genetics of benign skin lesions dicussed with patient.  Signs and Symptoms of skin cancer discussed with patient.  Patient encouraged to perform monthly skin exams.  UV precautions reviewed with patient.  Return to clinic 6 months        Again, thank you for allowing me to participate in the care of your patient.        Sincerely,        Bobo Lopez MD

## 2021-08-10 NOTE — NURSING NOTE
"Initial /69 (BP Location: Left arm, Patient Position: Sitting)   Pulse 80   SpO2 96%  Estimated body mass index is 31.6 kg/m  as calculated from the following:    Height as of 5/21/21: 1.753 m (5' 9\").    Weight as of 5/21/21: 97.1 kg (214 lb). .      "

## 2021-08-10 NOTE — PROGRESS NOTES
Alexsander Sin is an extremely pleasant 60 year old year old male patient here today for h xo fmelanoma in situ. He notes spot on right cheek.   .   Patient states this has been present for a while.  Patient reports the following symptoms:  none.  Patient reports the following previous treatments none.  These treatments did not work.  Patient reports the following modifying factors none.  Associated symptoms: none.  Patient has no other skin complaints today.  Remainder of the HPI, Meds, PMH, Allergies, FH, and SH was reviewed in chart.      Past Medical History:   Diagnosis Date     Hypertension      Malignant melanoma (H)      Squamous cell carcinoma of skin, unspecified        Past Surgical History:   Procedure Laterality Date     COLONOSCOPY N/A 6/25/2021    Procedure: Colonoscopy;  Surgeon: Jessica Davis MD;  Location:  GI     ESOPHAGOSCOPY, GASTROSCOPY, DUODENOSCOPY (EGD), COMBINED N/A 6/25/2021    Procedure: Esophagogastroduodenoscopy;  Surgeon: Jessica aDvis MD;  Location:  GI     HERNIA REPAIR       ORTHOPEDIC SURGERY          Family History   Problem Relation Age of Onset     Hypertension Mother      Lung Cancer Mother      Diabetes Father         prediabetes     Esophageal Cancer Father      Dementia Maternal Grandmother         in 90's     Myocardial Infarction Maternal Grandfather         before 60     Breast Cancer Paternal Grandmother      Stomach Cancer Paternal Grandfather        Social History     Socioeconomic History     Marital status:      Spouse name: Not on file     Number of children: Not on file     Years of education: Not on file     Highest education level: Not on file   Occupational History     Not on file   Tobacco Use     Smoking status: Never Smoker     Smokeless tobacco: Never Used   Substance and Sexual Activity     Alcohol use: Not Currently     Comment: very rare     Drug use: No     Sexual activity: Yes     Partners: Female   Other Topics Concern     Parent/sibling  w/ CABG, MI or angioplasty before 65F 55M? No   Social History Narrative     Not on file     Social Determinants of Health     Financial Resource Strain:      Difficulty of Paying Living Expenses:    Food Insecurity:      Worried About Running Out of Food in the Last Year:      Ran Out of Food in the Last Year:    Transportation Needs:      Lack of Transportation (Medical):      Lack of Transportation (Non-Medical):    Physical Activity:      Days of Exercise per Week:      Minutes of Exercise per Session:    Stress:      Feeling of Stress :    Social Connections:      Frequency of Communication with Friends and Family:      Frequency of Social Gatherings with Friends and Family:      Attends [employer] Services:      Active Member of Clubs or Organizations:      Attends Club or Organization Meetings:      Marital Status:    Intimate Partner Violence:      Fear of Current or Ex-Partner:      Emotionally Abused:      Physically Abused:      Sexually Abused:        Outpatient Encounter Medications as of 8/10/2021   Medication Sig Dispense Refill     lisinopril (ZESTRIL) 40 MG tablet TAKE ONE TABLET BY MOUTH ONCE DAILY (PLEASE SCHEDULE APPOINTMENT) 90 tablet 3     omeprazole (PRILOSEC) 40 MG DR capsule TAKE ONE CAPSULE BY MOUTH ONCE DAILY 90 capsule 3     No facility-administered encounter medications on file as of 8/10/2021.             O:   NAD, WDWN, Alert & Oriented, Mood & Affect wnl, Vitals stable   Here today alone   /69 (BP Location: Left arm, Patient Position: Sitting)   Pulse 80   SpO2 96%    General appearance normal   Vitals stable   Alert, oriented and in no acute distress      Following lymph nodes palpated: Occipital, Cervical, Supraclavicular , inguinal no lad   R back well h ealed  r cheek brown papule    pigmented macules on trunk and ext with regular b orders and pigemnt networks    Stuck on papules and brown macules on trunk and ext   Red papules on trunk  Flesh colored papules on  trunk     The remainder of the full exam was normal; the following areas were examined:  conjunctiva/lids, oral mucosa, neck, peripheral vascular system, abdomen, lymph nodes, digits/nails, eccrine and apocrine glands, scalp/hair, face, neck, chest, abdomen, buttocks, back, RUE, LUE, RLE, LLE       Eyes: Conjunctivae/lids:Normal     ENT: Lips, buccal mucosa, tongue: normal    MSK:Normal    Cardiovascular: peripheral edema none    Pulm: Breathing Normal    Lymph Nodes: No Head and Neck Lymphadenopathy     Neuro/Psych: Orientation:Alert and Orientedx3 ; Mood/Affect:normal       A/P:  1. Seborrheic keratosis, lentigo, angioma, dermal nevus, nevi, hx of melanoma in situ   It was a pleasure speaking to Alexsander Sin today.  Previous clinic notes and pertinent laboratory tests were reviewed prior to Alexsander Sin's visit.    Nature and genetics of benign skin lesions dicussed with patient.  Signs and Symptoms of skin cancer discussed with patient.  Patient encouraged to perform monthly skin exams.  UV precautions reviewed with patient.  Return to clinic 6 months

## 2021-09-19 ENCOUNTER — HEALTH MAINTENANCE LETTER (OUTPATIENT)
Age: 61
End: 2021-09-19

## 2021-10-26 NOTE — PROGRESS NOTES
Assessment & Plan     Lower resp. tract infection  Declined testing for COVID.  Discussed he needs isolation for 10 days from onset and needs to be 72 hours significantly improved.    At this time concerned with Low O2 values at home, exam relatively benign other than decreased breath sounds, x-ray showed no signs of obvious consolidation or pneumothorax.  His vitals are stable.  Elected to start him on Albuterol and antibiotics at this time for LRTI.  He will start prednisone tomorrow morning with food in stomach.  He can use Cheratussin at night - notes he has tolerated codeine in past.  We discussed risks and side effects of all the medications and how to properly administer.  Encouraged COVID vaccination in the future. Advised evaluation immediately if O2s are in low 90s again despite these medications.   - XR Chest 2 Views; Future  - guaiFENesin-codeine (ROBITUSSIN AC) 100-10 MG/5ML solution; Take 5-10 mLs by mouth At Bedtime  - albuterol (PROAIR HFA/PROVENTIL HFA/VENTOLIN HFA) 108 (90 Base) MCG/ACT inhaler; Inhale 2 puffs into the lungs every 4 hours as needed for shortness of breath / dyspnea or wheezing  - predniSONE (DELTASONE) 20 MG tablet; Take 2 tablets (40 mg) by mouth daily for 5 days  - doxycycline monohydrate (MONODOX) 100 MG capsule; Take 1 capsule (100 mg) by mouth 2 times daily for 10 days    Acute pain of left shoulder  This is an ongoing issue, does not seem to be in relation to heart or current illness. Referral for Ortho placed.   - Orthopedic  Referral; Future        Return in about 1 week (around 11/3/2021) for If not improving, sooner if worse or new concerns.     Options for treatment and follow-up care were reviewed with the patient and/or guardian. Patient and/or guardian engaged in the decision making process and verbalized understanding of the options discussed and agreed with the final plan.     Josie Owens PA-C  [facility]  is a 61 year old who presents for the following health issues     HPI     Acute Illness  Acute illness concerns: cough, possible GERD flare up?   Onset/Duration: last week, Tuesday.   Symptoms:  Fever: no  Chills/Sweats: no  Headache (location?): no  Sinus Pressure: no - tooth extracted when this started.   Conjunctivitis:  no  Ear Pain: no  Rhinorrhea: YES  Congestion: YES  Sore Throat: YES- scratchy from coughing.   Cough: YES-productive of green sputum when he starts coughing then white.   Wheeze: YES- more so in throat then in lungs. Having shortness of breath.   Decreased Appetite: no - eating less. Aggravated when he eats other than eating toast, crackers or anything light.   Nausea: no  Vomiting: no  Diarrhea: no  Dysuria/Freq.: no  Rashes: no  Loss of taste/smell: no  Dysuria or Hematuria: no  Fatigue/Achiness: YES- fatigue, gets really tired. Left shoulder pain has been going on for awhile. Has a pain that radiates down his harm to his elbow and will get tingling and loss of feeling in hand.   Sick/Strep Exposure: YES- wife had pneumonia - she has recovered.   Therapies tried and outcome: motrin for tooth extraction he has stopped this. He has been taking less dosing of ibuprofen and acetaminophen. Started taking liquid antacid - generic mylax. Has been using a spare inhaler that his wife had - this helps him. Takes omeprazole daily for GERD/acid reflux.     Coughing a lot at night.   He notes O2 has gotten to lower 90s so he used wife's albuterol and it came back up.   No chest pain.     Has had left shoulder pain going on for a while, top back of the shoulder, will radiate down to the elbow and occasionally will get the burning numb feeling throughout the entire hand.  This is not related to recent illness.     Review of Systems   Constitutional, HEENT, cardiovascular, pulmonary, gi and gu systems are negative, except as otherwise noted.      Objective    /82   Pulse 85   Temp 98  F (36.7  C)  (Temporal)   SpO2 96%   There is no height or weight on file to calculate BMI.  Physical Exam   GENERAL: healthy, alert and no distress  EYES: Eyes grossly normal to inspection, PERRL and conjunctivae and sclerae normal  HENT: ear canals and TM's normal, nose and mouth without ulcers or lesions  NECK: no adenopathy, no asymmetry, masses, or scars and thyroid normal to palpation  RESP: Clear to auscultation just decreased breath sounds throughout.   CV: regular rate and rhythm, normal S1 S2, no S3 or S4, no murmur, click or rub, no peripheral edema and peripheral pulses strong  MS: no gross musculoskeletal defects noted, no edema    Results for orders placed or performed in visit on 10/27/21   XR Chest 2 Views     Status: None (Preliminary result)    Narrative    CHEST TWO VIEWS    10/27/2021 8:08 AM     HISTORY: Lower respiratory tract infection.    COMPARISON: None.    FINDINGS:  The lungs are clear. No pleural effusions or pneumothorax.  Heart size and pulmonary vascularity are within normal limits. No  acute fracture.      Impression    IMPRESSION: No evidence of acute cardiopulmonary disease is seen.

## 2021-10-27 ENCOUNTER — ANCILLARY PROCEDURE (OUTPATIENT)
Dept: GENERAL RADIOLOGY | Facility: OTHER | Age: 61
End: 2021-10-27
Attending: PHYSICIAN ASSISTANT
Payer: COMMERCIAL

## 2021-10-27 ENCOUNTER — OFFICE VISIT (OUTPATIENT)
Dept: FAMILY MEDICINE | Facility: OTHER | Age: 61
End: 2021-10-27
Payer: COMMERCIAL

## 2021-10-27 VITALS
SYSTOLIC BLOOD PRESSURE: 136 MMHG | OXYGEN SATURATION: 96 % | DIASTOLIC BLOOD PRESSURE: 82 MMHG | TEMPERATURE: 98 F | HEART RATE: 85 BPM

## 2021-10-27 DIAGNOSIS — J22 LOWER RESP. TRACT INFECTION: Primary | ICD-10-CM

## 2021-10-27 DIAGNOSIS — M25.512 ACUTE PAIN OF LEFT SHOULDER: ICD-10-CM

## 2021-10-27 DIAGNOSIS — J22 LOWER RESP. TRACT INFECTION: ICD-10-CM

## 2021-10-27 PROCEDURE — 99214 OFFICE O/P EST MOD 30 MIN: CPT | Performed by: PHYSICIAN ASSISTANT

## 2021-10-27 PROCEDURE — 71046 X-RAY EXAM CHEST 2 VIEWS: CPT | Performed by: RADIOLOGY

## 2021-10-27 RX ORDER — ALBUTEROL SULFATE 90 UG/1
2 AEROSOL, METERED RESPIRATORY (INHALATION) EVERY 4 HOURS PRN
Qty: 18 G | Refills: 0 | Status: SHIPPED | OUTPATIENT
Start: 2021-10-27 | End: 2022-11-14

## 2021-10-27 RX ORDER — DOXYCYCLINE 100 MG/1
100 CAPSULE ORAL 2 TIMES DAILY
Qty: 20 CAPSULE | Refills: 0 | Status: SHIPPED | OUTPATIENT
Start: 2021-10-27 | End: 2021-11-06

## 2021-10-27 RX ORDER — PREDNISONE 20 MG/1
40 TABLET ORAL DAILY
Qty: 10 TABLET | Refills: 0 | Status: SHIPPED | OUTPATIENT
Start: 2021-10-27 | End: 2022-11-14

## 2021-10-27 RX ORDER — CODEINE PHOSPHATE AND GUAIFENESIN 10; 100 MG/5ML; MG/5ML
1-2 SOLUTION ORAL AT BEDTIME
Qty: 100 ML | Refills: 0 | Status: SHIPPED | OUTPATIENT
Start: 2021-10-27 | End: 2022-11-14

## 2021-10-27 ASSESSMENT — PAIN SCALES - GENERAL: PAINLEVEL: EXTREME PAIN (8)

## 2021-11-08 NOTE — PROGRESS NOTES
Sports Medicine Clinic Visit    PCP: Jarvis Bahena    CC: Patient presents with:  Left Shoulder - Pain      HPI:  Alexsander Sin is a 61 year old male who is seen in consultation at the request of Josie Owens PA-C.  He notes left arm and neck pain for the past 4 months.  He notes pain over the left neck, into his superior shoulder that travels down his lateral arm into his 3-5 digits. He rates the pain at a  8/10 at its worst and a 2/10 currently.  Symptoms are relieved with nothing. Symptoms are worsened by holding his arm into abduction, working on the computer and driving. He endorses numbness, tingling and weakness.   He denies popping, grinding and catching.  Other treatment has included cold compresses and ibuprofen. He denies past pain in the shoulder and neck but has had lateral epicondylitis.  He recently got off of prednisone for an unrelated issue (prescribed by Josie Owens PA-C) and he is not sure if it was helpful for his above symptoms.      He is currently not working.    History reviewed. No pertinent past surgical/medical/family/social history other than as mentioned in HPI.  Review of systems negative except per HPI.      Patient Active Problem List   Diagnosis     Benign essential hypertension     Melanoma in situ of back (H)     Past Medical History:   Diagnosis Date     Hypertension      Malignant melanoma (H)      Squamous cell carcinoma of skin, unspecified      Past Surgical History:   Procedure Laterality Date     COLONOSCOPY N/A 6/25/2021    Procedure: Colonoscopy;  Surgeon: Jessica Davis MD;  Location:  GI     ESOPHAGOSCOPY, GASTROSCOPY, DUODENOSCOPY (EGD), COMBINED N/A 6/25/2021    Procedure: Esophagogastroduodenoscopy;  Surgeon: Jessica Davis MD;  Location:  GI     HERNIA REPAIR       ORTHOPEDIC SURGERY       Family History   Problem Relation Age of Onset     Hypertension Mother      Lung Cancer Mother      Diabetes Father         prediabetes     Esophageal Cancer Father   "    Dementia Maternal Grandmother         in 90's     Myocardial Infarction Maternal Grandfather         before 60     Breast Cancer Paternal Grandmother      Stomach Cancer Paternal Grandfather      Social History     Socioeconomic History     Marital status:      Spouse name: Not on file     Number of children: Not on file     Years of education: Not on file     Highest education level: Not on file   Occupational History     Not on file   Tobacco Use     Smoking status: Never Smoker     Smokeless tobacco: Never Used   Vaping Use     Vaping Use: Never used   Substance and Sexual Activity     Alcohol use: Not Currently     Comment: very rare     Drug use: No     Sexual activity: Yes     Partners: Female   Other Topics Concern     Parent/sibling w/ CABG, MI or angioplasty before 65F 55M? No   Social History Narrative     Not on file     Social Determinants of Health     Financial Resource Strain: Not on file   Food Insecurity: Not on file   Transportation Needs: Not on file   Physical Activity: Not on file   Stress: Not on file   Social Connections: Not on file   Intimate Partner Violence: Not on file   Housing Stability: Not on file         Current Outpatient Medications   Medication     lisinopril (ZESTRIL) 40 MG tablet     omeprazole (PRILOSEC) 40 MG DR capsule     albuterol (PROAIR HFA/PROVENTIL HFA/VENTOLIN HFA) 108 (90 Base) MCG/ACT inhaler     guaiFENesin-codeine (ROBITUSSIN AC) 100-10 MG/5ML solution     No current facility-administered medications for this visit.     Allergies   Allergen Reactions     Hydrocodone-Acetaminophen Unknown     Sulfa Drugs Hives         Objective:  /78   Ht 1.778 m (5' 10\")   Wt 97.1 kg (214 lb)   BMI 30.71 kg/m      General: Alert and in no distress    Head: Normocephalic, atraumatic  Eyes: no scleral icterus or conjunctival erythema   Skin: no erythema, petechiae, or jaundice  CV: regular rhythm by palpation, 2+ distal pulses  Resp: normal respiratory effort " without conversational dyspnea   Psych: normal mood and affect    Gait: Non-antalgic, appropriate coordination and balance   Neuro: Motor strength and sensation as noted below    Musculoskeletal:    Bilateral Shoulder and Cervical Spine exam    Inspection and Posture:  -No acute deformities    Skin:        no visible deformities    Palpation:  -Tender over the left upper trapezius, posterolateral shoulder, and lateral epicondylitis    Shoulder ROM:        Full active ROM with flexion, extension, abduction, internal and external rotation bilaterally.  Left shoulder abduction and internal rotation behind the back are mildly painful.      Cervical ROM:       Full active cervical flexion, extension, rotation, and lateral flexion.   Cervical flexion, extension, right side rotation, and left lateral flexion are mildly painful.      Strength:        shoulder shrug 5/5 bilaterally       abduction 5/5 bilaterally       flexion 5/5 bilaterally       internal rotation 5/5 bilaterally       external rotation 5/5 bilaterally       elbow flexion 5/5 bilaterally       elbow extension 5/5 bilaterally       forearm pronation 5/5 bilaterally       forearm supination 5/5 bilaterally       wrist flexion 5/5 bilaterally       wrist extension 5/5 bilaterally        strength 5/5 bilaterally       finger abduction 5/5 bilaterally    Sensation:   -Altered sensation to light touch over the left 5th digit      Radiology:  X-rays ordered and independent visualization of images performed and reviewed with Alexsander.    XR LEFT SHOULDER THREE OR MORE VIEWS   11/15/2021 3:14 PM      HISTORY: Acute pain of left shoulder     COMPARISON: None.                                                                      IMPRESSION: Mild acromioclavicular and mild glenohumeral degenerative  changes. No acute fracture. No evidence of dislocation.    XR CERVICAL SPINE 2/3 VWS   11/15/2021 3:15 PM      HISTORY: Acute pain of left shoulder     COMPARISON:  None.     FINDINGS:  The cervical spine is visualized from the craniocervical  junction through the  upper aspect of C7 on the lateral view. The  anterior alignment of the cervical spine is seen from C2 through the  cervical thoracic junction on the swimmer's view. There is disc height  loss at C5-6 and C6-7 with large anterior spondylotic spurs. Mild  anterolisthesis of C4 on C5 measures approximate 0.3 cm and is likely  degenerative in etiology.  Otherwise the vertebral bodies, disc  spaces, alignment, prevertebral soft tissues, odontoid process, and  lateral masses of C1 are otherwise grossly within normal limits. No  evidence for fracture or malalignment is seen. Visualized portions of  the lung apices are clear.                                                                      IMPRESSION:    1. Grade 1 anterolisthesis of C4 on C5 is likely degenerative in  etiology. If there is clinical concern for instability, further  evaluation with lateral flexion-extension views would be recommended.  2. Degenerative disc disease at C5-6 and C6-7 as described above.  3. No acute fracture is identified.  4. Given the patient's history of possible radiculopathy, further  evaluation with MRI may be helpful.     JULIEN PAUL MD     Assessment:  1. Acute pain of left shoulder    2. Cervicalgia    3. Numbness and tingling in left hand    4. Lateral epicondylitis of left elbow        Plan:  Discussed the assessment with the patient and developed a plan together:  -Differential is broad and includes but is not limited to cervical radiculopathy, rotator cuff tendinopathy/impingement, myofascial pain, lateral epicondylitis, and ulnar nerve root impingement.  Likely multifactorial.    -Physical therapy ordered at the [facility].  Please call [phone] to schedule.  Please do 5-6 days of exercises per week between formal sessions and the home exercises they provide.  -Left upper extremity EMG ordered.     -Ice or heat 15-20 minutes as needed (Avoid sleeping on a heating pad or ice)  -Patient's preferred over the counter medications as directed on packaging as needed for pain or soreness.  Please take ibuprofen with food.   -Over the counter lidocaine cream or Voltaren gel as needed.    -Forearm strap as needed for comfort and support.    -Also discussed cervical spine MRI, shoulder steroid injection, and other medications    -Follow up after completion of EMG.  Please schedule at least 1-2 business days after EMG is completed to ensure we have the results of the EMG.      Lyric Handy MD, CAQ Sports Medicine  [facility]

## 2021-11-15 ENCOUNTER — OFFICE VISIT (OUTPATIENT)
Dept: ORTHOPEDICS | Facility: OTHER | Age: 61
End: 2021-11-15
Attending: PHYSICIAN ASSISTANT
Payer: COMMERCIAL

## 2021-11-15 ENCOUNTER — ANCILLARY PROCEDURE (OUTPATIENT)
Dept: GENERAL RADIOLOGY | Facility: OTHER | Age: 61
End: 2021-11-15
Attending: PHYSICAL MEDICINE & REHABILITATION
Payer: COMMERCIAL

## 2021-11-15 VITALS
SYSTOLIC BLOOD PRESSURE: 112 MMHG | WEIGHT: 214 LBS | HEIGHT: 70 IN | BODY MASS INDEX: 30.64 KG/M2 | DIASTOLIC BLOOD PRESSURE: 78 MMHG

## 2021-11-15 DIAGNOSIS — R20.2 NUMBNESS AND TINGLING IN LEFT HAND: ICD-10-CM

## 2021-11-15 DIAGNOSIS — R20.0 NUMBNESS AND TINGLING IN LEFT HAND: ICD-10-CM

## 2021-11-15 DIAGNOSIS — M25.512 ACUTE PAIN OF LEFT SHOULDER: ICD-10-CM

## 2021-11-15 DIAGNOSIS — M77.12 LATERAL EPICONDYLITIS OF LEFT ELBOW: ICD-10-CM

## 2021-11-15 DIAGNOSIS — M54.2 CERVICALGIA: ICD-10-CM

## 2021-11-15 DIAGNOSIS — M25.512 ACUTE PAIN OF LEFT SHOULDER: Primary | ICD-10-CM

## 2021-11-15 PROCEDURE — 72040 X-RAY EXAM NECK SPINE 2-3 VW: CPT | Performed by: RADIOLOGY

## 2021-11-15 PROCEDURE — 73030 X-RAY EXAM OF SHOULDER: CPT | Mod: LT | Performed by: RADIOLOGY

## 2021-11-15 PROCEDURE — 99204 OFFICE O/P NEW MOD 45 MIN: CPT | Performed by: PHYSICAL MEDICINE & REHABILITATION

## 2021-11-15 ASSESSMENT — MIFFLIN-ST. JEOR: SCORE: 1786.95

## 2021-11-15 ASSESSMENT — PAIN SCALES - GENERAL: PAINLEVEL: MILD PAIN (2)

## 2021-11-15 NOTE — LETTER
11/15/2021         RE: Alexsander Sin  [id number] [facility] [address]        Dear Colleague,    Thank you for referring your patient, Alexsander Sin, to the [facility] [place]. Please see a copy of my visit note below.    Sports Medicine Clinic Visit    PCP: Jarvis Bahena    CC: Patient presents with:  Left Shoulder - Pain      HPI:  Alexsander Sin is a 61 year old male who is seen in consultation at the request of Josie Owens PA-C.  He notes left arm and neck pain for the past 4 months.  He notes pain over the left neck, into his superior shoulder that travels down his lateral arm into his 3-5 digits. He rates the pain at a  8/10 at its worst and a 2/10 currently.  Symptoms are relieved with nothing. Symptoms are worsened by holding his arm into abduction, working on the computer and driving. He endorses numbness, tingling and weakness.   He denies popping, grinding and catching.  Other treatment has included cold compresses and ibuprofen. He denies past pain in the shoulder and neck but has had lateral epicondylitis.  He recently got off of prednisone for an unrelated issue (prescribed by Josie Owens PA-C) and he is not sure if it was helpful for his above symptoms.      He is currently not working.    History reviewed. No pertinent past surgical/medical/family/social history other than as mentioned in HPI.  Review of systems negative except per HPI.      Patient Active Problem List   Diagnosis     Benign essential hypertension     Melanoma in situ of back (H)     Past Medical History:   Diagnosis Date     Hypertension      Malignant melanoma (H)      Squamous cell carcinoma of skin, unspecified      Past Surgical History:   Procedure Laterality Date     COLONOSCOPY N/A 6/25/2021    Procedure: Colonoscopy;  Surgeon: Jessica Davis MD;  Location:  GI     ESOPHAGOSCOPY, GASTROSCOPY, DUODENOSCOPY (EGD), COMBINED N/A 6/25/2021    Procedure:  "Esophagogastroduodenoscopy;  Surgeon: Jessica Davis MD;  Location: [facility]     HERNIA REPAIR       ORTHOPEDIC SURGERY       Family History   Problem Relation Age of Onset     Hypertension Mother      Lung Cancer Mother      Diabetes Father         prediabetes     Esophageal Cancer Father      Dementia Maternal Grandmother         in 90's     Myocardial Infarction Maternal Grandfather         before 60     Breast Cancer Paternal Grandmother      Stomach Cancer Paternal Grandfather      Social History     Socioeconomic History     Marital status:      Spouse name: Not on file     Number of children: Not on file     Years of education: Not on file     Highest education level: Not on file   Occupational History     Not on file   Tobacco Use     Smoking status: Never Smoker     Smokeless tobacco: Never Used   Vaping Use     Vaping Use: Never used   Substance and Sexual Activity     Alcohol use: Not Currently     Comment: very rare     Drug use: No     Sexual activity: Yes     Partners: Female   Other Topics Concern     Parent/sibling w/ CABG, MI or angioplasty before 65F 55M? No   Social History Narrative     Not on file     Social Determinants of Health     Financial Resource Strain: Not on file   Food Insecurity: Not on file   Transportation Needs: Not on file   Physical Activity: Not on file   Stress: Not on file   Social Connections: Not on file   Intimate Partner Violence: Not on file   Housing Stability: Not on file         Current Outpatient Medications   Medication     lisinopril (ZESTRIL) 40 MG tablet     omeprazole (PRILOSEC) 40 MG DR capsule     albuterol (PROAIR HFA/PROVENTIL HFA/VENTOLIN HFA) 108 (90 Base) MCG/ACT inhaler     guaiFENesin-codeine (ROBITUSSIN AC) 100-10 MG/5ML solution     No current facility-administered medications for this visit.     Allergies   Allergen Reactions     Hydrocodone-Acetaminophen Unknown     Sulfa Drugs Hives         Objective:  /78   Ht 1.778 m (5' 10\")   Wt " 97.1 kg (214 lb)   BMI 30.71 kg/m      General: Alert and in no distress    Head: Normocephalic, atraumatic  Eyes: no scleral icterus or conjunctival erythema   Skin: no erythema, petechiae, or jaundice  CV: regular rhythm by palpation, 2+ distal pulses  Resp: normal respiratory effort without conversational dyspnea   Psych: normal mood and affect    Gait: Non-antalgic, appropriate coordination and balance   Neuro: Motor strength and sensation as noted below    Musculoskeletal:    Bilateral Shoulder and Cervical Spine exam    Inspection and Posture:  -No acute deformities    Skin:        no visible deformities    Palpation:  -Tender over the left upper trapezius, posterolateral shoulder, and lateral epicondylitis    Shoulder ROM:        Full active ROM with flexion, extension, abduction, internal and external rotation bilaterally.  Left shoulder abduction and internal rotation behind the back are mildly painful.      Cervical ROM:       Full active cervical flexion, extension, rotation, and lateral flexion.   Cervical flexion, extension, right side rotation, and left lateral flexion are mildly painful.      Strength:        shoulder shrug 5/5 bilaterally       abduction 5/5 bilaterally       flexion 5/5 bilaterally       internal rotation 5/5 bilaterally       external rotation 5/5 bilaterally       elbow flexion 5/5 bilaterally       elbow extension 5/5 bilaterally       forearm pronation 5/5 bilaterally       forearm supination 5/5 bilaterally       wrist flexion 5/5 bilaterally       wrist extension 5/5 bilaterally        strength 5/5 bilaterally       finger abduction 5/5 bilaterally    Sensation:   -Altered sensation to light touch over the left 5th digit      Radiology:  X-rays ordered and independent visualization of images performed and reviewed with Alexsander.    XR LEFT SHOULDER THREE OR MORE VIEWS   11/15/2021 3:14 PM      HISTORY: Acute pain of left shoulder     COMPARISON: None.                                                                       IMPRESSION: Mild acromioclavicular and mild glenohumeral degenerative  changes. No acute fracture. No evidence of dislocation.    XR CERVICAL SPINE 2/3 VWS   11/15/2021 3:15 PM      HISTORY: Acute pain of left shoulder     COMPARISON: None.     FINDINGS:  The cervical spine is visualized from the craniocervical  junction through the  upper aspect of C7 on the lateral view. The  anterior alignment of the cervical spine is seen from C2 through the  cervical thoracic junction on the swimmer's view. There is disc height  loss at C5-6 and C6-7 with large anterior spondylotic spurs. Mild  anterolisthesis of C4 on C5 measures approximate 0.3 cm and is likely  degenerative in etiology.  Otherwise the vertebral bodies, disc  spaces, alignment, prevertebral soft tissues, odontoid process, and  lateral masses of C1 are otherwise grossly within normal limits. No  evidence for fracture or malalignment is seen. Visualized portions of  the lung apices are clear.                                                                      IMPRESSION:    1. Grade 1 anterolisthesis of C4 on C5 is likely degenerative in  etiology. If there is clinical concern for instability, further  evaluation with lateral flexion-extension views would be recommended.  2. Degenerative disc disease at C5-6 and C6-7 as described above.  3. No acute fracture is identified.  4. Given the patient's history of possible radiculopathy, further  evaluation with MRI may be helpful.     JULIEN PAUL MD     Assessment:  1. Acute pain of left shoulder    2. Cervicalgia    3. Numbness and tingling in left hand    4. Lateral epicondylitis of left elbow        Plan:  Discussed the assessment with the patient and developed a plan together:  -Differential is broad and includes but is not limited to cervical radiculopathy, rotator cuff tendinopathy/impingement, myofascial pain, lateral epicondylitis, and ulnar nerve root  impingement.  Likely multifactorial.    -Physical therapy ordered at the [facility].  Please call [phone] to schedule.  Please do 5-6 days of exercises per week between formal sessions and the home exercises they provide.  -Left upper extremity EMG ordered.    -Ice or heat 15-20 minutes as needed (Avoid sleeping on a heating pad or ice)  -Patient's preferred over the counter medications as directed on packaging as needed for pain or soreness.  Please take ibuprofen with food.   -Over the counter lidocaine cream or Voltaren gel as needed.    -Forearm strap as needed for comfort and support.    -Also discussed cervical spine MRI, shoulder steroid injection, and other medications    -Follow up after completion of EMG.  Please schedule at least 1-2 business days after EMG is completed to ensure we have the results of the EMG.      Lyric Handy MD, [facility] Sports Medicine  [facility]        Again, thank you for allowing me to participate in the care of your patient.        Sincerely,        Felicitas Handy MD

## 2021-11-15 NOTE — PATIENT INSTRUCTIONS
-Physical therapy ordered at the [facility].  Please call [phone] to schedule.  Please do 5-6 days of exercises per week between formal sessions and the home exercises they provide.  -Left upper extremity EMG ordered.    -Ice or heat 15-20 minutes as needed (Avoid sleeping on a heating pad or ice)  -Patient's preferred over the counter medications as directed on packaging as needed for pain or soreness.  Please take ibuprofen with food.   -Over the counter lidocaine cream or Voltaren gel as needed.    -Forearm strap as needed for comfort and support.    -Also discussed cervical spine MRI, shoulder steroid injection, and other medications    -Follow up after completion of EMG.  Please schedule at least 1-2 business days after EMG is completed to ensure we have the results of the EMG.

## 2021-12-06 ENCOUNTER — THERAPY VISIT (OUTPATIENT)
Dept: PHYSICAL THERAPY | Facility: CLINIC | Age: 61
End: 2021-12-06
Attending: PHYSICAL MEDICINE & REHABILITATION
Payer: COMMERCIAL

## 2021-12-06 DIAGNOSIS — R20.2 NUMBNESS AND TINGLING IN LEFT HAND: ICD-10-CM

## 2021-12-06 DIAGNOSIS — R20.0 NUMBNESS AND TINGLING IN LEFT HAND: ICD-10-CM

## 2021-12-06 DIAGNOSIS — M54.12 CERVICAL RADICULOPATHY: ICD-10-CM

## 2021-12-06 DIAGNOSIS — M54.2 CERVICALGIA: ICD-10-CM

## 2021-12-06 DIAGNOSIS — M25.512 ACUTE PAIN OF LEFT SHOULDER: ICD-10-CM

## 2021-12-06 PROCEDURE — 97012 MECHANICAL TRACTION THERAPY: CPT | Mod: GP | Performed by: PHYSICAL THERAPIST

## 2021-12-06 PROCEDURE — 97140 MANUAL THERAPY 1/> REGIONS: CPT | Mod: GP | Performed by: PHYSICAL THERAPIST

## 2021-12-06 PROCEDURE — 97110 THERAPEUTIC EXERCISES: CPT | Mod: GP | Performed by: PHYSICAL THERAPIST

## 2021-12-06 PROCEDURE — 97161 PT EVAL LOW COMPLEX 20 MIN: CPT | Mod: GP | Performed by: PHYSICAL THERAPIST

## 2021-12-06 NOTE — PROGRESS NOTES
Physical Therapy Initial Evaluation  Subjective:  The history is provided by the patient. No  was used.   Patient Health History  Alexsander Sin being seen for Lift shoulder pain and arm/ hand numbness and tingling.     Problem began: 9/4/2021.   Problem occurred: Unsure   Pain is reported as 4/10 (Ranges 0-8/10) on pain scale.  General health as reported by patient is good.  Pertinent medical history includes: high blood pressure.   Red flags:  None as reported by patient.  Medical allergies: other. Other medical allergies details: Sulfa.   Surgeries include:  Other and orthopedic surgery. Other surgery history details: Right hand surgery.    Current medications:  High blood pressure medication.    Current occupation is .   Primary job tasks include:  Computer work.                  Therapist Generated HPI Evaluation  Problem details: R Arm Dominant.         Type of problem:  Cervical spine.    This is a chronic condition.  Condition occurred with:  Insidious onset.  Where condition occurred: for unknown reasons.  Patient reports pain:  Cervical left side.  Pain is described as aching and is intermittent.  Pain radiates to:  Shoulder left, upper arm left and hand left. Pain is the same all the time.  Since onset symptoms are unchanged.  Associated symptoms:  Numbness and tingling. Exacerbated by: driving with arm elevated and computer work.  Relieved by: Unknown.  Special tests included:  X-ray (11/15/21).    Restrictions due to condition include:  Working in normal job without restrictions.  Barriers include:  None as reported by patient.                        Objective:  Standing Alignment:    Cervical/Thoracic:  Forward head  Shoulder/UE:  Rounded shoulders and elevated scapula L                  Flexibility/Screens:   Positive screens:  CervicalNegative screens: Shoulder   Upper Extremity:    Decreased left upper extremity flexibility at:  Pectoralis Major and Pectoralis  Minor    Decreased right upper extremity flexibility present at:  Pectoralis Major and Pectoralis Minor    Spine:  Decreased left spine flexibility:  Upper Trap and Levator                    Cervical/Thoracic Evaluation  Cervical AROM: normal       Strength: Fair Scapular Strength  Headaches: none  Cervical Myotomes:  normal                  DTR's:  not assessed          Cervical Dermatomes:  not assessed                    Cervical Palpation:    Tenderness present at Left:    Upper Trap and Levator    Functional Tests:  not assessed    Cervical Stability/Joint Clearing:      Left negative at: 1st Rib    Right negative at:  1st Rib    Cord Sign:  not assessed                                            General     ROS    Assessment/Plan:    Patient is a 61 year old male with cervical complaints.    Patient has the following significant findings with corresponding treatment plan.                Diagnosis 1:  Cervical Radiculopathy Left  Pain -  US, mechanical traction, manual therapy, splint/taping/bracing/orthotics, self management, education, directional preference exercise and home program  Decreased ROM/flexibility - manual therapy, therapeutic exercise, therapeutic activity and home program  Decreased joint mobility - manual therapy, therapeutic exercise, therapeutic activity and home program  Decreased strength - therapeutic exercise, therapeutic activities and home program  Inflammation - self management/home program  Impaired muscle performance - neuro re-education and home program  Decreased function - therapeutic activities and home program  Impaired posture - neuro re-education and home program    Therapy Evaluation Codes:   1) History comprised of:   Personal factors that impact the plan of care:      None.    Comorbidity factors that impact the plan of care are:      High blood pressure.     Medications impacting care: High blood pressure.  2) Examination of Body Systems comprised of:   Body structures  and functions that impact the plan of care:      Cervical spine.   Activity limitations that impact the plan of care are:      Driving and Reading/Computer work.  3) Clinical presentation characteristics are:   Stable/Uncomplicated.  4) Decision-Making    Low complexity using standardized patient assessment instrument and/or measureable assessment of functional outcome.  Cumulative Therapy Evaluation is: Low complexity.    Previous and current functional limitations:  (See Goal Flow Sheet for this information)    Short term and Long term goals: (See Goal Flow Sheet for this information)     Communication ability:  Patient appears to be able to clearly communicate and understand verbal and written communication and follow directions correctly.  Treatment Explanation - The following has been discussed with the patient:   RX ordered/plan of care  Anticipated outcomes  Possible risks and side effects  This patient would benefit from PT intervention to resume normal activities.   Rehab potential is good.    Frequency:  1 X week, once daily  Duration:  for 6 weeks  Discharge Plan:  Achieve all LTG.  Independent in home treatment program.  Return to previous functional level by discharge.  Reach maximal therapeutic benefit.    Please refer to the daily flowsheet for treatment today, total treatment time and time spent performing 1:1 timed codes.

## 2021-12-07 NOTE — PROGRESS NOTES
[facility]    OUTPATIENT Physical Therapy ORTHOPEDIC EVALUATION  PLAN OF TREATMENT FOR OUTPATIENT REHABILITATION  (COMPLETE FOR INITIAL CLAIMS ONLY)  Patient's Last Name, First Name, M.I.  YOB: 1960  Merrick,Alexsander BAH    Provider s Name:  [facility]   Medical Record No.  [id number]   Start of Care Date:  12/06/21   Onset Date:   09/04/21   Type:     _X__PT   ___OT Medical Diagnosis:    Encounter Diagnoses   Name Primary?     Cervical radiculopathy      Acute pain of left shoulder      Cervicalgia      Numbness and tingling in left hand         Treatment Diagnosis:  Cervical Radiculopathy L        Goals:     12/06/21 0500   Body Part   Goals listed below are for Neck   Goal #1   Goal #1 driving/transportation   Previous Functional Level No restrictions   Current Functional Level Uses 1 hand to steer car   Performance Level Radiculopathy L increases with left hand on top of steering wheel = 6/10   STG Target Performance Able to use 2 hands to steer car   Performance Level Pain L = 3/10   Rationale for safe driving   Due date 12/27/21   LTG Target Performance Able to use 2 hands to steer car   Performance Level Pain = 0-1/10   Rationale for safe driving   Due date 01/17/22       Therapy Frequency:  1 x week  Predicted Duration of Therapy Intervention:  6 weeks    Shahla Camargo, PT                 I CERTIFY THE NEED FOR THESE SERVICES FURNISHED UNDER        THIS PLAN OF TREATMENT AND WHILE UNDER MY CARE     (Physician co-signature of this document indicates review and certification of the therapy plan).                       Certification Date From:  12/06/21   Certification Date To:  03/05/22    Referring Provider:  Felicitas Handy    Initial Assessment        See Epic Evaluation SOC Date: 12/06/21

## 2022-01-12 ENCOUNTER — THERAPY VISIT (OUTPATIENT)
Dept: PHYSICAL THERAPY | Facility: CLINIC | Age: 62
End: 2022-01-12
Payer: COMMERCIAL

## 2022-01-12 DIAGNOSIS — M54.12 CERVICAL RADICULOPATHY: ICD-10-CM

## 2022-01-12 PROCEDURE — 97012 MECHANICAL TRACTION THERAPY: CPT | Mod: GP | Performed by: PHYSICAL THERAPIST

## 2022-01-12 PROCEDURE — 97140 MANUAL THERAPY 1/> REGIONS: CPT | Mod: GP | Performed by: PHYSICAL THERAPIST

## 2022-01-12 PROCEDURE — 97112 NEUROMUSCULAR REEDUCATION: CPT | Mod: GP | Performed by: PHYSICAL THERAPIST

## 2022-01-19 ENCOUNTER — THERAPY VISIT (OUTPATIENT)
Dept: PHYSICAL THERAPY | Facility: CLINIC | Age: 62
End: 2022-01-19
Payer: COMMERCIAL

## 2022-01-19 DIAGNOSIS — M54.12 CERVICAL RADICULOPATHY: ICD-10-CM

## 2022-01-19 PROCEDURE — 97012 MECHANICAL TRACTION THERAPY: CPT | Mod: GP | Performed by: PHYSICAL THERAPY ASSISTANT

## 2022-01-19 PROCEDURE — 97140 MANUAL THERAPY 1/> REGIONS: CPT | Mod: GP | Performed by: PHYSICAL THERAPY ASSISTANT

## 2022-01-19 PROCEDURE — 97110 THERAPEUTIC EXERCISES: CPT | Mod: GP | Performed by: PHYSICAL THERAPY ASSISTANT

## 2022-01-26 ENCOUNTER — THERAPY VISIT (OUTPATIENT)
Dept: PHYSICAL THERAPY | Facility: CLINIC | Age: 62
End: 2022-01-26
Payer: COMMERCIAL

## 2022-01-26 DIAGNOSIS — M54.12 CERVICAL RADICULOPATHY: ICD-10-CM

## 2022-01-26 PROCEDURE — 97012 MECHANICAL TRACTION THERAPY: CPT | Mod: GP | Performed by: PHYSICAL THERAPIST

## 2022-01-26 PROCEDURE — 97140 MANUAL THERAPY 1/> REGIONS: CPT | Mod: GP | Performed by: PHYSICAL THERAPIST

## 2022-01-26 PROCEDURE — 97110 THERAPEUTIC EXERCISES: CPT | Mod: GP | Performed by: PHYSICAL THERAPIST

## 2022-02-02 ENCOUNTER — THERAPY VISIT (OUTPATIENT)
Dept: PHYSICAL THERAPY | Facility: CLINIC | Age: 62
End: 2022-02-02
Payer: COMMERCIAL

## 2022-02-02 DIAGNOSIS — M54.12 CERVICAL RADICULOPATHY: ICD-10-CM

## 2022-02-02 PROCEDURE — 97012 MECHANICAL TRACTION THERAPY: CPT | Mod: GP | Performed by: PHYSICAL THERAPY ASSISTANT

## 2022-02-02 PROCEDURE — 97140 MANUAL THERAPY 1/> REGIONS: CPT | Mod: GP | Performed by: PHYSICAL THERAPY ASSISTANT

## 2022-02-08 ENCOUNTER — THERAPY VISIT (OUTPATIENT)
Dept: PHYSICAL THERAPY | Facility: CLINIC | Age: 62
End: 2022-02-08
Payer: COMMERCIAL

## 2022-02-08 DIAGNOSIS — M54.12 CERVICAL RADICULOPATHY: ICD-10-CM

## 2022-02-08 PROCEDURE — 97110 THERAPEUTIC EXERCISES: CPT | Mod: GP | Performed by: PHYSICAL THERAPY ASSISTANT

## 2022-02-08 PROCEDURE — 97140 MANUAL THERAPY 1/> REGIONS: CPT | Mod: GP | Performed by: PHYSICAL THERAPY ASSISTANT

## 2022-02-08 PROCEDURE — 97012 MECHANICAL TRACTION THERAPY: CPT | Mod: GP | Performed by: PHYSICAL THERAPY ASSISTANT

## 2022-02-23 ENCOUNTER — THERAPY VISIT (OUTPATIENT)
Dept: PHYSICAL THERAPY | Facility: CLINIC | Age: 62
End: 2022-02-23
Payer: COMMERCIAL

## 2022-02-23 DIAGNOSIS — M54.12 CERVICAL RADICULOPATHY: ICD-10-CM

## 2022-02-23 PROCEDURE — 97140 MANUAL THERAPY 1/> REGIONS: CPT | Mod: GP | Performed by: PHYSICAL THERAPY ASSISTANT

## 2022-02-23 PROCEDURE — 97110 THERAPEUTIC EXERCISES: CPT | Mod: GP | Performed by: PHYSICAL THERAPY ASSISTANT

## 2022-04-12 PROBLEM — M54.12 CERVICAL RADICULOPATHY: Status: RESOLVED | Noted: 2021-12-06 | Resolved: 2022-04-12

## 2022-04-12 NOTE — PROGRESS NOTES
Discharge Note    Progress reporting period is from initial evaluation date (please see noted date below) to Feb 23, 2022.  Linked Episodes   Type: Episode: Status: Noted: Resolved: Last update: Updated by:   PHYSICAL THERAPY Cervical Radiculopathy L [id number] Active 12/6/2021 2/23/2022  8:26 AM Shahla Camargo, PT      Comments:       Alexsander failed to follow up and current status is unknown.  Please see information below for last relevant information on current status.  Patient seen for 7 visits.    SUBJECTIVE  Subjective changes noted by patient:  Tension along L CV/UT with radicular sx to L UE ~3x/day   .  Current pain level is 3/10.     Previous pain level was  5/10.   Changes in function:  Yes (See Goal flowsheet attached for changes in current functional level)  Adverse reaction to treatment or activity: None    OBJECTIVE  Changes noted in objective findings: CROM WNL with end range tension with L rotation. Repeated extension increased L UE sx, plan to focus on increasing reps/frequency of cv retraction for HEP, also corrected form as pt was elevating slightly.      ASSESSMENT/PLAN  Diagnosis: Cervical Radiculopathy L   Updated problem list and treatment plan:   Pain - HEP  Decreased ROM/flexibility - HEP  STG/LTGs have been met or progress has been made towards goals:  Yes, please see goal flowsheet for most current information  Assessment of Progress: current status is unknown.    Last current status:     Self Management Plans:  HEP  I have re-evaluated this patient and find that the nature, scope, duration and intensity of the therapy is appropriate for the medical condition of the patient.  Alexsander continues to require the following intervention to meet STG and LTG's:  HEP.    Recommendations:  Discharge with current home program.  Patient to follow up with MD as needed.    Please refer to the daily flowsheet for treatment today, total treatment time and time spent performing 1:1 timed codes.    Michael  Olmscheid,PT, DPT, OCS

## 2022-05-01 ENCOUNTER — HEALTH MAINTENANCE LETTER (OUTPATIENT)
Age: 62
End: 2022-05-01

## 2022-05-23 DIAGNOSIS — I10 BENIGN ESSENTIAL HYPERTENSION: ICD-10-CM

## 2022-05-23 DIAGNOSIS — K21.00 GASTROESOPHAGEAL REFLUX DISEASE WITH ESOPHAGITIS WITHOUT HEMORRHAGE: Primary | ICD-10-CM

## 2022-05-25 RX ORDER — LISINOPRIL 40 MG/1
TABLET ORAL
Qty: 150 TABLET | Refills: 0 | Status: SHIPPED | OUTPATIENT
Start: 2022-05-25 | End: 2023-06-01

## 2022-05-25 RX ORDER — OMEPRAZOLE 40 MG/1
CAPSULE, DELAYED RELEASE ORAL
Qty: 150 CAPSULE | Refills: 0 | Status: SHIPPED | OUTPATIENT
Start: 2022-05-25 | End: 2023-06-08

## 2022-05-25 NOTE — TELEPHONE ENCOUNTER
Routing refill request to provider for review/approval because:  Labs not current:  Creatinine, potassium  Patient needs to be seen because it has been more than 1 year since last office visit.

## 2022-06-18 NOTE — MR AVS SNAPSHOT
"              After Visit Summary   6/13/2018    Alexsander Sin    MRN: [id number]           Patient Information     Date Of Birth          1960        Visit Information        Provider Department      6/13/2018 8:30 AM Jarvis Bahena MD [facility]         Follow-ups after your visit        Your next 10 appointments already scheduled     Jun 13, 2018  8:30 AM CDT   [facility] Visit with [facility]a [facility]   [facility]    [address] [place] [id number] [phone]           Bring a current list of meds and any records pertaining to this visit. For Physicals, please bring immunization records and any forms needing to be filled out. Please arrive 10 minutes early to complete paperwork.              Who to contact     If you have questions or need follow up information about today's clinic visit or your schedule please contact [facility] directly at [phone].  Normal or non-critical lab and imaging results will be communicated to you by [employer] [facility] [employer], letter or phone within 4 business days after the clinic has received the results. If you do not hear from us within 7 days, please contact the clinic through [employer] or phone. If you have a critical or abnormal lab result, we will notify you by phone as soon as possible.  Submit refill requests through [employer] or call your pharmacy and they will forward the refill request to us. Please allow 3 business days for your refill to be completed.          Additional Information About Your Visit        [facility] [employer] Information     [facility]ela lets you send messages to your doctor, view your test results, renew your prescriptions, schedule appointments and more. To sign up, go to [email or web address] . Click on \"Log in\" on the left side of the screen, which will take you to the Welcome page. Then click on \"Sign up Now\" on the right side of the page.     You will be asked to enter the access code " listed below, as well as some personal information. Please follow the directions to create your username and password.     Your access code is: [email or web address]  Expires: 2018  8:20 AM     Your access code will  in 90 days. If you need help or a new code, please call your [facility] clinic or [phone].        Care EveryWhere ID     This is your Care EveryWhere ID. This could be used by other organizations to access your [facility] medical records  [id number]        Your Vitals Were     Pulse Temperature Respirations Pulse Oximetry BMI (Body Mass Index)       94 97.2  F (36.2  C) (Temporal) 16 99% 31.14 kg/m2        Blood Pressure from Last 3 Encounters:   18 154/84   04/10/18 (!) 168/104   18 (!) 162/102    Weight from Last 3 Encounters:   18 217 lb (98.4 kg)   04/10/18 218 lb (98.9 kg)   12 213 lb (96.6 kg)              Today, you had the following     No orders found for display       Primary Care Provider Fax #    Physician No Ref-Primary [phone]       No address on file        Equal Access to Services     OLESYA GORMAN : Hadii nickie Zapien, waaxda bryant, qaybta kaalmada adecarrol, alfonzo robles . So [facility] [phone].    ATENCIÓN: Si habla español, tiene a jackson disposición servicios gratuitos de asistencia lingüística. Justine al [phone].    We comply with applicable federal civil rights laws and [place] laws. We do not discriminate on the basis of race, color, national origin, age, disability, sex, sexual orientation, or gender identity.            Thank you!     Thank you for choosing [facility]  for your care. Our goal is always to provide you with excellent care. Hearing back from our patients is one way we can continue to improve our services. Please take a few minutes to complete the written survey that you may receive in the mail after your visit with us. Thank you!             Your Updated Medication List -  Protect others around you: Learn how to safely use, store and throw away your medicines at www.disposemymeds.org.          This list is accurate as of 6/13/18  8:28 AM.  Always use your most recent med list.                   Brand Name Dispense Instructions for use Diagnosis    albuterol 108 (90 Base) MCG/ACT Inhaler    PROAIR HFA/PROVENTIL HFA/VENTOLIN HFA    1 Inhaler    Inhale 1-2 puffs into the lungs every 4 hours as needed For wheezing    Acute bronchospasm       GAVISCON PO      Take by mouth daily as needed        lisinopril 20 MG tablet    PRINIVIL/ZESTRIL    45 tablet    Take 1.5 tablets (30 mg) by mouth daily    Benign essential hypertension          yes

## 2022-09-27 ENCOUNTER — OFFICE VISIT (OUTPATIENT)
Dept: DERMATOLOGY | Facility: CLINIC | Age: 62
End: 2022-09-27
Payer: COMMERCIAL

## 2022-09-27 DIAGNOSIS — L81.4 LENTIGO: Primary | ICD-10-CM

## 2022-09-27 DIAGNOSIS — Z86.006 HISTORY OF MELANOMA IN SITU: ICD-10-CM

## 2022-09-27 DIAGNOSIS — L82.1 SEBORRHEIC KERATOSIS: ICD-10-CM

## 2022-09-27 DIAGNOSIS — D23.9 DERMAL NEVUS: ICD-10-CM

## 2022-09-27 DIAGNOSIS — D22.9 NEVUS: ICD-10-CM

## 2022-09-27 DIAGNOSIS — D18.01 ANGIOMA OF SKIN: ICD-10-CM

## 2022-09-27 PROCEDURE — 99213 OFFICE O/P EST LOW 20 MIN: CPT | Performed by: DERMATOLOGY

## 2022-09-27 ASSESSMENT — PAIN SCALES - GENERAL: PAINLEVEL: NO PAIN (0)

## 2022-09-27 NOTE — PROGRESS NOTES
Alexsander Sin is an extremely pleasant 62 year old year old male patient here today for hx of melanoma in situ.  He denies any new or changing skin lesions.  Patient has no other skin complaints today.  Remainder of the HPI, Meds, PMH, Allergies, FH, and SH was reviewed in chart.      Past Medical History:   Diagnosis Date     Hypertension      Malignant melanoma (H)      Squamous cell carcinoma of skin, unspecified        Past Surgical History:   Procedure Laterality Date     COLONOSCOPY N/A 6/25/2021    Procedure: Colonoscopy;  Surgeon: Jessica Davis MD;  Location:  GI     ESOPHAGOSCOPY, GASTROSCOPY, DUODENOSCOPY (EGD), COMBINED N/A 6/25/2021    Procedure: Esophagogastroduodenoscopy;  Surgeon: Jessica Davis MD;  Location:  GI     HERNIA REPAIR       ORTHOPEDIC SURGERY          Family History   Problem Relation Age of Onset     Hypertension Mother      Lung Cancer Mother      Diabetes Father         prediabetes     Esophageal Cancer Father      Dementia Maternal Grandmother         in 90's     Myocardial Infarction Maternal Grandfather         before 60     Breast Cancer Paternal Grandmother      Stomach Cancer Paternal Grandfather        Social History     Socioeconomic History     Marital status:      Spouse name: Not on file     Number of children: Not on file     Years of education: Not on file     Highest education level: Not on file   Occupational History     Not on file   Tobacco Use     Smoking status: Never Smoker     Smokeless tobacco: Never Used   Vaping Use     Vaping Use: Never used   Substance and Sexual Activity     Alcohol use: Not Currently     Comment: very rare     Drug use: No     Sexual activity: Yes     Partners: Female   Other Topics Concern     Parent/sibling w/ CABG, MI or angioplasty before 65F 55M? No   Social History Narrative     Not on file     Social Determinants of Health     Financial Resource Strain: Not on file   Food Insecurity: Not on file   Transportation Needs:  Not on file   Physical Activity: Not on file   Stress: Not on file   Social Connections: Not on file   Intimate Partner Violence: Not on file   Housing Stability: Not on file       Outpatient Encounter Medications as of 9/27/2022   Medication Sig Dispense Refill     albuterol (PROAIR HFA/PROVENTIL HFA/VENTOLIN HFA) 108 (90 Base) MCG/ACT inhaler Inhale 2 puffs into the lungs every 4 hours as needed for shortness of breath / dyspnea or wheezing (Patient not taking: Reported on 11/15/2021) 18 g 0     guaiFENesin-codeine (ROBITUSSIN AC) 100-10 MG/5ML solution Take 5-10 mLs by mouth At Bedtime (Patient not taking: Reported on 11/15/2021) 100 mL 0     lisinopril (ZESTRIL) 40 MG tablet TAKE ONE TABLET BY MOUTH once daily 150 tablet 0     omeprazole (PRILOSEC) 40 MG DR capsule TAKE ONE CAPSULE BY MOUTH once daily 150 capsule 0     No facility-administered encounter medications on file as of 9/27/2022.             O:   NAD, WDWN, Alert & Oriented, Mood & Affect wnl, Vitals stable   Here today alone   There were no vitals taken for this visit.   General appearance normal   Vitals stable   Alert, oriented and in no acute distress      Following lymph nodes palpated: Occipital, Cervical, Supraclavicular no lad  pigmented macules on trunk and ext with regular borders and pigment networks      Stuck on papules and brown macules on trunk and ext   Red papules on trunk  Flesh colored papules on trunk     The remainder of the full exam was normal; the following areas were examined:  conjunctiva/lids, oral mucosa, neck, peripheral vascular system, abdomen, lymph nodes, digits/nails, eccrine and apocrine glands, scalp/hair, face, neck, chest, abdomen, buttocks, back, RUE, LUE, RLE, LLE       Eyes: Conjunctivae/lids:Normal     ENT: Lips, buccal mucosa, tongue: normal    MSK:Normal    Cardiovascular: peripheral edema none    Pulm: Breathing Normal    Lymph Nodes: No Head and Neck Lymphadenopathy     Neuro/Psych: Orientation:Alert and  Orientedx3 ; Mood/Affect:normal       A/P:  1. Seborrheic keratosis, lentigo, angioma, dermal nevus, nevi, hx of melanoma in situ   It was a pleasure speaking to Alexsander Sin today.  Previous clinic notes and pertinent laboratory tests were reviewed prior to Alexsander Sin's visit.  Nature of benign skin lesions dicussed with patient.  Patient encouraged to perform monthly skin exams.  UV precautions reviewed with patient.  Return to clinic 12 months

## 2022-09-27 NOTE — LETTER
9/27/2022         RE: Alexsander Sin  [address] [facility] [address]        Dear Colleague,    Thank you for referring your patient, Alexsander Sin, to the [facility] [place] [facility]. Please see a copy of my visit note below.    Alexsander Sin is an extremely pleasant 62 year old year old male patient here today for hx of melanoma in situ.  He denies any new or changing skin lesions.  Patient has no other skin complaints today.  Remainder of the HPI, Meds, PMH, Allergies, FH, and SH was reviewed in chart.      Past Medical History:   Diagnosis Date     Hypertension      Malignant melanoma (H)      Squamous cell carcinoma of skin, unspecified        Past Surgical History:   Procedure Laterality Date     COLONOSCOPY N/A 6/25/2021    Procedure: Colonoscopy;  Surgeon: Jessica Davis MD;  Location: [facility]     ESOPHAGOSCOPY, GASTROSCOPY, DUODENOSCOPY (EGD), COMBINED N/A 6/25/2021    Procedure: Esophagogastroduodenoscopy;  Surgeon: Jessica Davis MD;  Location:  GI     HERNIA REPAIR       ORTHOPEDIC SURGERY          Family History   Problem Relation Age of Onset     Hypertension Mother      Lung Cancer Mother      Diabetes Father         prediabetes     Esophageal Cancer Father      Dementia Maternal Grandmother         in 90's     Myocardial Infarction Maternal Grandfather         before 60     Breast Cancer Paternal Grandmother      Stomach Cancer Paternal Grandfather        Social History     Socioeconomic History     Marital status:      Spouse name: Not on file     Number of children: Not on file     Years of education: Not on file     Highest education level: Not on file   Occupational History     Not on file   Tobacco Use     Smoking status: Never Smoker     Smokeless tobacco: Never Used   Vaping Use     Vaping Use: Never used   Substance and Sexual Activity     Alcohol use: Not Currently     Comment: very rare     Drug use: No     Sexual activity: Yes     Partners: Female   Other  Topics Concern     Parent/sibling w/ CABG, MI or angioplasty before 65F 55M? No   Social History Narrative     Not on file     Social Determinants of Health     Financial Resource Strain: Not on file   Food Insecurity: Not on file   Transportation Needs: Not on file   Physical Activity: Not on file   Stress: Not on file   Social Connections: Not on file   Intimate Partner Violence: Not on file   Housing Stability: Not on file       Outpatient Encounter Medications as of 9/27/2022   Medication Sig Dispense Refill     albuterol (PROAIR HFA/PROVENTIL HFA/VENTOLIN HFA) 108 (90 Base) MCG/ACT inhaler Inhale 2 puffs into the lungs every 4 hours as needed for shortness of breath / dyspnea or wheezing (Patient not taking: Reported on 11/15/2021) 18 g 0     guaiFENesin-codeine (ROBITUSSIN AC) 100-10 MG/5ML solution Take 5-10 mLs by mouth At Bedtime (Patient not taking: Reported on 11/15/2021) 100 mL 0     lisinopril (ZESTRIL) 40 MG tablet TAKE ONE TABLET BY MOUTH once daily 150 tablet 0     omeprazole (PRILOSEC) 40 MG DR capsule TAKE ONE CAPSULE BY MOUTH once daily 150 capsule 0     No facility-administered encounter medications on file as of 9/27/2022.             O:   NAD, WDWN, Alert & Oriented, Mood & Affect wnl, Vitals stable   Here today alone   There were no vitals taken for this visit.   General appearance normal   Vitals stable   Alert, oriented and in no acute distress      Following lymph nodes palpated: Occipital, Cervical, Supraclavicular no lad  pigmented macules on trunk and ext with regular borders and pigment networks      Stuck on papules and brown macules on trunk and ext   Red papules on trunk  Flesh colored papules on trunk     The remainder of the full exam was normal; the following areas were examined:  conjunctiva/lids, oral mucosa, neck, peripheral vascular system, abdomen, lymph nodes, digits/nails, eccrine and apocrine glands, scalp/hair, face, neck, chest, abdomen, buttocks, back, RUE, LUE, RLE,  LLE       Eyes: Conjunctivae/lids:Normal     ENT: Lips, buccal mucosa, tongue: normal    MSK:Normal    Cardiovascular: peripheral edema none    Pulm: Breathing Normal    Lymph Nodes: No Head and Neck Lymphadenopathy     Neuro/Psych: Orientation:Alert and Orientedx3 ; Mood/Affect:normal       A/P:  1. Seborrheic keratosis, lentigo, angioma, dermal nevus, nevi, hx of melanoma in situ   It was a pleasure speaking to Alexsander Sin today.  Previous clinic notes and pertinent laboratory tests were reviewed prior to Alexsander Sin's visit.  Nature of benign skin lesions dicussed with patient.  Patient encouraged to perform monthly skin exams.  UV precautions reviewed with patient.  Return to clinic 12 months        Again, thank you for allowing me to participate in the care of your patient.        Sincerely,        Bobo Lopez MD

## 2022-11-14 ENCOUNTER — TELEPHONE (OUTPATIENT)
Dept: FAMILY MEDICINE | Facility: CLINIC | Age: 62
End: 2022-11-14

## 2022-11-14 ENCOUNTER — VIRTUAL VISIT (OUTPATIENT)
Dept: FAMILY MEDICINE | Facility: CLINIC | Age: 62
End: 2022-11-14
Payer: COMMERCIAL

## 2022-11-14 ENCOUNTER — ANCILLARY PROCEDURE (OUTPATIENT)
Dept: GENERAL RADIOLOGY | Facility: OTHER | Age: 62
End: 2022-11-14
Attending: FAMILY MEDICINE
Payer: COMMERCIAL

## 2022-11-14 ENCOUNTER — ALLIED HEALTH/NURSE VISIT (OUTPATIENT)
Dept: FAMILY MEDICINE | Facility: OTHER | Age: 62
End: 2022-11-14
Payer: COMMERCIAL

## 2022-11-14 DIAGNOSIS — J22 LOWER RESP. TRACT INFECTION: ICD-10-CM

## 2022-11-14 DIAGNOSIS — J40 BRONCHITIS: Primary | ICD-10-CM

## 2022-11-14 DIAGNOSIS — J40 BRONCHITIS: ICD-10-CM

## 2022-11-14 DIAGNOSIS — R05.2 SUBACUTE COUGH: Primary | ICD-10-CM

## 2022-11-14 DIAGNOSIS — R05.2 SUBACUTE COUGH: ICD-10-CM

## 2022-11-14 LAB
FLUAV AG SPEC QL IA: NEGATIVE
FLUBV AG SPEC QL IA: NEGATIVE

## 2022-11-14 PROCEDURE — 71046 X-RAY EXAM CHEST 2 VIEWS: CPT | Mod: TC | Performed by: RADIOLOGY

## 2022-11-14 PROCEDURE — 99214 OFFICE O/P EST MOD 30 MIN: CPT | Performed by: FAMILY MEDICINE

## 2022-11-14 PROCEDURE — 87804 INFLUENZA ASSAY W/OPTIC: CPT

## 2022-11-14 PROCEDURE — 99207 PR NO CHARGE NURSE ONLY: CPT

## 2022-11-14 RX ORDER — LEVALBUTEROL TARTRATE 45 UG/1
1-2 AEROSOL, METERED ORAL EVERY 4 HOURS PRN
Qty: 15 G | Refills: 0 | Status: SHIPPED | OUTPATIENT
Start: 2022-11-14 | End: 2023-06-01

## 2022-11-14 RX ORDER — ALBUTEROL SULFATE 90 UG/1
2 AEROSOL, METERED RESPIRATORY (INHALATION) EVERY 4 HOURS PRN
Qty: 18 G | Refills: 0 | Status: SHIPPED | OUTPATIENT
Start: 2022-11-14 | End: 2023-06-01

## 2022-11-14 RX ORDER — PREDNISONE 20 MG/1
40 TABLET ORAL DAILY
Qty: 10 TABLET | Refills: 0 | Status: SHIPPED | OUTPATIENT
Start: 2022-11-14 | End: 2022-11-18

## 2022-11-14 RX ORDER — AZITHROMYCIN 250 MG/1
TABLET, FILM COATED ORAL
Qty: 6 TABLET | Refills: 0 | Status: SHIPPED | OUTPATIENT
Start: 2022-11-14 | End: 2022-11-19

## 2022-11-14 NOTE — TELEPHONE ENCOUNTER
Medication Question or Refill        What medication are you calling about (include dose and sig)?: Levalbuterol inhaler     Who prescribed the medication?: Dr. Roberts      Do you have any questions or concerns?  Yes: Pharmacy calling and is wondering this can be switched to a regular albuterol due to insurance and inventory at pharmacy, wondering if this is possible.    Preferred Pharmacy:   [facility] [place] [address] [address]  [address] [place] [address]  Phone: [phone] Fax: [phone]      Okay to leave a detailed message?: Yes at Other phone number:  [phone]  [facility], [address]

## 2022-11-14 NOTE — PROGRESS NOTES
"Alexsander is a 62 year old who is being evaluated via a billable video visit.      How would you like to obtain your AVS? MyChart  If the video visit is dropped, the invitation should be resent by: Text to cell phone: [phone]  Will anyone else be joining your video visit? No        Assessment & Plan     Subacute cough  As had a rather prolonged course, will rule out pneumonia  - XR Chest 2 Views; Future    Bronchitis  Prolonged course  Zpack, prednisone, albuterol  Discussed that a video visit does not replace seeing him in person, and listening to lungs, so if not better or worsens, will need to be seen  - azithromycin (ZITHROMAX) 250 MG tablet; Take 2 tablets (500 mg) by mouth daily for 1 day, THEN 1 tablet (250 mg) daily for 4 days.  - Influenza A & B Antigen - Clinic Collect    Lower resp. tract infection  As above  - levalbuterol (XOPENEX HFA) 45 MCG/ACT inhaler; Inhale 1-2 puffs into the lungs every 4 hours as needed for shortness of breath / dyspnea  - predniSONE (DELTASONE) 20 MG tablet; Take 2 tablets (40 mg) by mouth daily    BMI:   Estimated body mass index is 30.71 kg/m  as calculated from the following:    Height as of 11/15/21: 1.778 m (5' 10\").    Weight as of 11/15/21: 97.1 kg (214 lb).           No follow-ups on file.    Lorelei Vela MD  [facility]    Subjective   Alexsander is a 62 year old, presenting for the following health issues:  URI      History of Present Illness       Reason for visit:  Cough, trouble breathing, shortness of breath, congestion, running nose, phleum, makingmenot able to sleep.  Symptom onset:  3-4 weeks ago  Symptoms include:  Same as above no feverp  Symptom intensity:  Moderate  Symptom progression:  Worsening  Had these symptoms before:  Yes  Has tried/received treatment for these symptoms:  Yes  Previous treatment was successful:  Yes  Prior treatment description:  Antibiotic, Prednisone, Ablbuterol inhaler,cough syrup  What makes it worse:  " Exerting myself too mucheating spicy foods  What makes it better:  Above meds and rest    He eats 0-1 servings of fruits and vegetables daily.He consumes 3 sweetened beverage(s) daily.He exercises with enough effort to increase his heart rate 10 to 19 minutes per day.  He exercises with enough effort to increase his heart rate 6 days per week.   He is taking medications regularly.       Acute Illness  Acute illness concerns: URI on and off for over a month - Neg Covid at home  Onset/Duration: 1 month  Symptoms:  Fever: No  Chills/Sweats: YES  Headache (location?): YES- congestion  Sinus Pressure: YES  Conjunctivitis:  No  Ear Pain: no  Rhinorrhea: YES  Congestion: YES  Sore Throat: No  Cough: YES-non-productive, productive of green sputum, with shortness of breath  Wheeze: YES  Decreased Appetite: YES  Nausea: No  Vomiting: No  Diarrhea: No  Dysuria/Freq.: No  Dysuria or Hematuria: No  Fatigue/Achiness: YES  Sick/Strep Exposure: YES- work  Therapies tried and outcome: cough syrup    A month ago started with runny nose, cough, got better, than worse, got better, now is much worse again over the weekend. Never totally went away. Has nasal congestion, pain and pressure in head, runny nose, shortness of breath, cough and wheeze, can't sleep at night. Home COVID tests negative. No fever.   Has had similar symptoms in past, last Oct 2021, 2019, was put on prednisone and that really helped.       Review of Systems   As above      Objective           Vitals:  No vitals were obtained today due to virtual visit.    Physical Exam   GENERAL: obviously congested, alert and no distress  EYES: Eyes grossly normal to inspection.  No discharge or erythema, or obvious scleral/conjunctival abnormalities.  RESP: Coughs frequently, no visible cyanosis.  No visible retractions or increased work of breathing.    SKIN: Visible skin clear. No significant rash, abnormal pigmentation or lesions.  NEURO: Cranial nerves grossly intact.   Mentation and speech appropriate for age.  PSYCH: Mentation appears normal, affect normal/bright, judgement and insight intact, normal speech and appearance well-groomed.          Video-Visit Details    Video Start Time: 9:45 am    Type of service:  Video Visit    Video End Time:9:59 AM    Originating Location (pt. Location): Home    Distant Location (provider location):  Off-site    Platform used for Video Visit: Randee

## 2022-11-14 NOTE — PATIENT INSTRUCTIONS
Call clinic for chest x-ray and influenza test    Start antibiotic, prednisone, albuterol inhaler  Return to clinic if symptoms persist or worsen.

## 2022-11-20 ENCOUNTER — HEALTH MAINTENANCE LETTER (OUTPATIENT)
Age: 62
End: 2022-11-20

## 2022-11-21 ENCOUNTER — NURSE TRIAGE (OUTPATIENT)
Dept: INTERNAL MEDICINE | Facility: CLINIC | Age: 62
End: 2022-11-21

## 2022-11-21 NOTE — TELEPHONE ENCOUNTER
Reason for Call:  Same Day Appointment, Requested Provider:      PCP: Jarvis Bahena    Reason for visit: Requesting refill on prednisone     Duration of symptoms: 3 to 4 weeks     Have you been treated for this in the past? Yes    Additional comments: Was told need to be seen by provider     Can we leave a detailed message on this number? YES    Phone number patient can be reached at: Cell number on file:    Telephone Information:   Mobile [phone]       Best Time:     Call taken on 11/21/2022 at 7:46 AM by Sallie Hirsch

## 2022-11-21 NOTE — TELEPHONE ENCOUNTER
He has already been treated so does not need an urgent visit.  If he wants to set up a physical with me at the next available that is fine he has not been seen for over a year and a half.

## 2022-11-21 NOTE — TELEPHONE ENCOUNTER
I talked to the patient and he is still wheezing and feels like it is hard to get air in. Please triage.

## 2022-11-21 NOTE — TELEPHONE ENCOUNTER
I don't see a note where he was told to be seen, there is a refill request sent to the provider that did the virtual visit.  Not sure what to do with this, or if you want to refill the medication.    Please advise.    Sharri MAY/

## 2022-11-21 NOTE — TELEPHONE ENCOUNTER
Patient stated he is having some wheezing and having some mild breathing difficulty that he had a telehealth visit on 11/14/2022 and was diagnosed with bronchitis.  He stated he was treated with prednisone and an antibiotic that he finish a couple days ago.  Patient stated his cough and wheezing is still bothering him, and in his past experience has had to have a 2nd dose of prednisone to help with symptoms.  Patient is requesting another prescription for prednisone.  Advised patient to seek urgent/emergent care for worsening symptoms.  Informed patient that the provider that he had the visit with on 11/14/2022 is not available until tomorrow.  Patient stated understanding and would like message to be sent to that provider.    Will forward to provider, REYNA Davalos from [facility].     Additional Information    Negative: SEVERE difficulty breathing (e.g., struggling for each breath, speaks in single words, pulse > 120)    Negative: Breathing stopped and hasn't returned    Negative: Choking on something    Negative: Bluish (or gray) lips or face    Negative: Difficult to awaken or acting confused (e.g., disoriented, slurred speech)    Negative: Passed out (i.e., fainted, collapsed and was not responding)    Negative: Wheezing started suddenly after medicine, an allergic food, or bee sting    Negative: Stridor    Negative: Slow, shallow and weak breathing    Negative: Sounds like a life-threatening emergency to the triager    Negative: Chest pain    Negative: Wheezing (high pitched whistling sound) and previous asthma attacks or use of asthma medicines    Negative: Difficulty breathing and within 14 days of COVID-19 Exposure    Negative: Difficulty breathing and only present when coughing    Negative: Difficulty breathing and only from stuffy nose    Negative: Difficulty breathing and only from stuffy nose or runny nose from common cold    Negative: MODERATE difficulty breathing (e.g., speaks in  "phrases, SOB even at rest, pulse 100-120) of new-onset or worse than normal    Negative: Oxygen level (e.g., pulse oximetry) 90 percent or lower    Negative: Wheezing can be heard across the room    Negative: Drooling or spitting out saliva (because can't swallow)    Negative: Any history of prior \"blood clot\" in leg or lungs    Negative: Illness requiring prolonged bedrest in past month (e.g., immobilization, long hospital stay)    Negative: Hip or leg fracture (broken bone) in past month (or had cast on leg or ankle in past month)    Negative: Major surgery in the past month    Negative: Long-distance travel in past month (e.g., car, bus, train, plane; with trip lasting 6 or more hours)    Negative: Cancer treatment in past six months (or has cancer now)    Negative: Extra heart beats OR irregular heart beating (i.e., \"palpitations\")    Negative: Fever > 103 F (39.4 C)    Negative: Fever > 101 F (38.3 C) and over 60 years of age    Negative: Fever > 100.0 F (37.8 C) and bedridden (e.g., nursing home patient, stroke, chronic illness, recovering from surgery)    Negative: Fever > 100.0 F (37.8 C) and diabetes mellitus or weak immune system (e.g., HIV positive, cancer chemo, splenectomy, organ transplant, chronic steroids)    Negative: Periods where breathing stops and then resumes normally and bedridden (e.g., nursing home patient, CVA)    Negative: Pregnant or postpartum (from 0 to 6 weeks after delivery)    Negative: Patient sounds very sick or weak to the triager    Negative: MILD difficulty breathing (e.g., minimal/no SOB at rest, SOB with walking, pulse < 100) of new-onset or worse than normal    Negative: Longstanding difficulty breathing (e.g., CHF, COPD, emphysema) and worse than normal    Negative: Longstanding difficulty breathing and not responding to usual therapy    Negative: Continuous (nonstop) coughing    Negative: Patient wants to be seen    Negative: Oxygen level (e.g., pulse oximetry) 91 to 94 " "percent    Negative: MODERATE longstanding difficulty breathing (e.g., speaks in phrases, SOB even at rest, pulse 100-120) and SAME as normal    Negative: MILD longstanding difficulty breathing (e.g., speaks in phrases, SOB even at rest, pulse 100-120) and SAME as normal    Answer Assessment - Initial Assessment Questions  1. RESPIRATORY STATUS: \"Describe your breathing?\" (e.g., wheezing, shortness of breath, unable to speak, severe coughing)       Wheezing    2. ONSET: \"When did this breathing problem begin?\"       A few weeks    3. PATTERN \"Does the difficult breathing come and go, or has it been constant since it started?\"       Constant    4. SEVERITY: \"How bad is your breathing?\" (e.g., mild, moderate, severe)     - MILD: No SOB at rest, mild SOB with walking, speaks normally in sentences, can lie down, no retractions, pulse < 100.     - MODERATE: SOB at rest, SOB with minimal exertion and prefers to sit, cannot lie down flat, speaks in phrases, mild retractions, audible wheezing, pulse 100-120.     - SEVERE: Very SOB at rest, speaks in single words, struggling to breathe, sitting hunched forward, retractions, pulse > 120       Mild    5. RECURRENT SYMPTOM: \"Have you had difficulty breathing before?\" If Yes, ask: \"When was the last time?\" and \"What happened that time?\"       Yes, sometimes takes second round of prednisone to help with symptoms    6. CARDIAC HISTORY: \"Do you have any history of heart disease?\" (e.g., heart attack, angina, bypass surgery, angioplasty)       No    7. LUNG HISTORY: \"Do you have any history of lung disease?\"  (e.g., pulmona  ry embolus, asthma, emphysema)      No    8. CAUSE: \"What do you think is causing the breathing problem?\"       11/14/2022 tele health visit for bronchitis and cough    9. OTHER SYMPTOMS: \"Do you have any other symptoms? (e.g., dizziness, runny nose, cough, chest pain, fever)      No    10. O2 SATURATION MONITOR:  \"Do you use an oxygen saturation monitor (pulse " "oximeter) at home?\" If Yes, \"What is your reading (oxygen level) today?\" \"What is your usual oxygen saturation reading?\" (e.g., 95%)        Unknown    11. PREGNANCY: \"Is there any chance you are pregnant?\" \"When was your last menstrual period?\"        NA    12. TRAVEL: \"Have you traveled out of the country in the last month?\" (e.g., travel history, exposures)        No    Protocols used: BREATHING DIFFICULTY-A-OH    Thi Ann RN    "

## 2023-05-31 ENCOUNTER — TELEPHONE (OUTPATIENT)
Dept: INTERNAL MEDICINE | Facility: CLINIC | Age: 63
End: 2023-05-31
Payer: COMMERCIAL

## 2023-05-31 ENCOUNTER — TELEPHONE (OUTPATIENT)
Dept: INTERNAL MEDICINE | Facility: CLINIC | Age: 63
End: 2023-05-31

## 2023-05-31 NOTE — TELEPHONE ENCOUNTER
"Previous encounter patient c/o Gerd with thick phlegm and was advised to make VRT or e-visit.    Spoke with patient and is having episodes of SOB \"lasting an hour\", wheezing, and 02 sats 0f 91%-92%. Usually 94% or above.    No hx asthma.    Denies fever.    Advised should have OV. No OV appointments available at [facility] or [place] per scheduling.    Discussed [facility].    Patient will go to [facility] as is closest to patient.    Lizzy Brower RN        "

## 2023-05-31 NOTE — TELEPHONE ENCOUNTER
Left detailed message for patient that he can schedule an Evisit or a Virtual visit if needed per RN     Left number for scheduling, virtual visits are available at [place] MAY/Team Coordnator

## 2023-06-01 ENCOUNTER — OFFICE VISIT (OUTPATIENT)
Dept: FAMILY MEDICINE | Facility: OTHER | Age: 63
End: 2023-06-01
Payer: COMMERCIAL

## 2023-06-01 VITALS
DIASTOLIC BLOOD PRESSURE: 100 MMHG | WEIGHT: 213 LBS | TEMPERATURE: 97.3 F | SYSTOLIC BLOOD PRESSURE: 178 MMHG | BODY MASS INDEX: 30.49 KG/M2 | HEIGHT: 70 IN | RESPIRATION RATE: 28 BRPM | HEART RATE: 83 BPM | OXYGEN SATURATION: 94 %

## 2023-06-01 DIAGNOSIS — J45.41 MODERATE PERSISTENT ASTHMA WITH ACUTE EXACERBATION: Primary | ICD-10-CM

## 2023-06-01 DIAGNOSIS — J40 BRONCHITIS: ICD-10-CM

## 2023-06-01 DIAGNOSIS — I10 BENIGN ESSENTIAL HYPERTENSION: ICD-10-CM

## 2023-06-01 PROCEDURE — 99214 OFFICE O/P EST MOD 30 MIN: CPT | Performed by: FAMILY MEDICINE

## 2023-06-01 RX ORDER — PREDNISONE 20 MG/1
40 TABLET ORAL DAILY
Qty: 10 TABLET | Refills: 0 | Status: SHIPPED | OUTPATIENT
Start: 2023-06-01 | End: 2023-06-06

## 2023-06-01 RX ORDER — LISINOPRIL 40 MG/1
TABLET ORAL
Qty: 150 TABLET | Refills: 0 | Status: SHIPPED | OUTPATIENT
Start: 2023-06-01 | End: 2024-01-10

## 2023-06-01 RX ORDER — ALBUTEROL SULFATE 90 UG/1
2 AEROSOL, METERED RESPIRATORY (INHALATION) EVERY 4 HOURS PRN
Qty: 18 G | Refills: 0 | Status: SHIPPED | OUTPATIENT
Start: 2023-06-01 | End: 2024-01-03

## 2023-06-01 RX ORDER — BUDESONIDE AND FORMOTEROL FUMARATE DIHYDRATE 80; 4.5 UG/1; UG/1
AEROSOL RESPIRATORY (INHALATION)
Qty: 20.4 G | Refills: 11 | Status: SHIPPED | OUTPATIENT
Start: 2023-06-01 | End: 2024-01-03

## 2023-06-01 ASSESSMENT — ENCOUNTER SYMPTOMS: SHORTNESS OF BREATH: 1

## 2023-06-01 NOTE — PROGRESS NOTES
Assessment & Plan     Benign essential hypertension  Blood pressures controlled on lisinopril  - lisinopril (ZESTRIL) 40 MG tablet; TAKE ONE TABLET BY MOUTH once daily    Moderate persistent asthma with acute exacerbation  Acute asthma exacerbation based on symptomatology  -prednisone and steroid inhalers as prescribed for acute exacerbation  Recheck in 1 week for a close follow up  - budesonide-formoterol (SYMBICORT) 80-4.5 MCG/ACT Inhaler; Inhale 2 puffs once daily plus 1-2 puffs as needed. May use up to 12 puffs per day.  - predniSONE (DELTASONE) 20 MG tablet; Take 2 tablets (40 mg) by mouth daily for 5 days    Bronchitis  Encouraged avoiding second hand smoke and   - albuterol (PROAIR HFA/PROVENTIL HFA/VENTOLIN HFA) 108 (90 Base) MCG/ACT inhaler; Inhale 2 puffs into the lungs every 4 hours as needed for shortness of breath or wheezing    Lela Candelaria MD  [facility] JHON Beltre is a 62 year old, presenting for the following health issues:  Shortness of Breath        6/1/2023     9:32 AM   Additional Questions   Roomed by ramiro carter     Shortness of Breath    History of Present Illness       Reason for visit:  Shortness of breath  Symptom onset:  3-7 days ago  Symptoms include:  Shortness of breath   cough  Symptom intensity:  Moderate  Symptom progression:  Staying the same  Had these symptoms before:  Yes  Has tried/received treatment for these symptoms:  Yes  Previous treatment was successful:  Yes  Prior treatment description:  Predisone and albuteral inhailer  What makes it worse:  Laying down exertion  What makes it better:  The medicine or time    He eats 0-1 servings of fruits and vegetables daily.He consumes 3 sweetened beverage(s) daily.He exercises with enough effort to increase his heart rate 10 to 19 minutes per day.  He exercises with enough effort to increase his heart rate 3 or less days per week.   He is taking medications regularly.   little cough but lot of  "phlegm. Tough to breath more in the throat than lungs as it seems clogged with phelgm . Used to have hay fever when young. Has noticed some wheezing.Denies any fevers or chest pain. Not a smoker but wife smokes about 1.5 PPD in the house.  for 40 yrs.      Review of Systems   Respiratory: Positive for shortness of breath.       Constitutional, HEENT, cardiovascular, pulmonary, GI, , musculoskeletal, neuro, skin, endocrine and psych systems are negative, except as otherwise noted.      Objective    BP (!) 160/100 (BP Location: Left arm, Patient Position: Sitting, Cuff Size: Adult Regular)   Pulse 83   Temp 97.3  F (36.3  C) (Temporal)   Resp 28   Ht 1.778 m (5' 10\")   Wt 96.6 kg (213 lb)   SpO2 94%   BMI 30.56 kg/m    Body mass index is 30.56 kg/m .  Physical Exam   GENERAL: healthy, alert and no distress  NECK: no adenopathy, no asymmetry, masses, or scars and thyroid normal to palpation  RESP: lungs clear to auscultation - no rales, rhonchi or wheezes and scattered wheezing.  CV: regular rate and rhythm, normal S1 S2, no S3 or S4, no murmur, click or rub, no peripheral edema and peripheral pulses strong          "

## 2023-06-08 ENCOUNTER — OFFICE VISIT (OUTPATIENT)
Dept: FAMILY MEDICINE | Facility: OTHER | Age: 63
End: 2023-06-08
Payer: COMMERCIAL

## 2023-06-08 VITALS
HEIGHT: 70 IN | HEART RATE: 78 BPM | WEIGHT: 209 LBS | SYSTOLIC BLOOD PRESSURE: 120 MMHG | BODY MASS INDEX: 29.92 KG/M2 | TEMPERATURE: 96.9 F | DIASTOLIC BLOOD PRESSURE: 70 MMHG | OXYGEN SATURATION: 95 % | RESPIRATION RATE: 20 BRPM

## 2023-06-08 DIAGNOSIS — J45.41 MODERATE PERSISTENT ASTHMA WITH ACUTE EXACERBATION: Primary | ICD-10-CM

## 2023-06-08 DIAGNOSIS — K21.00 GASTROESOPHAGEAL REFLUX DISEASE WITH ESOPHAGITIS WITHOUT HEMORRHAGE: ICD-10-CM

## 2023-06-08 DIAGNOSIS — J40 BRONCHITIS: ICD-10-CM

## 2023-06-08 DIAGNOSIS — Z11.59 NEED FOR HEPATITIS C SCREENING TEST: ICD-10-CM

## 2023-06-08 DIAGNOSIS — I10 BENIGN ESSENTIAL HYPERTENSION: ICD-10-CM

## 2023-06-08 DIAGNOSIS — J44.89 COPD WITH ASTHMA (H): ICD-10-CM

## 2023-06-08 DIAGNOSIS — Z11.4 SCREENING FOR HIV (HUMAN IMMUNODEFICIENCY VIRUS): ICD-10-CM

## 2023-06-08 PROCEDURE — 99214 OFFICE O/P EST MOD 30 MIN: CPT | Performed by: FAMILY MEDICINE

## 2023-06-08 RX ORDER — ALBUTEROL SULFATE 90 UG/1
2 AEROSOL, METERED RESPIRATORY (INHALATION) EVERY 4 HOURS PRN
Qty: 18 G | Refills: 0 | Status: CANCELLED | OUTPATIENT
Start: 2023-06-08

## 2023-06-08 RX ORDER — DOXYCYCLINE 100 MG/1
100 CAPSULE ORAL 2 TIMES DAILY
Qty: 20 CAPSULE | Refills: 0 | Status: SHIPPED | OUTPATIENT
Start: 2023-06-08 | End: 2023-06-18

## 2023-06-08 RX ORDER — LISINOPRIL 40 MG/1
TABLET ORAL
Qty: 150 TABLET | Refills: 0 | Status: CANCELLED | OUTPATIENT
Start: 2023-06-08

## 2023-06-08 RX ORDER — PREDNISONE 20 MG/1
TABLET ORAL
Qty: 20 TABLET | Refills: 0 | Status: SHIPPED | OUTPATIENT
Start: 2023-06-08 | End: 2023-07-28

## 2023-06-08 RX ORDER — OMEPRAZOLE 40 MG/1
40 CAPSULE, DELAYED RELEASE ORAL DAILY
Qty: 90 CAPSULE | Refills: 0 | Status: SHIPPED | OUTPATIENT
Start: 2023-06-08 | End: 2023-07-28

## 2023-06-08 RX ORDER — IPRATROPIUM BROMIDE AND ALBUTEROL SULFATE 2.5; .5 MG/3ML; MG/3ML
1 SOLUTION RESPIRATORY (INHALATION) EVERY 6 HOURS PRN
Qty: 90 ML | Refills: 0 | Status: SHIPPED | OUTPATIENT
Start: 2023-06-08 | End: 2024-01-11

## 2023-06-08 ASSESSMENT — ASTHMA QUESTIONNAIRES
QUESTION_4 LAST FOUR WEEKS HOW OFTEN HAVE YOU USED YOUR RESCUE INHALER OR NEBULIZER MEDICATION (SUCH AS ALBUTEROL): THREE OR MORE TIMES PER DAY
QUESTION_5 LAST FOUR WEEKS HOW WOULD YOU RATE YOUR ASTHMA CONTROL: SOMEWHAT CONTROLLED
QUESTION_2 LAST FOUR WEEKS HOW OFTEN HAVE YOU HAD SHORTNESS OF BREATH: MORE THAN ONCE A DAY
QUESTION_3 LAST FOUR WEEKS HOW OFTEN DID YOUR ASTHMA SYMPTOMS (WHEEZING, COUGHING, SHORTNESS OF BREATH, CHEST TIGHTNESS OR PAIN) WAKE YOU UP AT NIGHT OR EARLIER THAN USUAL IN THE MORNING: FOUR OR MORE NIGHTS A WEEK
ACT_TOTALSCORE: 9
ACT_TOTALSCORE: 9
QUESTION_1 LAST FOUR WEEKS HOW MUCH OF THE TIME DID YOUR ASTHMA KEEP YOU FROM GETTING AS MUCH DONE AT WORK, SCHOOL OR AT HOME: SOME OF THE TIME

## 2023-06-08 ASSESSMENT — PAIN SCALES - GENERAL: PAINLEVEL: NO PAIN (0)

## 2023-06-08 NOTE — PROGRESS NOTES
"  Assessment & Plan       Gastroesophageal reflux disease with esophagitis without hemorrhage  Continue omeprazole for symptomatic management.  - omeprazole (PRILOSEC) 40 MG DR capsule; Take 1 capsule (40 mg) by mouth daily    Benign essential hypertension  Significantly improved blood pressure since restarting lisinopril.  Continue the medications without changes at the current dose.  Refills provided.      Moderate persistent asthma with acute exacerbation/COPD with asthma (H)    Patient is not a smoker but wife smokes in the house.  He continues to have scattered wheezing throughout bilateral lung fields consistent with asthma and with his symptoms of sputum production and cough especially with secondhand smoke exposure over the years raises concern for superimposed COPD.  Advise a extended taper of prednisone for asthma exacerbation.  Will cover doxycycline and duonebs to help the COPD component. Advised avoiding exposure to second hand smoke.  - predniSONE (DELTASONE) 20 MG tablet; Take 3 tabs by mouth daily x 3 days, then 2 tabs daily x 3 days, then 1 tab daily x 3 days, then 1/2 tab daily x 3 days.  - Nebulizer and Supplies Order for DME - ONLY FOR DME  - ipratropium - albuterol 0.5 mg/2.5 mg/3 mL (DUONEB) 0.5-2.5 (3) MG/3ML neb solution; Take 1 vial (3 mLs) by nebulization every 6 hours as needed for shortness of breath, wheezing or cough    - Nebulizer and Supplies Order for DME - ONLY FOR DME  - ipratropium - albuterol 0.5 mg/2.5 mg/3 mL (DUONEB) 0.5-2.5 (3) MG/3ML neb solution; Take 1 vial (3 mLs) by nebulization every 6 hours as needed for shortness of breath, wheezing or cough  - doxycycline hyclate (VIBRAMYCIN) 100 MG capsule; Take 1 capsule (100 mg) by mouth 2 times daily for 10 days    BMI:   Estimated body mass index is 29.99 kg/m  as calculated from the following:    Height as of this encounter: 1.778 m (5' 10\").    Weight as of this encounter: 94.8 kg (209 lb).       See Patient " "Instructions    Lela Candelaria MD  [facility] CRIS Beltre is a 62 year old, presenting for the following health issues:  Asthma        6/8/2023     9:19 AM   Additional Questions   Roomed by ramiro BRAGG     Asthma Follow-Up    Was ACT completed today?  Yes        6/8/2023     9:14 AM   ACT Total Scores   ACT TOTAL SCORE (Goal Greater than or Equal to 20) 9   In the past 12 months, how many times did you visit the emergency room for your asthma without being admitted to the hospital? 0   In the past 12 months, how many times were you hospitalized overnight because of your asthma? 0       How many days per week do you miss taking your asthma controller medication?  0    Please describe any recent triggers for your asthma: None    Have you had any Emergency Room Visits, Urgent Care Visits, or Hospital Admissions since your last office visit?  No        Review of Systems   Constitutional, HEENT, cardiovascular, pulmonary, GI, , musculoskeletal, neuro, skin, endocrine and psych systems are negative, except as otherwise noted.      Objective    /70 (BP Location: Left arm, Patient Position: Sitting, Cuff Size: Adult Regular)   Pulse 78   Temp 96.9  F (36.1  C) (Temporal)   Resp 20   Ht 1.778 m (5' 10\")   Wt 94.8 kg (209 lb)   SpO2 95%   BMI 29.99 kg/m    Body mass index is 29.99 kg/m .  Physical Exam   GENERAL: healthy, alert and no distress  NECK: no adenopathy, no asymmetry, masses, or scars and thyroid normal to palpation  RESP: Scattered wheezes b/l lung fields - mildly improved compared to last visit a week ago.  CV: regular rate and rhythm, normal S1 S2, no S3 or S4, no murmur, click or rub, no peripheral edema and peripheral pulses strong  ABDOMEN: soft, nontender, no hepatosplenomegaly, no masses and bowel sounds normal  MS: no gross musculoskeletal defects noted, no edema        "

## 2023-07-08 ENCOUNTER — HEALTH MAINTENANCE LETTER (OUTPATIENT)
Age: 63
End: 2023-07-08

## 2023-07-14 ENCOUNTER — APPOINTMENT (OUTPATIENT)
Dept: ULTRASOUND IMAGING | Facility: CLINIC | Age: 63
End: 2023-07-14
Attending: EMERGENCY MEDICINE
Payer: COMMERCIAL

## 2023-07-14 ENCOUNTER — HOSPITAL ENCOUNTER (EMERGENCY)
Facility: CLINIC | Age: 63
Discharge: HOME | End: 2023-07-14
Attending: EMERGENCY MEDICINE | Admitting: EMERGENCY MEDICINE
Payer: COMMERCIAL

## 2023-07-14 VITALS
OXYGEN SATURATION: 98 % | BODY MASS INDEX: 29.2 KG/M2 | SYSTOLIC BLOOD PRESSURE: 145 MMHG | DIASTOLIC BLOOD PRESSURE: 92 MMHG | WEIGHT: 204 LBS | TEMPERATURE: 98.5 F | HEIGHT: 70 IN | RESPIRATION RATE: 18 BRPM | HEART RATE: 79 BPM

## 2023-07-14 DIAGNOSIS — I80.02 THROMBOPHLEBITIS OF SUPERFICIAL VEINS OF LEFT LOWER EXTREMITY: ICD-10-CM

## 2023-07-14 PROCEDURE — 99284 EMERGENCY DEPT VISIT MOD MDM: CPT | Mod: 25 | Performed by: EMERGENCY MEDICINE

## 2023-07-14 PROCEDURE — 93971 EXTREMITY STUDY: CPT | Mod: LT

## 2023-07-14 PROCEDURE — 99283 EMERGENCY DEPT VISIT LOW MDM: CPT | Performed by: EMERGENCY MEDICINE

## 2023-07-14 RX ORDER — DICLOFENAC SODIUM 75 MG/1
75 TABLET, DELAYED RELEASE ORAL 2 TIMES DAILY PRN
Qty: 28 TABLET | Refills: 0 | Status: SHIPPED | OUTPATIENT
Start: 2023-07-14 | End: 2023-07-28

## 2023-07-14 ASSESSMENT — ACTIVITIES OF DAILY LIVING (ADL): ADLS_ACUITY_SCORE: 35

## 2023-07-14 NOTE — DISCHARGE INSTRUCTIONS
Your ultrasound did not show any sign of a clot in the deep veins of your leg.  You do have some inflammation, irritation of the superficial veins of your leg, as well as a small clot in the varicose vein of your leg.  This is not dangerous and should resolve on its own    You may wear compression stockings to help with swelling and pain.  Also keeping leg elevated, applying ice can be helpful    May take the diclofenac twice daily to help with pain and inflammation.  May take with food as it can cause stomach upset.  Do not take ibuprofen or naproxen if you are taking the diclofenac as they are similar medications and can cause GI upset.  It is okay to take additional over-the-counter Tylenol per bottle instructions    Follow-up with your primary doctor as needed    If you develop any new or worsening symptoms do not hesitate to return to the emergency room for evaluation

## 2023-07-14 NOTE — ED PROVIDER NOTES
History     Chief Complaint   Patient presents with    Leg Swelling     HPI  Alexsander Sin is a 62 year old male who presents to the emergency room for concern of left leg swelling.  He noticed this a few days ago.  Woke up with the pain.  Has some redness and it is warm to touch.  No known injury.  He went to urgent care and was told that he should come to the ER for further evaluation as he may have a blood clot in his leg.  He says that he is not having any numbness or tingling in his leg or foot.  Is not having any shortness of breath.  No history of previous clots.  No recent prolonged travel or sitting, no recent surgery    Allergies:  Allergies   Allergen Reactions    Hydrocodone-Acetaminophen Unknown    Sulfa Antibiotics Hives       Problem List:    Patient Active Problem List    Diagnosis Date Noted    Moderate persistent asthma with acute exacerbation 06/01/2023     Priority: Medium    Melanoma in situ of back (H) 05/21/2021     Priority: Medium    Benign essential hypertension 04/10/2018     Priority: Medium        Past Medical History:    Past Medical History:   Diagnosis Date    Hypertension     Malignant melanoma (H)     Squamous cell carcinoma of skin, unspecified        Past Surgical History:    Past Surgical History:   Procedure Laterality Date    COLONOSCOPY N/A 6/25/2021    Procedure: Colonoscopy;  Surgeon: Jessica Davis MD;  Location:  GI    ESOPHAGOSCOPY, GASTROSCOPY, DUODENOSCOPY (EGD), COMBINED N/A 6/25/2021    Procedure: Esophagogastroduodenoscopy;  Surgeon: Jessica Davis MD;  Location:  GI    HERNIA REPAIR      ORTHOPEDIC SURGERY         Family History:    Family History   Problem Relation Age of Onset    Hypertension Mother     Lung Cancer Mother     Diabetes Father         prediabetes    Esophageal Cancer Father     Dementia Maternal Grandmother         in 90's    Myocardial Infarction Maternal Grandfather         before 60    Breast Cancer Paternal Grandmother     Stomach  "Cancer Paternal Grandfather        Social History:  Marital Status:   [2]  Social History     Tobacco Use    Smoking status: Never    Smokeless tobacco: Never   Vaping Use    Vaping Use: Never used   Substance Use Topics    Alcohol use: Not Currently     Comment: very rare    Drug use: No        Medications:    diclofenac (VOLTAREN) 75 MG EC tablet  albuterol (PROAIR HFA/PROVENTIL HFA/VENTOLIN HFA) 108 (90 Base) MCG/ACT inhaler  budesonide-formoterol (SYMBICORT) 80-4.5 MCG/ACT Inhaler  ipratropium - albuterol 0.5 mg/2.5 mg/3 mL (DUONEB) 0.5-2.5 (3) MG/3ML neb solution  lisinopril (ZESTRIL) 40 MG tablet  omeprazole (PRILOSEC) 40 MG DR capsule  predniSONE (DELTASONE) 20 MG tablet          Review of Systems   All other systems reviewed and are negative.      Physical Exam   BP: (!) 145/92  Pulse: 79  Temp: 98.5  F (36.9  C)  Resp: 18  Height: 177.8 cm (5' 10\")  Weight: 92.5 kg (204 lb)  SpO2: 98 %      Physical Exam  Vitals and nursing note reviewed.   Constitutional:       General: He is not in acute distress.     Appearance: He is well-developed. He is not diaphoretic.   HENT:      Head: Normocephalic and atraumatic.   Eyes:      General: No scleral icterus.  Cardiovascular:      Pulses: Normal pulses.   Musculoskeletal:      Cervical back: Normal range of motion and neck supple.        Legs:    Skin:     General: Skin is warm and dry.      Capillary Refill: Capillary refill takes less than 2 seconds.   Neurological:      Mental Status: He is alert and oriented to person, place, and time.         ED Course                 Procedures              Critical Care time:  none               Results for orders placed or performed during the hospital encounter of 07/14/23 (from the past 24 hour(s))   US Lower Extremity Venous Duplex Left    Narrative    VENOUS ULTRASOUND LEFT LOWER EXTREMITY  7/14/2023 12:35 PM     HISTORY: Leg pain, swelling, redness.    COMPARISON: None.    TECHNIQUE: Color Doppler and spectral " waveform analysis performed  throughout the deep veins of the left lower extremity.    FINDINGS: The contralateral right common femoral vein is patent. The  left common femoral, proximal greater saphenous, femoral, and  popliteal veins demonstrate normal blood flow, compression, and  augmentation. Posterior tibial and peroneal veins are compressible.  Superficial thrombophlebitis and thrombosed varicose veins underlying  area of pain/lump in the calf.      Impression    IMPRESSION:  1. Negative for DVT in the left lower extremity.  2. Superficial thrombophlebitis in thrombosed varicosities in the left  calf underlying area of pain.    PIETER RANGEL MD         SYSTEM ID:  [id number]       Medications - No data to display    Assessments & Plan (with Medical Decision Making)  Alexsander is a 62-year-old male presenting to the emergency room for concern of left calf pain and swelling.  See history and focused physical exam as above  Pleasant 62-year-old male in no acute distress, is vitally stable and afebrile.  He does have a area of ropey tenderness along left posterior medial calf, noted above.  Mild erythema and warmth to touch.  Palpable cord, and he also has varicosities noted on bilateral lower extremities.  Suspect superficial thrombophlebitis.  We will get an ultrasound to rule out DVT.  Ultrasound results as above.  Negative for DVT.  Superficial thrombophlebitis and thrombosed varicosities in the left calf in the area of pain.  Advised the patient on appropriate management, NSAIDs, elevation, compression stockings, and follow-up with his primary doctor.  If he develops any new or worsening symptoms he should return promptly to the ER for evaluation.  All questions were answered.  Discharged in no distress     I have reviewed the nursing notes.    I have reviewed the findings, diagnosis, plan and need for follow up with the patient.           Medical Decision Making  The patient's presentation was of low complexity  (an acute and uncomplicated illness or injury).    The patient's evaluation involved:  ordering and/or review of 1 test(s) in this encounter (see separate area of note for details)    The patient's management necessitated moderate risk (prescription drug management including medications given in the ED).        Discharge Medication List as of 7/14/2023  1:16 PM        START taking these medications    Details   diclofenac (VOLTAREN) 75 MG EC tablet Take 1 tablet (75 mg) by mouth 2 times daily as needed for moderate pain, Disp-28 tablet, R-0, E-Prescribe             Final diagnoses:   Thrombophlebitis of superficial veins of left lower extremity       7/14/2023   [facility] EMERGENCY DEPT       Iva Coronel DO  07/14/23 [id number]

## 2023-07-14 NOTE — ED TRIAGE NOTES
Reports waking up today with left calf redness, swelling for a couple days and tenderness. Was sent from urgent care.     Triage Assessment     Row Name 07/14/23 1136       Triage Assessment (Adult)    Airway WDL WDL       Respiratory WDL    Respiratory WDL WDL       Skin Circulation/Temperature WDL    Skin Circulation/Temperature WDL WDL       Cardiac WDL    Cardiac WDL WDL       Peripheral/Neurovascular WDL    Peripheral Neurovascular WDL WDL       Cognitive/Neuro/Behavioral WDL    Cognitive/Neuro/Behavioral WDL WDL

## 2023-07-25 ASSESSMENT — ENCOUNTER SYMPTOMS
NAUSEA: 0
CHILLS: 0
DIZZINESS: 1
DIARRHEA: 0
FEVER: 0
MYALGIAS: 1
WEAKNESS: 0
HEMATOCHEZIA: 0
HEARTBURN: 0
HEADACHES: 0
DYSURIA: 0
SORE THROAT: 0
NERVOUS/ANXIOUS: 0
EYE PAIN: 0
PALPITATIONS: 0
FREQUENCY: 1
ABDOMINAL PAIN: 0
CONSTIPATION: 0
HEMATURIA: 0
ARTHRALGIAS: 0
JOINT SWELLING: 0
SHORTNESS OF BREATH: 0
COUGH: 0
PARESTHESIAS: 0

## 2023-07-28 ENCOUNTER — OFFICE VISIT (OUTPATIENT)
Dept: FAMILY MEDICINE | Facility: OTHER | Age: 63
End: 2023-07-28
Payer: COMMERCIAL

## 2023-07-28 ENCOUNTER — TELEPHONE (OUTPATIENT)
Dept: FAMILY MEDICINE | Facility: OTHER | Age: 63
End: 2023-07-28

## 2023-07-28 VITALS
DIASTOLIC BLOOD PRESSURE: 78 MMHG | HEIGHT: 69 IN | RESPIRATION RATE: 20 BRPM | HEART RATE: 71 BPM | SYSTOLIC BLOOD PRESSURE: 110 MMHG | BODY MASS INDEX: 29.77 KG/M2 | OXYGEN SATURATION: 96 % | WEIGHT: 201 LBS | TEMPERATURE: 98 F

## 2023-07-28 DIAGNOSIS — M41.9 SCOLIOSIS OF THORACOLUMBAR SPINE, UNSPECIFIED SCOLIOSIS TYPE: ICD-10-CM

## 2023-07-28 DIAGNOSIS — R73.03 PREDIABETES: ICD-10-CM

## 2023-07-28 DIAGNOSIS — E11.9 TYPE 2 DIABETES MELLITUS WITHOUT COMPLICATION, WITHOUT LONG-TERM CURRENT USE OF INSULIN (H): ICD-10-CM

## 2023-07-28 DIAGNOSIS — Z11.59 NEED FOR HEPATITIS C SCREENING TEST: ICD-10-CM

## 2023-07-28 DIAGNOSIS — J45.41 MODERATE PERSISTENT ASTHMA WITH ACUTE EXACERBATION: ICD-10-CM

## 2023-07-28 DIAGNOSIS — Z00.00 HEALTH MAINTENANCE EXAMINATION: Primary | ICD-10-CM

## 2023-07-28 DIAGNOSIS — D03.59 MELANOMA IN SITU OF BACK (H): ICD-10-CM

## 2023-07-28 DIAGNOSIS — M40.50 LORDOSIS: ICD-10-CM

## 2023-07-28 DIAGNOSIS — Z11.4 SCREENING FOR HIV (HUMAN IMMUNODEFICIENCY VIRUS): ICD-10-CM

## 2023-07-28 DIAGNOSIS — I10 BENIGN ESSENTIAL HYPERTENSION: ICD-10-CM

## 2023-07-28 DIAGNOSIS — Z13.220 LIPID SCREENING: ICD-10-CM

## 2023-07-28 DIAGNOSIS — K21.00 GASTROESOPHAGEAL REFLUX DISEASE WITH ESOPHAGITIS WITHOUT HEMORRHAGE: ICD-10-CM

## 2023-07-28 LAB
ANION GAP SERPL CALCULATED.3IONS-SCNC: 8 MMOL/L (ref 7–15)
BUN SERPL-MCNC: 11.6 MG/DL (ref 8–23)
CALCIUM SERPL-MCNC: 9.4 MG/DL (ref 8.8–10.2)
CHLORIDE SERPL-SCNC: 102 MMOL/L (ref 98–107)
CHOLEST SERPL-MCNC: 200 MG/DL
CREAT SERPL-MCNC: 1.05 MG/DL (ref 0.67–1.17)
CREAT UR-MCNC: 145.9 MG/DL
DEPRECATED HCO3 PLAS-SCNC: 29 MMOL/L (ref 22–29)
GFR SERPL CREATININE-BSD FRML MDRD: 81 ML/MIN/1.73M2
GLUCOSE SERPL-MCNC: 126 MG/DL (ref 70–99)
HBA1C MFR BLD: 7.7 % (ref 0–5.6)
HDLC SERPL-MCNC: 45 MG/DL
LDLC SERPL CALC-MCNC: 130 MG/DL
MICROALBUMIN UR-MCNC: <12 MG/L
MICROALBUMIN/CREAT UR: NORMAL MG/G{CREAT}
NONHDLC SERPL-MCNC: 155 MG/DL
POTASSIUM SERPL-SCNC: 4.4 MMOL/L (ref 3.4–5.3)
SODIUM SERPL-SCNC: 139 MMOL/L (ref 136–145)
TRIGL SERPL-MCNC: 124 MG/DL

## 2023-07-28 PROCEDURE — 82043 UR ALBUMIN QUANTITATIVE: CPT | Performed by: STUDENT IN AN ORGANIZED HEALTH CARE EDUCATION/TRAINING PROGRAM

## 2023-07-28 PROCEDURE — 80048 BASIC METABOLIC PNL TOTAL CA: CPT | Performed by: STUDENT IN AN ORGANIZED HEALTH CARE EDUCATION/TRAINING PROGRAM

## 2023-07-28 PROCEDURE — 82570 ASSAY OF URINE CREATININE: CPT | Performed by: STUDENT IN AN ORGANIZED HEALTH CARE EDUCATION/TRAINING PROGRAM

## 2023-07-28 PROCEDURE — 99213 OFFICE O/P EST LOW 20 MIN: CPT | Mod: 25 | Performed by: STUDENT IN AN ORGANIZED HEALTH CARE EDUCATION/TRAINING PROGRAM

## 2023-07-28 PROCEDURE — 36415 COLL VENOUS BLD VENIPUNCTURE: CPT | Performed by: STUDENT IN AN ORGANIZED HEALTH CARE EDUCATION/TRAINING PROGRAM

## 2023-07-28 PROCEDURE — 80061 LIPID PANEL: CPT | Performed by: STUDENT IN AN ORGANIZED HEALTH CARE EDUCATION/TRAINING PROGRAM

## 2023-07-28 PROCEDURE — 99396 PREV VISIT EST AGE 40-64: CPT | Performed by: STUDENT IN AN ORGANIZED HEALTH CARE EDUCATION/TRAINING PROGRAM

## 2023-07-28 PROCEDURE — 87389 HIV-1 AG W/HIV-1&-2 AB AG IA: CPT | Performed by: STUDENT IN AN ORGANIZED HEALTH CARE EDUCATION/TRAINING PROGRAM

## 2023-07-28 PROCEDURE — 83036 HEMOGLOBIN GLYCOSYLATED A1C: CPT | Performed by: STUDENT IN AN ORGANIZED HEALTH CARE EDUCATION/TRAINING PROGRAM

## 2023-07-28 PROCEDURE — 86803 HEPATITIS C AB TEST: CPT | Performed by: STUDENT IN AN ORGANIZED HEALTH CARE EDUCATION/TRAINING PROGRAM

## 2023-07-28 RX ORDER — ATORVASTATIN CALCIUM 40 MG/1
40 TABLET, FILM COATED ORAL DAILY
Qty: 90 TABLET | Refills: 1 | Status: SHIPPED | OUTPATIENT
Start: 2023-07-28 | End: 2024-01-03

## 2023-07-28 RX ORDER — METFORMIN HCL 500 MG
TABLET, EXTENDED RELEASE 24 HR ORAL
Qty: 282 TABLET | Refills: 0 | Status: SHIPPED | OUTPATIENT
Start: 2023-07-28 | End: 2024-01-10

## 2023-07-28 RX ORDER — OMEPRAZOLE 40 MG/1
40 CAPSULE, DELAYED RELEASE ORAL DAILY
Qty: 90 CAPSULE | Refills: 3 | Status: SHIPPED | OUTPATIENT
Start: 2023-07-28

## 2023-07-28 ASSESSMENT — PAIN SCALES - GENERAL: PAINLEVEL: MODERATE PAIN (4)

## 2023-07-28 ASSESSMENT — ASTHMA QUESTIONNAIRES: ACT_TOTALSCORE: 20

## 2023-07-28 NOTE — PATIENT INSTRUCTIONS
Shingles and pneumonia vaccine (PCV 20)    -Tylenol and ibuprofen over-the-counter: (follow directions on the bottle).  Tylenol is a safer option.  Avoid ibuprofen if you have chronic kidney disease or history of severe GERD or gastric ulcers.  If you do take ibuprofen, take this with food and a large glass of water.  -Topical treatments over the counter: such as heat or ice (20 minutes on and 20 minutes off, alternate between the 2), Voltaren gel, IcyHot, Biofreeze, Aspercreme, Blue emu, Tiger balm, etc... (whatever you feel comfortable spending your money on)  -Sleep - as much as you need to feel satisfied, consider 7 hours  -Exercise - 30 min/day, 5 days/week  -Nutrition -  Anti-inflammatory diet  i.e. low sugar, no processed, home cooked,  -Stress Management                [employer]  Reviewed:2023         TOP FIVE THINGS FOR HEALTHY LIVING:    -Keep active and moving in some way EVERY DAY (even a simple walk).  -Eat a healthy diet mainly plant based diet (80%) with lots of colors in your food (example: vegetable salad).  -Take some time for yourself every day to relax in some way (read a book).  -Keep a consistent bedtime as much as possible.  -Wear seatbelts/helmet/safety equipment every time you are driving or riding in a vehicle/ATV/motorcycle/snowmobile/etc...           Preventive Health Recommendations  Male Ages 50 - 64    Yearly exam:             See your health care provider every year in order to  o   Review health changes.   o   Discuss preventive care.    o   Review your medicines if your doctor has prescribed any.   Have a cholesterol test every 5 years, or more frequently if you are at risk for high cholesterol/heart disease.   Have a diabetes test (fasting glucose) every three years. If you are at risk for diabetes, you should have this test more often.   Have a colonoscopy at age 50, or have a yearly FIT test (stool test). These exams will check for colon cancer.    Talk with  your health care provider about whether or not a prostate cancer screening test (PSA) is right for you.  You should be tested each year for STDs (sexually transmitted diseases), if you re at risk.     Shots: Get a flu shot each year. Get a tetanus shot every 10 years.     Nutrition:  Eat at least 5 servings of fruits and vegetables daily.   Eat whole-grain bread, whole-wheat pasta and brown rice instead of white grains and rice.   Get adequate Calcium and Vitamin D.     Lifestyle  Exercise for at least 150 minutes a week (30 minutes a day, 5 days a week). This will help you control your weight and prevent disease.   Limit alcohol to one drink per day.   No smoking.   Wear sunscreen to prevent skin cancer.   See your dentist every six months for an exam and cleaning.   See your eye doctor every 1 to 2 years.

## 2023-07-28 NOTE — PROGRESS NOTES
SUBJECTIVE:   CC: Alexsander is an 62 year old who presents for preventative health visit.       7/28/2023     1:13 PM   Additional Questions   Roomed by Jenniffer   Accompanied by self         7/28/2023     1:13 PM   Patient Reported Additional Medications   Patient reports taking the following new medications NA       Healthy Habits:     Getting at least 3 servings of Calcium per day:  Yes    Bi-annual eye exam:  Yes    Dental care twice a year:  NO    Sleep apnea or symptoms of sleep apnea:  Daytime drowsiness    Diet:  Regular (no restrictions)    Frequency of exercise:  4-5 days/week    Duration of exercise:  15-30 minutes    Taking medications regularly:  Yes    Barriers to taking medications:  None    Medication side effects:  None    Additional concerns today:  Yes                Hypertension Follow-up    Do you check your blood pressure regularly outside of the clinic? No   Are you following a low salt diet? No  Are your blood pressures ever more than 140 on the top number (systolic) OR more   than 90 on the bottom number (diastolic), for example 140/90? NA    Have you ever done Advance Care Planning? (For example, a Health Directive, POLST, or a discussion with a medical provider or your loved ones about your wishes):     Social History     Tobacco Use    Smoking status: Never    Smokeless tobacco: Never   Substance Use Topics    Alcohol use: Not Currently     Comment: very rare             7/25/2023    12:19 AM   Alcohol Use   Prescreen: >3 drinks/day or >7 drinks/week? Not Applicable       Last PSA: No results found for: PSA    Reviewed orders with patient. Reviewed health maintenance and updated orders accordingly - Yes  Lab work is in process    Reviewed and updated as needed this visit by clinical staff    Allergies  Meds              Reviewed and updated as needed this visit by Provider     Meds                   OBJECTIVE:   /78   Pulse 71   Temp 98  F (36.7  C) (Temporal)   Resp 20   Ht 1.747 m  "(5' 8.78\")   Wt 91.2 kg (201 lb)   SpO2 96%   BMI 29.87 kg/m      Physical Exam  Vitals and nursing note reviewed.   Constitutional:       General: He is not in acute distress.     Appearance: Normal appearance. He is not ill-appearing, toxic-appearing or diaphoretic.   HENT:      Head: Normocephalic and atraumatic.      Right Ear: Tympanic membrane, ear canal and external ear normal. There is no impacted cerumen.      Left Ear: Tympanic membrane, ear canal and external ear normal. There is no impacted cerumen.      Nose: Nose normal. No congestion or rhinorrhea.      Mouth/Throat:      Mouth: Mucous membranes are moist.      Pharynx: Oropharynx is clear. No oropharyngeal exudate or posterior oropharyngeal erythema.   Eyes:      General: No scleral icterus.        Right eye: No discharge.         Left eye: No discharge.      Extraocular Movements: Extraocular movements intact.      Conjunctiva/sclera: Conjunctivae normal.      Pupils: Pupils are equal, round, and reactive to light.   Cardiovascular:      Rate and Rhythm: Normal rate and regular rhythm.      Heart sounds: No murmur heard.  Pulmonary:      Effort: Pulmonary effort is normal. No respiratory distress.      Breath sounds: Normal breath sounds. No stridor.   Abdominal:      General: There is no distension.      Palpations: Abdomen is soft.      Tenderness: There is no abdominal tenderness.      Hernia: No hernia is present.   Musculoskeletal:         General: Normal range of motion.      Cervical back: Normal range of motion.      Right lower leg: No edema.      Left lower leg: No edema.   Lymphadenopathy:      Cervical: No cervical adenopathy.   Skin:     General: Skin is warm.   Neurological:      Mental Status: He is alert.   Psychiatric:         Mood and Affect: Mood normal.         Behavior: Behavior normal.         Thought Content: Thought content normal.         Judgment: Judgment normal.           ASSESSMENT/PLAN:   (Z00.00) Health maintenance " examination  (primary encounter diagnosis)  (Z13.220) Lipid screening  Plan: Lipid panel reflex to direct LDL Fasting  (Z11.4) Screening for HIV (human immunodeficiency virus)  Plan: HIV Antigen Antibody Combo  (Z11.59) Need for hepatitis C screening test  Plan: Hepatitis C Screen Reflex to HCV RNA Quant and         Genotype      (J45.41) Moderate persistent asthma with acute exacerbation  Potential overlapping COPD as well, does use Symbicort daily and I encouraged him to continue this daily.  GERD does sometimes exacerbate his symptoms, on omeprazole, details below.  Continue with albuterol as needed.  Had a flareup about 2 months ago treated with prednisone as well as antibiotics.  Continue to monitor this closely.  Any concern for flareups would recommend evaluation, stable today    (M41.9) Scoliosis of thoracolumbar spine, unspecified scoliosis type  (M40.50) Lordosis  History of scoliosis as a young child, on exam today morsel slight lordosis with some slight low back discomfort.  We did discuss about treatment options to improve his pain and discomfort, he will do some home exercises and stretches as well as over-the-counter treatments.  We will consider future physical therapy if he wishes.    (D03.59) Melanoma in situ of back (H)  Follows up with dermatology, previous excision of back 2 years ago    (R73.03) Prediabetes  Plan: Hemoglobin A1c    (I10) Benign essential hypertension  Plan: Basic metabolic panel  (Ca, Cl, CO2, Creat,         Gluc, K, Na, BUN), Albumin Random Urine         Quantitative with Creat Ratio  Stable, labs today.  Continue lisinopril    (K21.00) Gastroesophageal reflux disease with esophagitis without hemorrhage  Plan: omeprazole (PRILOSEC) 40 MG DR capsule  Stable    Addendum  Type 2 diabetes mellitus without long-term insulin  This is a new diagnosis for the patient, A1c 7.7, likely culprit is from his family history including his father does have type 2 diabetes.  We will initiate  metformin at a ramping up dosage.  We will also initiate statin medication.  Blood have the patient visit back in about 3 months.    Patient has been advised of split billing requirements and indicates understanding: Yes      COUNSELING:   Reviewed preventive health counseling, as reflected in patient instructions       Regular exercise       Healthy diet/nutrition       Vision screening       Hearing screening       Alcohol Use        Consider Hep C screening for all patients one time for ages 18-79 years       HIV screeninx in teen years, 1x in adult years, and at intervals if high risk       Colorectal cancer screening       Prostate cancer screening        He reports that he has never smoked. He has never used smokeless tobacco.            DEBORA FONTAINE MD  [facility]

## 2023-07-28 NOTE — TELEPHONE ENCOUNTER
Patient informed of provider notes below.  Scheduled for Oct.  No further questions or concerns at this time.    David Freeman, IKEN, RN, PHN  [facility] ~ Registered Nurse  Clinic Triage ~ [facility]  July 28, 2023

## 2023-07-28 NOTE — RESULT ENCOUNTER NOTE
Team - please call patient with results.      His hemoglobin A1c is elevated, this is diagnostic for type 2 diabetes.  Likely culprit of this is from his family history.  I do want him to start on 2 new medications, 1 of which is metformin and the other is a cholesterol medication.  The metformin is a ramping up dosage over the course of the next few months.  Please also schedule him for 3-month follow-up for us to discuss these.  At times metformin can cause upset stomach or even diarrhea but typically this does improve over the course the first couple weeks.  Let me know if he has any other questions about this.    Thank you,    Yehuda Hubbard MD

## 2023-07-28 NOTE — TELEPHONE ENCOUNTER
02:56 pm LMTCB regarding test results.    Sarah Galen [facility]  ----- Message from Pérez Hubbard MD sent at 7/28/2023  2:40 PM CDT -----  Team - please call patient with results.      His hemoglobin A1c is elevated, this is diagnostic for type 2 diabetes.  Likely culprit of this is from his family history.  I do want him to start on 2 new medications, 1 of which is metformin and the other is a cholesterol medication.  The metformin is a ramping up dosage over the course of the next few months.  Please also schedule him for 3-month follow-up for us to discuss these.  At times metformin can cause upset stomach or even diarrhea but typically this does improve over the course the first couple weeks.  Let me know if he has any other questions about this.    Thank you,    Yehuda Hubbard MD

## 2023-07-28 NOTE — LETTER
My COPD Action Plan     Name: Alexsander Sin    YOB: 1960   Date: 7/28/2023    My doctor: DEBORA FONTAINE MD   My clinic: [address]  [facility] [address]  [phone]  My Controller Medicine: symbicort     My Rescue Medicine: albuterol     My Flare Up Medicine: prednisone and/or antibiotics      My COPD Severity: NA     Use of Oxygen: no      Make sure you've had your pneumonia   vaccines.          GREEN ZONE       Doing well today    Usual level of activity and exercise  Usual amount of cough and mucus  No shortness of breath  Usual level of health (thinking clearly, sleeping well, feel like eating) Actions:    Take daily medicines  Use oxygen as prescribed  Follow regular exercise and diet plan  Avoid cigarette smoke and other irritants that harm the lungs           YELLOW ZONE          Having a bad day or flare up    Short of breath more than usual  A lot more sputum (mucus) than usual  Sputum looks yellow, green, tan, brown or bloody  More coughing or wheezing  Fever or chills  Less energy; trouble completing activities  Trouble thinking or focusing  Using quick relief inhaler or nebulizer more often  Poor sleep; symptoms wake me up  Do not feel like eating Actions:    Get plenty of rest  Take daily medicines  Use quick relief inhaler every 4-6 hours  If you use oxygen, call you doctor to see if you should adjust your oxygen  Do breathing exercises or other things to help you relax  Let a loved one, friend or neighbor know you are feeling worse  Call your care team if you have 2 or more symptoms.  Start taking steroids or antibiotics if directed by your care team           RED ZONE       Need medical care now    Severe shortness of breath (feel you can't breathe)  Fever, chills  Not enough breath to do any activity  Trouble coughing up mucus, walking or talking  Blood in mucus  Frequent coughing Rescue medicines are not  working  Not able to sleep because of breathing  Feel confused or drowsy  Chest pain    Actions:    Call your health care team.  If you cannot reach your care team, call 911 or go to the emergency room.        Annual Reminders:  Meet with Care Team, Flu Shot every Fall  Pharmacy:    [id number] - [place], [place] - [address] [facility] [place] [facility] [place] [address]

## 2023-07-29 LAB
HCV AB SERPL QL IA: NONREACTIVE
HIV 1+2 AB+HIV1 P24 AG SERPL QL IA: NONREACTIVE

## 2023-09-27 ENCOUNTER — OFFICE VISIT (OUTPATIENT)
Dept: DERMATOLOGY | Facility: CLINIC | Age: 63
End: 2023-09-27
Payer: COMMERCIAL

## 2023-09-27 DIAGNOSIS — D23.9 DERMAL NEVUS: ICD-10-CM

## 2023-09-27 DIAGNOSIS — D22.9 MULTIPLE BENIGN NEVI: ICD-10-CM

## 2023-09-27 DIAGNOSIS — L81.4 LENTIGO: Primary | ICD-10-CM

## 2023-09-27 DIAGNOSIS — Z86.006 HISTORY OF MELANOMA IN SITU: ICD-10-CM

## 2023-09-27 DIAGNOSIS — D18.01 ANGIOMA OF SKIN: ICD-10-CM

## 2023-09-27 DIAGNOSIS — L82.1 SEBORRHEIC KERATOSIS: ICD-10-CM

## 2023-09-27 PROCEDURE — 99213 OFFICE O/P EST LOW 20 MIN: CPT | Performed by: DERMATOLOGY

## 2023-09-27 NOTE — PATIENT INSTRUCTIONS
Patient Education       Proper skin care from [facility]:    -Eliminate harsh soaps as they strip the natural oils from the skin, often resulting in dry itchy skin ( i.e. Dial, Zest, [place])  -Use mild soaps such as Cetaphil or Dove Sensitive Skin in the shower. You do not need to use soap on arms, legs, and trunk every time you shower unless visibly soiled.   -Avoid hot or cold showers.  -After showering, lightly dry off and apply moisturizing within 2-3 minutes. This will help trap moisture in the skin.   -Aggressive use of a moisturizer at least 1-2 times a day to the entire body (including -Vanicream, Cetaphil, Aquaphor or Cerave) and moisturize hands after every washing.  -We recommend using moisturizers that come in a tub that needs to be scooped out, not a pump. This has more of an oil base. It will hold moisture in your skin much better than a water base moisturizer. The above recommended are non-pore clogging.      Wear a sunscreen with at least SPF 30 on your face, ears, neck and V of the chest daily. Wear sunscreen on other areas of the body if those areas are exposed to the sun throughout the day. Sunscreens can contain physical and/or chemical blockers. Physical blockers are less likely to clog pores, these include zinc oxide and titanium dioxide. Reapply every two hour and after swimming.     Sunscreen examples: https://www.ewg.org/sunscreen/    UV radiation  UVA radiation remains constant throughout the day and throughout the year. It is a longer wavelength than UVB and therefore penetrates deeper into the skin leading to immediate and delayed tanning, photoaging, and skin cancer. 70-80% of UVA and UVB radiation occurs between the hours of 10am-2pm.  UVB radiation  UVB radiation causes the most harmful effects and is more significant during the summer months. However, snow and ice can reflect UVB radiation leading to skin damage during the winter months as well. UVB radiation is  responsible for tanning, burning, inflammation, delayed erythema (pinkness), pigmentation (brown spots), and skin cancer.     I recommend self monthly full body exams and yearly full body exams with a dermatology provider. If you develop a new or changing lesion please follow up for examination. Most skin cancers are pink and scaly or pink and pearly. However, we do see blue/brown/black skin cancers.  Consider the ABCDEs of melanoma when giving yourself your monthly full body exam ( don't forget the groin, buttocks, feet, toes, etc). A-asymmetry, B-borders, C-color, D-diameter, E-elevation or evolving. If you see any of these changes please follow up in clinic. If you cannot see your back I recommend purchasing a hand held mirror to use with a larger wall mirror.       Checking for Skin Cancer  You can find cancer early by checking your skin each month. There are 3 kinds of skin cancer. They are melanoma, basal cell carcinoma, and squamous cell carcinoma. Doing monthly skin checks is the best way to find new marks or skin changes. Follow the instructions below for checking your skin.   The ABCDEs of checking moles for melanoma   Check your moles or growths for signs of melanoma using ABCDE:   Asymmetry: the sides of the mole or growth don t match  Border: the edges are ragged, notched, or blurred  Color: the color within the mole or growth varies  Diameter: the mole or growth is larger than 6 mm (size of a pencil eraser)  Evolving: the size, shape, or color of the mole or growth is changing (evolving is not shown in the images below)    Checking for other types of skin cancer  Basal cell carcinoma or squamous cell carcinoma have symptoms such as:     A spot or mole that looks different from all other marks on your skin  Changes in how an area feels, such as itching, tenderness, or pain  Changes in the skin's surface, such as oozing, bleeding, or scaliness  A sore that does not heal  New swelling or redness beyond  the border of a mole    Who s at risk?  Anyone can get skin cancer. But you are at greater risk if you have:   Fair skin, light-colored hair, or light-colored eyes  Many moles or abnormal moles on your skin  A history of sunburns from sunlight or tanning beds  A family history of skin cancer  A history of exposure to radiation or chemicals  A weakened immune system  If you have had skin cancer in the past, you are at risk for recurring skin cancer.   How to check your skin  Do your monthly skin checkups in front of a full-length mirror. Check all parts of your body, including your:   Head (ears, face, neck, and scalp)  Torso (front, back, and sides)  Arms (tops, undersides, upper, and lower armpits)  Hands (palms, backs, and fingers, including under the nails)  Buttocks and genitals  Legs (front, back, and sides)  Feet (tops, soles, toes, including under the nails, and between toes)  If you have a lot of moles, take digital photos of them each month. Make sure to take photos both up close and from a distance. These can help you see if any moles change over time.   Most skin changes are not cancer. But if you see any changes in your skin, call your doctor right away. Only he or she can diagnose a problem. If you have skin cancer, seeing your doctor can be the first step toward getting the treatment that could save your life.   [employer] last reviewed this educational content on 4/1/2019 2000-2020 [employer]. [address] [place] [address], [place] [address]. All rights reserved. This information is not intended as a substitute for professional medical care. Always follow your healthcare professional's instructions.       When should I call my doctor?  If you are worsening or not improving, please, contact us or seek urgent care as noted below.     Who should I call with questions (adults)?  [facility] (adult and pediatric): [phone]  [facility]  [place] (adult): [phone]  [facility] ([place], [place], [place] and [place]) [phone]  For urgent needs outside of business hours call the [facility] at [phone] and ask for the dermatology resident on call to be paged  If this is a medical emergency and you are unable to reach an ER, Call 911      If you need a prescription refill, please contact your pharmacy. Refills are approved or denied by our Physicians during normal business hours, Monday through Fridays  Per office policy, refills will not be granted if you have not been seen within the past year (or sooner depending on your child's condition)

## 2023-09-27 NOTE — LETTER
9/27/2023         RE: Alexsander Sin  [address] [facility] [address]        Dear Colleague,    Thank you for referring your patient, Alexsander Sin, to the [facility]. Please see a copy of my visit note below.    Alexsander Sin is an extremely pleasant 63 year old year old male patient here today for hx of melanoma in situ.  He denies any new or changing lesions.  Patient has no other skin complaints today.  Remainder of the HPI, Meds, PMH, Allergies, FH, and SH was reviewed in chart.      Past Medical History:   Diagnosis Date     Hypertension      Malignant melanoma (H)      Squamous cell carcinoma of skin, unspecified        Past Surgical History:   Procedure Laterality Date     COLONOSCOPY N/A 6/25/2021    Procedure: Colonoscopy;  Surgeon: Jessica Davis MD;  Location:  GI     ESOPHAGOSCOPY, GASTROSCOPY, DUODENOSCOPY (EGD), COMBINED N/A 6/25/2021    Procedure: Esophagogastroduodenoscopy;  Surgeon: Jessica Davis MD;  Location:  GI     HERNIA REPAIR       ORTHOPEDIC SURGERY          Family History   Problem Relation Age of Onset     Hypertension Mother      Lung Cancer Mother      Diabetes Father         prediabetes     Esophageal Cancer Father      Breast Cancer Father      Colon Cancer Father      Dementia Maternal Grandmother         in 90's     Myocardial Infarction Maternal Grandfather         before 60     Breast Cancer Paternal Grandmother      Stomach Cancer Paternal Grandfather        Social History     Socioeconomic History     Marital status:      Spouse name: Not on file     Number of children: Not on file     Years of education: Not on file     Highest education level: Not on file   Occupational History     Not on file   Tobacco Use     Smoking status: Never     Smokeless tobacco: Never   Vaping Use     Vaping Use: Never used   Substance and Sexual Activity     Alcohol use: Not Currently     Comment: very rare     Drug use: No     Sexual activity:  Yes     Partners: Female   Other Topics Concern     Parent/sibling w/ CABG, MI or angioplasty before 65F 55M? Yes     Comment: Mother   Social History Narrative     Not on file     Social Determinants of Health     Financial Resource Strain: Not on file   Food Insecurity: Not on file   Transportation Needs: Not on file   Physical Activity: Not on file   Stress: Not on file   Social Connections: Not on file   Interpersonal Safety: Not on file   Housing Stability: Not on file       Outpatient Encounter Medications as of 9/27/2023   Medication Sig Dispense Refill     albuterol (PROAIR HFA/PROVENTIL HFA/VENTOLIN HFA) 108 (90 Base) MCG/ACT inhaler Inhale 2 puffs into the lungs every 4 hours as needed for shortness of breath or wheezing 18 g 0     atorvastatin (LIPITOR) 40 MG tablet Take 1 tablet (40 mg) by mouth daily 90 tablet 1     budesonide-formoterol (SYMBICORT) 80-4.5 MCG/ACT Inhaler Inhale 2 puffs once daily plus 1-2 puffs as needed. May use up to 12 puffs per day. 20.4 g 11     diclofenac (VOLTAREN) 75 MG EC tablet Take 1 tablet (75 mg) by mouth 2 times daily as needed for moderate pain 28 tablet 0     ipratropium - albuterol 0.5 mg/2.5 mg/3 mL (DUONEB) 0.5-2.5 (3) MG/3ML neb solution Take 1 vial (3 mLs) by nebulization every 6 hours as needed for shortness of breath, wheezing or cough (Patient not taking: Reported on 7/28/2023) 90 mL 0     lisinopril (ZESTRIL) 40 MG tablet TAKE ONE TABLET BY MOUTH once daily 150 tablet 0     metFORMIN (GLUCOPHAGE XR) 500 MG 24 hr tablet Take 1 tablet (500 mg) by mouth daily (with dinner) for 14 days, THEN 1 tablet (500 mg) 2 times daily (with meals) for 14 days, THEN 2 tablets (1,000 mg) 2 times daily (with meals) for 60 days. 282 tablet 0     omeprazole (PRILOSEC) 40 MG DR capsule Take 1 capsule (40 mg) by mouth daily 90 capsule 3     No facility-administered encounter medications on file as of 9/27/2023.             O:   NAD, WDWN, Alert & Oriented, Mood & Affect wnl, Vitals  stable   General appearance normal   Vitals stable   Alert, oriented and in no acute distress      Following lymph nodes palpated: Occipital, Cervical, Supraclavicular , axilla, inguinal no lad  pigmented macules on trunk and ext with regular borders and pigment networks      Stuck on papules and brown macules on trunk and ext   Red papules on trunk  Flesh colored papules on trunk     The remainder of the full exam was normal; the following areas were examined:  conjunctiva/lids, , neck, peripheral vascular system, abdomen, lymph nodes, digits/nails, eccrine and apocrine glands, scalp/hair, face, neck, chest, abdomen, buttocks, back, RUE, LUE, RLE, LLE       Eyes: Conjunctivae/lids:Normal     ENT: Lips, buccal mucosa, tongue: normal    MSK:Normal    Cardiovascular: peripheral edema none    Pulm: Breathing Normal    Lymph Nodes: No Head and Neck Lymphadenopathy     Neuro/Psych: Orientation:Alert and Orientedx3 ; Mood/Affect:normal       A/P:  1. Seborrheic keratosis, lentigo, angioma, dermal nevus, nevi, hx of melanoma in situ   It was a pleasure speaking to Alexsander Sin today.  Previous clinic notes and pertinent laboratory tests were reviewed prior to Alexsander Sin's visit.  Signs and Symptoms of skin cancer discussed with patient.  Patient encouraged to perform monthly skin exams.  UV precautions reviewed with patient.  Return to clinic 12 months      Again, thank you for allowing me to participate in the care of your patient.        Sincerely,        Bobo Lopez MD

## 2023-09-27 NOTE — PROGRESS NOTES
Alexsander Sin is an extremely pleasant 63 year old year old male patient here today for hx of melanoma in situ.  He denies any new or changing lesions.  Patient has no other skin complaints today.  Remainder of the HPI, Meds, PMH, Allergies, FH, and SH was reviewed in chart.      Past Medical History:   Diagnosis Date    Hypertension     Malignant melanoma (H)     Squamous cell carcinoma of skin, unspecified        Past Surgical History:   Procedure Laterality Date    COLONOSCOPY N/A 6/25/2021    Procedure: Colonoscopy;  Surgeon: Jessica Davis MD;  Location:  GI    ESOPHAGOSCOPY, GASTROSCOPY, DUODENOSCOPY (EGD), COMBINED N/A 6/25/2021    Procedure: Esophagogastroduodenoscopy;  Surgeon: Jessica Davis MD;  Location:  GI    HERNIA REPAIR      ORTHOPEDIC SURGERY          Family History   Problem Relation Age of Onset    Hypertension Mother     Lung Cancer Mother     Diabetes Father         prediabetes    Esophageal Cancer Father     Breast Cancer Father     Colon Cancer Father     Dementia Maternal Grandmother         in 90's    Myocardial Infarction Maternal Grandfather         before 60    Breast Cancer Paternal Grandmother     Stomach Cancer Paternal Grandfather        Social History     Socioeconomic History    Marital status:      Spouse name: Not on file    Number of children: Not on file    Years of education: Not on file    Highest education level: Not on file   Occupational History    Not on file   Tobacco Use    Smoking status: Never    Smokeless tobacco: Never   Vaping Use    Vaping Use: Never used   Substance and Sexual Activity    Alcohol use: Not Currently     Comment: very rare    Drug use: No    Sexual activity: Yes     Partners: Female   Other Topics Concern    Parent/sibling w/ CABG, MI or angioplasty before 65F 55M? Yes     Comment: Mother   Social History Narrative    Not on file     Social Determinants of Health     Financial Resource Strain: Not on file   Food Insecurity: Not on  file   Transportation Needs: Not on file   Physical Activity: Not on file   Stress: Not on file   Social Connections: Not on file   Interpersonal Safety: Not on file   Housing Stability: Not on file       Outpatient Encounter Medications as of 9/27/2023   Medication Sig Dispense Refill    albuterol (PROAIR HFA/PROVENTIL HFA/VENTOLIN HFA) 108 (90 Base) MCG/ACT inhaler Inhale 2 puffs into the lungs every 4 hours as needed for shortness of breath or wheezing 18 g 0    atorvastatin (LIPITOR) 40 MG tablet Take 1 tablet (40 mg) by mouth daily 90 tablet 1    budesonide-formoterol (SYMBICORT) 80-4.5 MCG/ACT Inhaler Inhale 2 puffs once daily plus 1-2 puffs as needed. May use up to 12 puffs per day. 20.4 g 11    diclofenac (VOLTAREN) 75 MG EC tablet Take 1 tablet (75 mg) by mouth 2 times daily as needed for moderate pain 28 tablet 0    ipratropium - albuterol 0.5 mg/2.5 mg/3 mL (DUONEB) 0.5-2.5 (3) MG/3ML neb solution Take 1 vial (3 mLs) by nebulization every 6 hours as needed for shortness of breath, wheezing or cough (Patient not taking: Reported on 7/28/2023) 90 mL 0    lisinopril (ZESTRIL) 40 MG tablet TAKE ONE TABLET BY MOUTH once daily 150 tablet 0    metFORMIN (GLUCOPHAGE XR) 500 MG 24 hr tablet Take 1 tablet (500 mg) by mouth daily (with dinner) for 14 days, THEN 1 tablet (500 mg) 2 times daily (with meals) for 14 days, THEN 2 tablets (1,000 mg) 2 times daily (with meals) for 60 days. 282 tablet 0    omeprazole (PRILOSEC) 40 MG DR capsule Take 1 capsule (40 mg) by mouth daily 90 capsule 3     No facility-administered encounter medications on file as of 9/27/2023.             O:   NAD, WDWN, Alert & Oriented, Mood & Affect wnl, Vitals stable   General appearance normal   Vitals stable   Alert, oriented and in no acute distress      Following lymph nodes palpated: Occipital, Cervical, Supraclavicular , axilla, inguinal no lad  pigmented macules on trunk and ext with regular borders and pigment networks      Stuck on  papules and brown macules on trunk and ext   Red papules on trunk  Flesh colored papules on trunk     The remainder of the full exam was normal; the following areas were examined:  conjunctiva/lids, , neck, peripheral vascular system, abdomen, lymph nodes, digits/nails, eccrine and apocrine glands, scalp/hair, face, neck, chest, abdomen, buttocks, back, RUE, LUE, RLE, LLE       Eyes: Conjunctivae/lids:Normal     ENT: Lips, buccal mucosa, tongue: normal    MSK:Normal    Cardiovascular: peripheral edema none    Pulm: Breathing Normal    Lymph Nodes: No Head and Neck Lymphadenopathy     Neuro/Psych: Orientation:Alert and Orientedx3 ; Mood/Affect:normal       A/P:  1. Seborrheic keratosis, lentigo, angioma, dermal nevus, nevi, hx of melanoma in situ   It was a pleasure speaking to Alexsander Sin today.  Previous clinic notes and pertinent laboratory tests were reviewed prior to Alexsander Sin's visit.  Signs and Symptoms of skin cancer discussed with patient.  Patient encouraged to perform monthly skin exams.  UV precautions reviewed with patient.  Return to clinic 12 months

## 2023-11-25 ENCOUNTER — HEALTH MAINTENANCE LETTER (OUTPATIENT)
Age: 63
End: 2023-11-25

## 2023-12-14 ENCOUNTER — TRANSFERRED RECORDS (OUTPATIENT)
Dept: MULTI SPECIALTY CLINIC | Facility: CLINIC | Age: 63
End: 2023-12-14

## 2023-12-14 LAB — RETINOPATHY: NORMAL

## 2024-01-02 ENCOUNTER — TELEPHONE (OUTPATIENT)
Dept: FAMILY MEDICINE | Facility: OTHER | Age: 64
End: 2024-01-02
Payer: COMMERCIAL

## 2024-01-02 NOTE — TELEPHONE ENCOUNTER
Reason for Call:  Appointment Request    Patient requesting this type of appt:  Sooner appointment for follow up    Requested provider: Pérez Hubbard    Reason patient unable to be scheduled:  Patient accepted and ED follow up appointment however it is not until next week 1/10. The pain in his arm is increasing and he was wondering about getting in sooner due to this.    When does patient want to be seen/preferred time: 1-2 days    Comments: Patient was triaged but is still in pain. Has a follow up but worries about going into the weekend with the pain continuing to get worse like it is.     Could we send this information to you in [employer] or would you prefer to receive a phone call?:   Patient would prefer a phone call   Okay to leave a detailed message?: Yes at Cell number on file:    Telephone Information:   Mobile [phone]       Call taken on 1/2/2024 at 12:15 PM by Renetta Camargo

## 2024-01-02 NOTE — TELEPHONE ENCOUNTER
ED / Discharge Outreach Protocol    Patient Contact    Attempt # 1    Was call answered?  No.  Left message on voicemail with information to primary care clinic back regarding telephone call he had left earlier at [phone], option 2;  may speak with any RN.  Mae ARMENDARIZ RN

## 2024-01-03 ENCOUNTER — ANCILLARY PROCEDURE (OUTPATIENT)
Dept: GENERAL RADIOLOGY | Facility: CLINIC | Age: 64
End: 2024-01-03
Attending: FAMILY MEDICINE
Payer: COMMERCIAL

## 2024-01-03 ENCOUNTER — OFFICE VISIT (OUTPATIENT)
Dept: FAMILY MEDICINE | Facility: CLINIC | Age: 64
End: 2024-01-03
Payer: COMMERCIAL

## 2024-01-03 VITALS
OXYGEN SATURATION: 98 % | HEART RATE: 81 BPM | BODY MASS INDEX: 29.76 KG/M2 | SYSTOLIC BLOOD PRESSURE: 140 MMHG | TEMPERATURE: 98 F | DIASTOLIC BLOOD PRESSURE: 82 MMHG | HEIGHT: 69 IN | WEIGHT: 200.9 LBS | RESPIRATION RATE: 16 BRPM

## 2024-01-03 DIAGNOSIS — M54.12 CERVICAL RADICULOPATHY: ICD-10-CM

## 2024-01-03 DIAGNOSIS — S46.812A STRAIN OF LEFT TRAPEZIUS MUSCLE, INITIAL ENCOUNTER: Primary | ICD-10-CM

## 2024-01-03 DIAGNOSIS — M54.2 CERVICALGIA: ICD-10-CM

## 2024-01-03 DIAGNOSIS — S46.812A STRAIN OF LEFT TRAPEZIUS MUSCLE, INITIAL ENCOUNTER: ICD-10-CM

## 2024-01-03 PROCEDURE — 72040 X-RAY EXAM NECK SPINE 2-3 VW: CPT | Performed by: RADIOLOGY

## 2024-01-03 PROCEDURE — 99214 OFFICE O/P EST MOD 30 MIN: CPT | Performed by: FAMILY MEDICINE

## 2024-01-03 RX ORDER — NAPROXEN 500 MG/1
500 TABLET ORAL 2 TIMES DAILY PRN
Qty: 60 TABLET | Refills: 0 | Status: SHIPPED | OUTPATIENT
Start: 2024-01-03

## 2024-01-03 RX ORDER — METHOCARBAMOL 500 MG/1
500 TABLET, FILM COATED ORAL 3 TIMES DAILY
Qty: 45 TABLET | Refills: 1 | Status: SHIPPED | OUTPATIENT
Start: 2024-01-03

## 2024-01-03 ASSESSMENT — PAIN SCALES - GENERAL: PAINLEVEL: MODERATE PAIN (5)

## 2024-01-03 ASSESSMENT — ASTHMA QUESTIONNAIRES
QUESTION_2 LAST FOUR WEEKS HOW OFTEN HAVE YOU HAD SHORTNESS OF BREATH: ONCE OR TWICE A WEEK
QUESTION_3 LAST FOUR WEEKS HOW OFTEN DID YOUR ASTHMA SYMPTOMS (WHEEZING, COUGHING, SHORTNESS OF BREATH, CHEST TIGHTNESS OR PAIN) WAKE YOU UP AT NIGHT OR EARLIER THAN USUAL IN THE MORNING: ONCE OR TWICE
QUESTION_4 LAST FOUR WEEKS HOW OFTEN HAVE YOU USED YOUR RESCUE INHALER OR NEBULIZER MEDICATION (SUCH AS ALBUTEROL): ONCE A WEEK OR LESS
ACT_TOTALSCORE: 21
QUESTION_5 LAST FOUR WEEKS HOW WOULD YOU RATE YOUR ASTHMA CONTROL: COMPLETELY CONTROLLED
ACT_TOTALSCORE: 21
QUESTION_1 LAST FOUR WEEKS HOW MUCH OF THE TIME DID YOUR ASTHMA KEEP YOU FROM GETTING AS MUCH DONE AT WORK, SCHOOL OR AT HOME: A LITTLE OF THE TIME

## 2024-01-03 NOTE — PROGRESS NOTES
"  Assessment & Plan     Strain of left trapezius muscle, initial encounter  - XR Cervical Spine 2/3 Views; Future  - Chiropractic Referral; Future  - methocarbamol (ROBAXIN) 500 MG tablet; Take 1 tablet (500 mg) by mouth 3 times daily  - naproxen (NAPROSYN) 500 MG tablet; Take 1 tablet (500 mg) by mouth 2 times daily as needed for moderate pain    Cervicalgia  - XR Cervical Spine 2/3 Views; Future  - Chiropractic Referral; Future  - methocarbamol (ROBAXIN) 500 MG tablet; Take 1 tablet (500 mg) by mouth 3 times daily  - naproxen (NAPROSYN) 500 MG tablet; Take 1 tablet (500 mg) by mouth 2 times daily as needed for moderate pain    Cervical radiculopathy  - XR Cervical Spine 2/3 Views; Future  - Chiropractic Referral; Future  - methocarbamol (ROBAXIN) 500 MG tablet; Take 1 tablet (500 mg) by mouth 3 times daily  - naproxen (NAPROSYN) 500 MG tablet; Take 1 tablet (500 mg) by mouth 2 times daily as needed for moderate pain         BMI:   Estimated body mass index is 29.86 kg/m  as calculated from the following:    Height as of this encounter: 1.747 m (5' 8.78\").    Weight as of this encounter: 91.1 kg (200 lb 14.4 oz).   Weight management plan: Discussed healthy diet and exercise guidelines        Lynnette Toure MD  [facility] BRITT Beltre is a 63 year old, presenting for the following health issues:  Neck Pain      1/3/2024     1:54 PM   Additional Questions   Roomed by Jayna LOOMIS CMA   Accompanied by self         1/3/2024     1:54 PM   Patient Reported Additional Medications   Patient reports taking the following new medications n/a       History of Present Illness       Reason for visit:  I think I have a pinched nerve in my neck  Symptom onset:  1-2 weeks ago  Symptom intensity:  Severe  Symptom progression:  Staying the same  Had these symptoms before:  Yes  Has tried/received treatment for these symptoms:  No  What makes it worse:  Turning neck using left armwalking  What makes it " "better:  No    He eats 2-3 servings of fruits and vegetables daily.He consumes 3 sweetened beverage(s) daily.He exercises with enough effort to increase his heart rate 10 to 19 minutes per day.  He exercises with enough effort to increase his heart rate 3 or less days per week. He is missing 1 dose(s) of medications per week.         Pain History:  When did you first notice your pain? Started 1 1/2 weeks    Have you seen anyone else for your pain? Yes - ER 01/01/24   How has your pain affected your ability to work? 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 Neck Pain  Onset/Duration: 1 1/2 weeks ago   Description:   Location: left side into his shoulder and arm, hand  Radiation: into the left hand  Intensity: moderate  Progression of Symptoms:  same  Accompanying Signs & Symptoms:  Burning, tingling, prickly sensation in arm(s): YES  Numbness in arm(s): YES  Weakness in arm(s):  YES  Fever: No  Headache: No  Nausea and/or vomiting: No  History:   Trauma: No  Previous neck pain: YES- couple months ago   Previous surgery or injections: No  Previous Imaging (MRI,X ray): YES- x-ray  Precipitating or alleviating factors:   Does movement impact the pain:  YES, walking on hard concrete floor   Therapies tried and outcome: NSAID - aleve , muscle relaxer's from the past,         Review of Systems   Constitutional, HEENT, cardiovascular, pulmonary, GI, , musculoskeletal, neuro, skin, endocrine and psych systems are negative, except as otherwise noted.      Objective    BP (!) 140/82   Pulse 81   Temp 98  F (36.7  C) (Temporal)   Resp 16   Ht 1.747 m (5' 8.78\")   Wt 91.1 kg (200 lb 14.4 oz)   SpO2 98%   BMI 29.86 kg/m    Body mass index is 29.86 kg/m .  Physical Exam  Musculoskeletal:      Cervical back: Spasms and tenderness present.        GENERAL: healthy, alert and no distress              Results for orders placed or performed in " visit on 01/03/24   XR Cervical Spine 2/3 Views     Status: None    Narrative    XR CERVICAL SPINE 2/3 VIEWS, 1/3/2024 3:17 PM    HISTORY: Strain of left trapezius muscle, initial encounter;  Cervicalgia; Cervical radiculopathy    COMPARISON: Plain radiographs 11/15/2021      Impression    IMPRESSION:   AP, lateral and odontoid views of the cervical spine were obtained.  Unchanged mild anterolisthesis at C4-5. No vertebral body height loss.  Multilevel disc height loss, osteophyte formation and facet  hypertrophy. No acute fracture or subluxation. No prevertebral edema.  Clear lung apices.    CATHERINE OTT MD         SYSTEM ID:  [id number]

## 2024-01-10 ENCOUNTER — OFFICE VISIT (OUTPATIENT)
Dept: FAMILY MEDICINE | Facility: OTHER | Age: 64
End: 2024-01-10
Payer: COMMERCIAL

## 2024-01-10 VITALS
DIASTOLIC BLOOD PRESSURE: 76 MMHG | HEIGHT: 69 IN | SYSTOLIC BLOOD PRESSURE: 130 MMHG | OXYGEN SATURATION: 97 % | RESPIRATION RATE: 16 BRPM | TEMPERATURE: 97.3 F | WEIGHT: 200 LBS | BODY MASS INDEX: 29.62 KG/M2 | HEART RATE: 77 BPM

## 2024-01-10 DIAGNOSIS — I10 BENIGN ESSENTIAL HYPERTENSION: ICD-10-CM

## 2024-01-10 DIAGNOSIS — M54.2 NECK PAIN: ICD-10-CM

## 2024-01-10 DIAGNOSIS — M79.622 PAIN OF LEFT UPPER ARM: ICD-10-CM

## 2024-01-10 DIAGNOSIS — E11.9 TYPE 2 DIABETES MELLITUS WITHOUT COMPLICATION, WITHOUT LONG-TERM CURRENT USE OF INSULIN (H): Primary | ICD-10-CM

## 2024-01-10 DIAGNOSIS — J44.9 CHRONIC OBSTRUCTIVE PULMONARY DISEASE, UNSPECIFIED COPD TYPE (H): ICD-10-CM

## 2024-01-10 DIAGNOSIS — D03.59 MELANOMA IN SITU OF BACK (H): ICD-10-CM

## 2024-01-10 LAB
ANION GAP SERPL CALCULATED.3IONS-SCNC: 10 MMOL/L (ref 7–15)
BUN SERPL-MCNC: 16.6 MG/DL (ref 8–23)
CALCIUM SERPL-MCNC: 9.2 MG/DL (ref 8.8–10.2)
CHLORIDE SERPL-SCNC: 101 MMOL/L (ref 98–107)
CREAT SERPL-MCNC: 0.99 MG/DL (ref 0.67–1.17)
DEPRECATED HCO3 PLAS-SCNC: 26 MMOL/L (ref 22–29)
EGFRCR SERPLBLD CKD-EPI 2021: 86 ML/MIN/1.73M2
GLUCOSE SERPL-MCNC: 131 MG/DL (ref 70–99)
HBA1C MFR BLD: 7 % (ref 0–5.6)
POTASSIUM SERPL-SCNC: 4.6 MMOL/L (ref 3.4–5.3)
SODIUM SERPL-SCNC: 137 MMOL/L (ref 135–145)

## 2024-01-10 PROCEDURE — 83036 HEMOGLOBIN GLYCOSYLATED A1C: CPT | Performed by: STUDENT IN AN ORGANIZED HEALTH CARE EDUCATION/TRAINING PROGRAM

## 2024-01-10 PROCEDURE — 80048 BASIC METABOLIC PNL TOTAL CA: CPT | Performed by: STUDENT IN AN ORGANIZED HEALTH CARE EDUCATION/TRAINING PROGRAM

## 2024-01-10 PROCEDURE — 99214 OFFICE O/P EST MOD 30 MIN: CPT | Performed by: STUDENT IN AN ORGANIZED HEALTH CARE EDUCATION/TRAINING PROGRAM

## 2024-01-10 PROCEDURE — 36415 COLL VENOUS BLD VENIPUNCTURE: CPT | Performed by: STUDENT IN AN ORGANIZED HEALTH CARE EDUCATION/TRAINING PROGRAM

## 2024-01-10 RX ORDER — LOSARTAN POTASSIUM 100 MG/1
100 TABLET ORAL DAILY
Qty: 90 TABLET | Refills: 0 | Status: SHIPPED | OUTPATIENT
Start: 2024-01-10 | End: 2024-05-28

## 2024-01-10 RX ORDER — ALBUTEROL SULFATE 90 UG/1
2 AEROSOL, METERED RESPIRATORY (INHALATION) EVERY 6 HOURS PRN
Qty: 18 G | Refills: 1 | Status: SHIPPED | OUTPATIENT
Start: 2024-01-10

## 2024-01-10 RX ORDER — METFORMIN HCL 500 MG
TABLET, EXTENDED RELEASE 24 HR ORAL
Qty: 282 TABLET | Refills: 0 | Status: SHIPPED | OUTPATIENT
Start: 2024-01-10 | End: 2024-04-07

## 2024-01-10 RX ORDER — GABAPENTIN 300 MG/1
300 CAPSULE ORAL 3 TIMES DAILY
Qty: 90 CAPSULE | Refills: 0 | Status: SHIPPED | OUTPATIENT
Start: 2024-01-10

## 2024-01-10 ASSESSMENT — PAIN SCALES - GENERAL: PAINLEVEL: MODERATE PAIN (5)

## 2024-01-10 NOTE — PROGRESS NOTES
Assessment & Plan     Type 2 diabetes mellitus without complication, without long-term current use of insulin (H)  - HEMOGLOBIN A1C  - metFORMIN (GLUCOPHAGE XR) 500 MG 24 hr tablet; Take 1 tablet (500 mg) by mouth daily (with dinner) for 14 days, THEN 1 tablet (500 mg) 2 times daily (with meals) for 14 days, THEN 2 tablets (1,000 mg) 2 times daily (with meals) for 60 days.  - Physical Therapy Referral; Future  - Adult Diabetes Education  Referral; Future  - Basic metabolic panel  (Ca, Cl, CO2, Creat, Gluc, K, Na, BUN)  Patient states that he would like begin medications to get his diabetes under control. His A1C was 7.7 about 5 months ago but he didn't want pharmacological intervention then. Denies any vision changes, polyuria, polydipsia, or polyphagia. No unhealing ulcers or lower extremity paraesthesia. A1C was 7.0 today, so prescribed metformin and advised healthy diet and exercise. Referred to diabetes education and recommended daily foot checks. Ordered BMP to check electrolytes and kidney function. Follow up in 3 months for A1C recheck.  Will also need to discuss with the patient next visit for consideration of statin medication.    Benign essential hypertension  - losartan (COZAAR) 100 MG tablet; Take 1 tablet (100 mg) by mouth daily  Patient states that he stopped taking his lisinopril since November due to increase in phlegm production when taking the medication. He notes that his breathing seems better now with less coughing and he doesn't use his inhaler as often. He periodically checks his blood pressure at home and is usually around 140/80. Advised to change lisinopril to losartan 100 MG. Patient agreed to the change.     Neck pain  - Physical Therapy Referral; Future  - Adult Diabetes Education  Referral; Future  - gabapentin (NEURONTIN) 300 MG capsule; Take 1 capsule (300 mg) by mouth 3 times daily  Pain of left upper arm  - gabapentin (NEURONTIN) 300 MG capsule; Take 1 capsule  (300 mg) by mouth 3 times daily  Patient was seen in the ED on 01/01/2024 for left sided neck pain with left sided hand pain and weakness. Symptoms were consistent with cervical radiculopathy. He has seen a chiropractor, which improved his neck pain but hand weakness and pain persist. Has an MRI scheduled on 1/16/2024. Referred to PT to help strengthen and regain function of left hand.    Chronic obstructive pulmonary disease, unspecified COPD type (H)  - albuterol (PROAIR HFA/PROVENTIL HFA/VENTOLIN HFA) 108 (90 Base) MCG/ACT inhaler; Inhale 2 puffs into the lungs every 6 hours as needed for shortness of breath, wheezing or cough  Prescribed albuterol to be used as needed for COPD.  Previously was on Symbicort but patient felt like he did not require it daily, he would prefer an as needed use.  Will monitor this more closely but will start with his albuterol as needed.  May need to consider daily medication in the future if needed.    Melanoma in situ of back (H)  Follows with dermatology for melanoma excision of back in 2021.       DEBORA FONTAINE MD  [facility]    Isabella Beltre is a 63 year old, presenting for the following health issues:  Follow Up (ER visit)        1/10/2024     2:52 PM   Additional Questions   Roomed by Meghan BRAGG       Diabetes Follow-up    How often are you checking your blood sugar? Not at all  What concerns do you have today about your diabetes?Other: high glucose, wondering if nerve pain could be related to DM   Do you have any of these symptoms? (Select all that apply)  No numbness or tingling in feet.  No redness, sores or blisters on feet.  No complaints of excessive thirst.  No reports of blurry vision.  No significant changes to weight.  Have you had a diabetic eye exam in the last 12 months? No        BP Readings from Last 2 Encounters:   01/10/24 130/76   01/03/24 (!) 140/82     Hemoglobin A1C (%)   Date Value   01/10/2024 7.0 (H)   07/28/2023  "7.7 (H)     LDL Cholesterol Calculated (mg/dL)   Date Value   07/28/2023 130 (H)   05/21/2021 102 (H)         ED/UC Followup:    Facility:  [facility]  Date of visit: 1/1/24  Reason for visit: chest pain  Current Status: neck is getting better, medication helping arm pain. MRI set up for 1/16/24          Review of Systems   Constitutional, HEENT, cardiovascular, pulmonary, gi and gu systems are negative, except as otherwise noted.      Objective    /76   Pulse 77   Temp 97.3  F (36.3  C) (Temporal)   Resp 16   Ht 1.745 m (5' 8.7\")   Wt 90.7 kg (200 lb)   SpO2 97%   BMI 29.79 kg/m    Body mass index is 29.79 kg/m .  Physical Exam  Vitals and nursing note reviewed.   Constitutional:       General: He is not in acute distress.     Appearance: Normal appearance. He is not ill-appearing, toxic-appearing or diaphoretic.   HENT:      Head: Normocephalic and atraumatic.      Right Ear: Tympanic membrane, ear canal and external ear normal. There is no impacted cerumen.      Left Ear: Tympanic membrane, ear canal and external ear normal. There is no impacted cerumen.      Nose: Nose normal. No congestion or rhinorrhea.      Mouth/Throat:      Mouth: Mucous membranes are moist.      Pharynx: Oropharynx is clear. No oropharyngeal exudate or posterior oropharyngeal erythema.   Eyes:      General:         Right eye: No discharge.         Left eye: No discharge.      Extraocular Movements: Extraocular movements intact.      Conjunctiva/sclera: Conjunctivae normal.      Pupils: Pupils are equal, round, and reactive to light.   Cardiovascular:      Rate and Rhythm: Normal rate and regular rhythm.      Heart sounds: No murmur heard.  Pulmonary:      Effort: Pulmonary effort is normal. No respiratory distress.      Breath sounds: Normal breath sounds.   Musculoskeletal:         General: Normal range of motion.      Cervical back: Normal range of motion.   Lymphadenopathy:      Cervical: No cervical " adenopathy.   Neurological:      Mental Status: He is alert.      Comments: Slightly decreased  strength of the left hand compared to the right.  Positive Spurling's left side paresthesias felt in her fingertips.   Psychiatric:         Mood and Affect: Mood normal.         Behavior: Behavior normal.         Thought Content: Thought content normal.

## 2024-01-11 NOTE — RESULT ENCOUNTER NOTE
Alexsander,    A1C has improved from previous result, continue with the medication plan we discussed and we will follow up in 3 months.      If you have any questions feel free to call the clinic at [phone].      Thank you,    Pérez Hubbard MD

## 2024-01-16 ENCOUNTER — THERAPY VISIT (OUTPATIENT)
Dept: PHYSICAL THERAPY | Facility: CLINIC | Age: 64
End: 2024-01-16
Attending: STUDENT IN AN ORGANIZED HEALTH CARE EDUCATION/TRAINING PROGRAM
Payer: COMMERCIAL

## 2024-01-16 DIAGNOSIS — M54.12 CERVICAL RADICULOPATHY: Primary | ICD-10-CM

## 2024-01-16 DIAGNOSIS — M54.2 NECK PAIN: ICD-10-CM

## 2024-01-16 DIAGNOSIS — E11.9 TYPE 2 DIABETES MELLITUS WITHOUT COMPLICATION, WITHOUT LONG-TERM CURRENT USE OF INSULIN (H): ICD-10-CM

## 2024-01-16 PROCEDURE — 97161 PT EVAL LOW COMPLEX 20 MIN: CPT | Mod: GP | Performed by: PHYSICAL THERAPIST

## 2024-01-16 PROCEDURE — 97110 THERAPEUTIC EXERCISES: CPT | Mod: 59 | Performed by: PHYSICAL THERAPIST

## 2024-01-16 PROCEDURE — 97530 THERAPEUTIC ACTIVITIES: CPT | Mod: GP | Performed by: PHYSICAL THERAPIST

## 2024-01-16 NOTE — PROGRESS NOTES
PHYSICAL THERAPY EVALUATION  Type of Visit: Evaluation    See electronic medical record for Abuse and Falls Screening details.    Subjective       Presenting condition or subjective complaint: possible pinched nerve in neck; pain in the neck, L shoulder, arm, elbow to hand (little finger, ring finger).  Weakness L hand  Date of onset: 01/01/24    Relevant medical history: Cold or hot arm or leg; High blood pressure   Dates & types of surgery: R hand surgery 1987    Prior diagnostic imaging/testing results: -- (MRI scheduled for 1/16/2024)     Prior therapy history for the same diagnosis, illness or injury: Yes PT about a year ago; chiropractic this episode    Prior Level of Function  Transfers: Independent  Ambulation: Independent  ADL: Independent      Living Environment  Social support: With a significant other or spouse   Type of home: House; 2-story   Stairs to enter the home: No       Ramp: No   Stairs inside the home: Yes 0 (?10) Is there a railing: Yes   Help at home: None  Equipment owned:       Employment:   Supervisor  Hobbies/Interests:      Patient goals for therapy: gain full strength in the L hand and arm again; be painfree    Pain assessment: See objective evaluation for additional pain details     Objective   CERVICAL SPINE EVALUATION  Functional disability score (NDI):  30%  VAS score (0-10): 3/10 with medication; was severe prior to medication    ADDITIONAL HISTORY:  Present symptoms:  B neck pain into B UT/periscap area with L sided UE symptoms into the ring and pinky finger   Pain quality: Aching, Numb, Stabbing, and Tingling  Paresthesia (yes/no):  yes, ulnar nerve    Symptoms (improving/unchanging/worsening):  improving with medication.  Symptoms commenced as a result of: unknown; ? sleep   Condition occurred in the following environment:  home    Symptoms at onset (neck/arm/forearm/headache): neck, upper back and symptoms down the L UE  Constant symptoms (neck/arm/forearm/headache): neck,  upper back and symptoms down the L UE  Intermittent symptoms (neck/arm/forearm/headache):     Symptoms are made worse with the following: Always Bending, Sometimes Sitting, Always Turning, time of day - No effect (variable depending on activity, holding a heavy pan to wash dishes with the L hand (ok if closer to body)   Symptoms are made better with the following: medication, chiropractic care - 3 sessions - TENS with ice and manual traction     Disturbed sleep (yes/no): no longer since started the medication Number of pillows: supine with 1 pillow (had been rolling onto stomach but now making sure not to do that    Year of first episode: started PT for cervical radiculopathy December 2021    Previous history: had some bouts of neck pain the summer of 2023 but nothing as severe as this episode      Specific Questions: (as reported by the patient)  Dizziness/Tinnitus/Nausea/Swallowing (pos/neg): negative  Gait/Upper Limbs (normal/abnormal): abnormal - difficulty picking up things and dec finger ROM   Medications (nil/NSAIDS/anlag/steroids/anticoag/other):  Albuterol, Diclofenac, Gabapentin, Cozaar, Metformin, Omeprazole, Robaxin  Medical allergies:  Sulfa, hydrocodone  General health (excellent/good/fair/poor):  fair  Imaging (None/Xray/MRI/Other):  MRI tonight  Recent or major surgery (yes/no): R hand surgery 1987; mole removed about 2020  Night pain (yes/no): no  Accidents (yes/no): 2021 (spring?)  Unexplained weight loss (yes/no): no  Barriers at home: no  Other red flags: no    Postural Observation:   Sitting: Slump  Protruded head: Yes Lateral deviation:  Right. Relevant: Yes  Change of posture: No effect Wry Neck: Nil  Other observations/functional baselines:      Neurological:  Motor Deficit:  L Triceps  4+/5, Wrist ext 4/5, thumb 3/5, intrinsics 1-2/5 and  weakness    Reflexes:  symmetrical UE   Sensory Deficit:  decreased L UT, posterior arm and T2  Neurodynamic tests:  Ulnar nerve (+), tightness with  ULTT    Movement Loss:   Albert Mod Min Nil Symptoms   Protrusion    x L neck, Aubree scap   Flexion    x Pulling on the R    Retraction    x Neck, posterior shoulder into posterior arm   Extension    70 Dev to the R but no inc sym   Lateral flexion R    45 Stretch L neck   Lateral flexion L 15    L side of head, L scap   Rotation R    68 Mild stretch on L   Rotation L    58 Inc sym into L scap     Test Movements:   During: produces, abolishes, increases, decreases, no effect, centralizing, peripheralizing  After: better, worse, no better, no worse, no effect, centralized, peripheralized    Symptomatic response Mechanical response    During testing After testing Effect - increased ROM, decreased ROM, or key functional test No Effect   Pretest symptoms sitting: 3/10 neck, B UT and upper back, down L UE into the little and ring finger     Rep PRO       Rep RET Increases    No Worse    Inc Rot R    Rep RET EXT                  Pretest symptoms lying:  not assessed   During testing After testing Effect - increased ROM, decreased ROM, or key functional test No Effect   Rep RET       Rep RET EXT         If required, pretest symptoms sitting:  3/10 neck, B UT and upper back, down L UE into the little and ring finger   During testing After testing Effect - increased ROM, decreased ROM, or key functional test No Effect   Rep LF-R       Rep LF-L Decreases    No Better    Inc strength L hand, improved head posture    Rep ROT-R       Rep ROT-L       Rep FLEX         Static/Other Tests:   Supine SB to L -- no change initially with self SB;  with clinician o/p - dec B, inc CROM, improved posture and inc strength    Other Tests  (+) Bakody sign  Provisional Classification:  Derangement - Asymmetrical, unilateral, symptoms below elbow, with wry neck deformity and Inconclusive/Other - Mechanically Unresponsive Radiculopathy    Potential Drivers of Pain and/or Disability: Comorbidities    Principle of Management:  Education:  use of  lumbar roll, use of rolled towel to support neck for sleeping, L hand over head to take pressure off the nerve.  Centralization/peripheralization.  Purpose of HEP    Equipment provided:  none  Mechanical therapy (Y/N):  yes   Extension principle:       Lateral principle:  repeated SB L with nerve off tension 10 reps every 1-2 hours.  Prefer supine but ok standing if not able to lay down.   Flexion principle:     Other:        Assessment & Plan   CLINICAL IMPRESSIONS  Medical Diagnosis: neck pain    Treatment Diagnosis: cervical radiculopathy   Impression/Assessment: Patient is a 63 year old male with cervical complaints.  The following significant findings have been identified: Pain, Decreased ROM/flexibility, Decreased joint mobility, Decreased strength, Impaired muscle performance, Decreased activity tolerance, and Impaired posture. These impairments interfere with their ability to perform self care tasks, work tasks, recreational activities, and household chores as compared to previous level of function.     Clinical Decision Making (Complexity):  Clinical Presentation: Stable/Uncomplicated  Clinical Presentation Rationale: based on medical and personal factors listed in PT evaluation  Clinical Decision Making (Complexity): Low complexity    PLAN OF CARE  Treatment Interventions:  Interventions: Manual Therapy, Neuromuscular Re-education, Therapeutic Activity, Therapeutic Exercise, Self-Care/Home Management  Modalities if needed for pain control and/or increased muscle tension    Long Term Goals     PT Goal 1  Goal Identifier: lift with L hand/UE  Goal Description: patient will be able to  items with his L hand, hold dishes comfortably with L hand when washing dishes and full strength of L UE  Rationale: to maximize safety and independence with performance of ADLs and functional tasks;to maximize safety and independence within the home;to maximize safety and independence within the community;to maximize  safety and independence with self cares  Target Date: 04/09/24      Frequency of Treatment: 1-2 times a week  Duration of Treatment: 6 weeks, then dec frequency to 1 time a week for 6 weeks    Recommended Referrals to Other Professionals: Physical Therapy  Education Assessment:   Learner/Method: Patient;No Barriers to Learning  Education Comments: print HEP    Risks and benefits of evaluation/treatment have been explained.   Patient/Family/caregiver agrees with Plan of Care.     Evaluation Time:     PT Eval, Low Complexity Minutes (15764): 20   Present: Not applicable     Signing Clinician: Yulisa Melchor, PT

## 2024-01-17 ENCOUNTER — TRANSFERRED RECORDS (OUTPATIENT)
Dept: FAMILY MEDICINE | Facility: OTHER | Age: 64
End: 2024-01-17
Payer: COMMERCIAL

## 2024-01-23 ENCOUNTER — THERAPY VISIT (OUTPATIENT)
Dept: PHYSICAL THERAPY | Facility: CLINIC | Age: 64
End: 2024-01-23
Attending: STUDENT IN AN ORGANIZED HEALTH CARE EDUCATION/TRAINING PROGRAM
Payer: COMMERCIAL

## 2024-01-23 DIAGNOSIS — M54.12 CERVICAL RADICULOPATHY: Primary | ICD-10-CM

## 2024-01-23 PROCEDURE — 97110 THERAPEUTIC EXERCISES: CPT | Mod: GP | Performed by: PHYSICAL THERAPIST

## 2024-01-23 PROCEDURE — 97140 MANUAL THERAPY 1/> REGIONS: CPT | Mod: GP | Performed by: PHYSICAL THERAPIST

## 2024-01-26 ENCOUNTER — TELEPHONE (OUTPATIENT)
Dept: SCHEDULING | Facility: CLINIC | Age: 64
End: 2024-01-26
Payer: COMMERCIAL

## 2024-01-26 NOTE — TELEPHONE ENCOUNTER
Test Results    Contacts         Type Contact Phone/Fax    01/26/2024 03:35 PM CST Phone (Incoming) Alexsander Sin (Self) [phone] (M)            Who ordered the test:  Chiropractor - [facility] in [place], [place]    Type of test: Lab and MRI    Date of test:  1/16/24    Where was the test performed:  [facility], [place], [place]    What are your questions/concerns?:  Pt is calling to request results regarding his MRI. States he spoke to [facility] and was told the results were sent over to his PCP, he has no yet received anything.    Could we send this information to you in [facility] or would you prefer to receive a phone call?:   Patient would prefer a phone call   Okay to leave a detailed message?: Yes at Cell number on file:    Telephone Information:   Mobile [phone]

## 2024-01-29 NOTE — TELEPHONE ENCOUNTER
There is no MRI ordered for me, or are some recent x-rays, if MRI was ordered by a chiropractor he should contact them to discuss his results.

## 2024-01-30 ENCOUNTER — THERAPY VISIT (OUTPATIENT)
Dept: PHYSICAL THERAPY | Facility: CLINIC | Age: 64
End: 2024-01-30
Attending: STUDENT IN AN ORGANIZED HEALTH CARE EDUCATION/TRAINING PROGRAM
Payer: COMMERCIAL

## 2024-01-30 DIAGNOSIS — M54.12 CERVICAL RADICULOPATHY: Primary | ICD-10-CM

## 2024-01-30 PROCEDURE — 97110 THERAPEUTIC EXERCISES: CPT | Mod: GP | Performed by: PHYSICAL THERAPY ASSISTANT

## 2024-01-30 NOTE — TELEPHONE ENCOUNTER
Relayed info to patient. He said that he requested the mri results be sent to chiro and PCP as he wanted to know PCP opinion. Advised no results have been received. Gave pt fax number to give to outside facility

## 2024-01-31 ENCOUNTER — TELEPHONE (OUTPATIENT)
Dept: FAMILY MEDICINE | Facility: OTHER | Age: 64
End: 2024-01-31
Payer: COMMERCIAL

## 2024-01-31 NOTE — TELEPHONE ENCOUNTER
S: MRI results  B: Chiropractor ordered an MRI which was done on 01/17/2024  A: Patient is calling to get his results of his MRI that was done on 01/17/2024.  Results of MRI were scanned in this morning 01/31/2024.   R: Follow up with patient in regards to MRI results. Patient would also like a copy of report if possible.

## 2024-01-31 NOTE — TELEPHONE ENCOUNTER
Diagnostic report received. Placed in pcp bin for review. Can discard papers once reviewed. Already sent to scanning. Will call [facility] to see if images were pushed over.

## 2024-01-31 NOTE — TELEPHONE ENCOUNTER
MRI results looked over, could consider physical therapy  to assist in his signs/symptoms and/or pain. This may be of benefit.      Thank you,    Pérez Hubbard MD    [address] [address]   Ph: [phone]  Fax:[phone]

## 2024-02-01 NOTE — TELEPHONE ENCOUNTER
RN called and relayed provider message    Patient verbalized understanding and in agreement with plan of care.     Pt is working with PT already and reports it is working.     Emmie Cordova RN

## 2024-02-14 ENCOUNTER — THERAPY VISIT (OUTPATIENT)
Dept: PHYSICAL THERAPY | Facility: CLINIC | Age: 64
End: 2024-02-14
Payer: COMMERCIAL

## 2024-02-14 DIAGNOSIS — M54.12 CERVICAL RADICULOPATHY: Primary | ICD-10-CM

## 2024-02-14 PROCEDURE — 97140 MANUAL THERAPY 1/> REGIONS: CPT | Mod: GP | Performed by: PHYSICAL THERAPIST

## 2024-02-14 PROCEDURE — 97530 THERAPEUTIC ACTIVITIES: CPT | Mod: GP | Performed by: PHYSICAL THERAPIST

## 2024-02-14 PROCEDURE — 97110 THERAPEUTIC EXERCISES: CPT | Mod: GP | Performed by: PHYSICAL THERAPIST

## 2024-03-06 ENCOUNTER — THERAPY VISIT (OUTPATIENT)
Dept: PHYSICAL THERAPY | Facility: CLINIC | Age: 64
End: 2024-03-06
Payer: COMMERCIAL

## 2024-03-06 DIAGNOSIS — M54.12 CERVICAL RADICULOPATHY: Primary | ICD-10-CM

## 2024-03-06 PROCEDURE — 97110 THERAPEUTIC EXERCISES: CPT | Mod: GP | Performed by: PHYSICAL THERAPIST

## 2024-03-20 ENCOUNTER — THERAPY VISIT (OUTPATIENT)
Dept: PHYSICAL THERAPY | Facility: CLINIC | Age: 64
End: 2024-03-20
Payer: COMMERCIAL

## 2024-03-20 DIAGNOSIS — M79.622 PAIN OF LEFT UPPER ARM: ICD-10-CM

## 2024-03-20 DIAGNOSIS — M54.12 CERVICAL RADICULOPATHY: ICD-10-CM

## 2024-03-20 DIAGNOSIS — M54.12 CERVICAL RADICULOPATHY: Primary | ICD-10-CM

## 2024-03-20 DIAGNOSIS — M54.2 NECK PAIN: Primary | ICD-10-CM

## 2024-03-20 PROCEDURE — 97110 THERAPEUTIC EXERCISES: CPT | Mod: GP | Performed by: PHYSICAL THERAPIST

## 2024-03-20 NOTE — PROGRESS NOTES
03/20/24 0500   Appointment Info   Signing clinician's name / credentials Nguyễn Melchor, PT   Total/Authorized Visits eval and treat 8-12   Visits Used 6   Medical Diagnosis neck pain   PT Tx Diagnosis cervical radiculopathy   Progress Note/Certification   Onset of illness/injury or Date of Surgery 01/01/24   Therapy Frequency 1-2 times a week   Predicted Duration 6 weeks, then dec frequency to 1 time a week for 6 weeks   Progress Note Due Date 03/20/24   Progress Note Completed Date 01/16/24   Supervision   PT Assistant Visit Number 1       Present No   PT Goal 1   Goal Identifier lift with L hand/UE   Goal Description patient will be able to  items with his L hand, hold dishes comfortably with L hand when washing dishes and full strength of L UE   Rationale to maximize safety and independence with performance of ADLs and functional tasks;to maximize safety and independence within the home;to maximize safety and independence within the community;to maximize safety and independence with self cares   Goal Progress weakness of the L hand with lifting continues; not improving with regards to lifting  (sore over the past 2 weeks; resume extension biased ex as dec stiffness, and improved strength during PT)   Target Date 04/09/24   Subjective Report   Subjective Report Patient notes that he noticing numbness in the posterior shoulder into the proximal arm and ring/index fingers.  he is feeling a shooting pain, burning pain/fire like feeling with carrying something 5# or more.  He has been doing his exercises 3 times a day with out altering his symptoms.  Symptoms currently 3/10;  at worst over past couple weeks has been a 4-5/10.   Objective Measures   Objective Measures Objective Measure 1;Objective Measure 2;Objective Measure 3;Objective Measure 4;Objective Measure 5   Objective Measure 1   Objective Measure CROM   Details R rot 71, Rot L 75, SB R 41 strain on L, SB L 24 with sym L  neck, flexion 57 with PDM, Ext 60 deg NE   Objective Measure 2   Objective Measure MMT   Details dec strength and decreasing muscle bulk in the forearm.  fingers drift downward when assessing intrinsics.  ( (position 3) R hand dominant 110#; L hand 60#)   Objective Measure 3   Objective Measure  Strength   Details dec  R 11#, L 61# (one rep for each hand position #3; after supine neck retraction extension  strength improved R 125#, L 69#)   Objective Measure 4   Objective Measure functional   Details NDI has improved from 30% to 16%   Treatment Interventions (PT)   Interventions Therapeutic Procedure/Exercise   Therapeutic Procedure/Exercise   Therapeutic Procedures: strength, endurance, ROM, flexibility minutes (05131) 38   PTRx Ther Proc 1 Cervical Retraction   PTRx Ther Proc 1 - Details 10 reps - NE during NE after NE CROM   Skilled Intervention resume extension biased exercise as worse over past 2 weeks with assessing flexion.   Patient Response/Progress decreased neck symptoms and arm but no change in numbness for L ring and little finger after exercise.  Wrist extension strength increased but no change in hand strength.   PTRx Ther Proc 2 Cervical Retraction With Patient Overpressure   PTRx Ther Proc 2 - Details 10 reps dec tightness in neck ISQ arm inc rotation ROM no sig change SB L   PTRx Ther Proc 3 Seated Cervical Retraction Extension With Overpressure   PTRx Ther Proc 3 - Details 10 reps  - dec numbness B; inc SB to L.  inc strength for wrist ext; no change in  strength   PTRx Ther Proc 4 Supine Retraction/Extension With Overpressure   PTRx Ther Proc 4 - Details 10 reps - this felt better than in sitting as dec symptoms during better after for neck and arm no change for hand.  No change in  strength.  Wrist ext stronger at end of PT but no change in intrinsics   Therapeutic Activity   Ther Act 1 discussed MRI findings with patient and how the exercises we are doing can help to  improve the joint spaces where the nerves are exiting in the spine - 10 minutes   Ther Act 1 - Details answered all questions to the best of my ability   PTRx Ther Act 1 Posture Correction with Lumbar Roll   PTRx Ther Act 1 - Details No Notes   PTRx Ther Act 2 Understanding Mechanical Diagnosis and Therapy: Centralization   PTRx Ther Act 3 Sleeping Postures for the Neck   Skilled Intervention we did discuss possible consult with surgeon as strength is not remaining better and increasing muscle wasting in the forearm   Manual Therapy   Manual Therapy 1 - Details resume manual work if needed.   Education   Learner/Method Patient;No Barriers to Learning   Education Comments print HEP   Plan   Home program updated PTRx   Updates to plan of care retraction followed by retraction ext; did best supine ext   Plan for next session determine impact on sym, strength, ROM - assessing extension   Comments   Comments Patient does have a favorable response to cervical retraction extension exercises for the cervical radiculopathy.  He does not tolerate flexion biased exercise as they did increase his symptoms over the past 2 weeks (not sure if the exercise produced the numbness in the neck/L UT into proximal arm).  Today, his neck, shoulder and upper arm symptoms improved but the numbness of the L ring and little finger remained unchanged.  Overall, he is much better with regards to symptoms and range of motion with starting PT, but he is not making the strength gains needed to be able to do his every day activity at home (not able to hold 5# pan with the L hand when cooking is one example).   Total Session Time   Timed Code Treatment Minutes 38   Total Treatment Time (sum of timed and untimed services) 38       PLAN  Continue therapy per current plan of care.  Patient would benefit from a consultation with a specialist as the nerve impingement is continuing to effect his strength of the L hand.     Beginning/End Dates of Progress  Note Reporting Period:  01/16/24 to 03/20/2024    Referring Provider:  Pérez Hubbard

## 2024-04-12 ENCOUNTER — OFFICE VISIT (OUTPATIENT)
Dept: NEUROSURGERY | Facility: CLINIC | Age: 64
End: 2024-04-12
Attending: STUDENT IN AN ORGANIZED HEALTH CARE EDUCATION/TRAINING PROGRAM
Payer: COMMERCIAL

## 2024-04-12 VITALS
HEART RATE: 78 BPM | DIASTOLIC BLOOD PRESSURE: 91 MMHG | HEIGHT: 70 IN | SYSTOLIC BLOOD PRESSURE: 166 MMHG | BODY MASS INDEX: 27.2 KG/M2 | WEIGHT: 190 LBS

## 2024-04-12 DIAGNOSIS — M48.02 FORAMINAL STENOSIS OF CERVICAL REGION: ICD-10-CM

## 2024-04-12 DIAGNOSIS — R29.898 LEFT HAND WEAKNESS: Primary | ICD-10-CM

## 2024-04-12 DIAGNOSIS — M54.12 CERVICAL RADICULOPATHY: ICD-10-CM

## 2024-04-12 PROCEDURE — 99203 OFFICE O/P NEW LOW 30 MIN: CPT | Performed by: PHYSICIAN ASSISTANT

## 2024-04-12 ASSESSMENT — PAIN SCALES - GENERAL: PAINLEVEL: MILD PAIN (2)

## 2024-04-12 NOTE — LETTER
4/12/2024         RE: Alexsander Sin  [id number] [facility] [address]        Dear Colleague,    Thank you for referring your patient, Alexsander Sin, to the [facility] [place]. Please see a copy of my visit note below.    Neurosurgery Consult    HPI    Mr. Sin is a 63-year-old male who has had about 6 months of radicular pain initially in the neck and pain in the left elbow.  This was then followed by numbness in a C8 distribution initially in his fourth and fifth digits, now also extending up his arm into the lateral aspect of his upper arm.    He tried physical therapy without improvement.  He has not had an EMG.  He has not had any injections.  He had a cervical MRI in January.    He reports weakness primarily in his left hand.  He denies loss of dexterity in his right hand or weakness in his right hand.  Denies loss of coordination of his lower extremities.    Currently patient is not having much pain, but initially had neck pain and left arm pain, which is both largely dissipated at this time.      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  4-5 strength with wrist extension, 3- out of 5 strength with finger extension, 4 out of 5 strength with finger flexion, 3 out of 5 strength with fourth and fifth finger abduction.  Maris sign negative    Reflexes present triceps, biceps, brachioradialis on the left    Reflexes 1+ biceps triceps brachial radialis on the right    Bilateral lower extremities with 5/5 strength  Reflexes present patella/ankle    Negative ankle clonus negative Babinski bilaterally  Gait is normal    Negative Tinel's in the elbows  bilaterally  Negative Phalen's and Tinel's in the wrist bilaterally    Sensation decreased over the fourth and fifth digits in the left hand.    Imaging    Cervical MRI report demonstrates moderate foraminal stenosis on the left at C7-T1.  Also severe foraminal stenosis on the left at C5-6.    Images not available to review    Assessment    Left hand weakness  Foraminal stenosis C7-T1 and C5-6 on the left.  Symptoms most consistent with a C8 distribution.    Plan:      Recommend the patient attempt a trial of physical therapy with cervical traction and have a home unit issued if it is helpful.    We will also order a EMG of the left upper extremity to further diagnose whether this is cervical radiculopathy ulnar neuropathy or combination of both.  This can also help delineate if this is acute and ongoing or chronic nerve injury.    Follow-up with the patient with the results of the EMG when they are available.      Again, thank you for allowing me to participate in the care of your patient.        Sincerely,        Sylvester Adamson PA-C

## 2024-04-12 NOTE — PROGRESS NOTES
Neurosurgery Consult    HPI    Mr. Sin is a 63-year-old male who has had about 6 months of radicular pain initially in the neck and pain in the left elbow.  This was then followed by numbness in a C8 distribution initially in his fourth and fifth digits, now also extending up his arm into the lateral aspect of his upper arm.    He tried physical therapy without improvement.  He has not had an EMG.  He has not had any injections.  He had a cervical MRI in January.    He reports weakness primarily in his left hand.  He denies loss of dexterity in his right hand or weakness in his right hand.  Denies loss of coordination of his lower extremities.    Currently patient is not having much pain, but initially had neck pain and left arm pain, which is both largely dissipated at this time.      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  4-5 strength with wrist extension, 3- out of 5 strength with finger extension, 4 out of 5 strength with finger flexion, 3 out of 5 strength with fourth and fifth finger abduction.  Maris sign negative    Reflexes present triceps, biceps, brachioradialis on the left    Reflexes 1+ biceps triceps brachial radialis on the right    Bilateral lower extremities with 5/5 strength  Reflexes present patella/ankle    Negative ankle clonus negative Babinski bilaterally  Gait is normal    Negative Tinel's in the elbows bilaterally  Negative Phalen's and Tinel's in the wrist bilaterally    Sensation decreased over the fourth and fifth digits in the left hand.    Imaging    Cervical MRI report demonstrates moderate foraminal stenosis on the left at C7-T1.  Also severe foraminal stenosis on the left  at C5-6.    Images not available to review    Assessment    Left hand weakness  Foraminal stenosis C7-T1 and C5-6 on the left.  Symptoms most consistent with a C8 distribution.    Plan:      Recommend the patient attempt a trial of physical therapy with cervical traction and have a home unit issued if it is helpful.    We will also order a EMG of the left upper extremity to further diagnose whether this is cervical radiculopathy ulnar neuropathy or combination of both.  This can also help delineate if this is acute and ongoing or chronic nerve injury.    Follow-up with the patient with the results of the EMG when they are available.    Addendum 5/14/2024    Patient's EMG demonstrated chronic and active left C8 and T1 radiculopathies, patient has not yet tried cervical traction physical therapy, patient not interested in surgical evaluation at this time.  Recommend proceed with physical therapy cervical traction follow-up in 4 to 6 weeks with how symptoms are improving.

## 2024-04-13 ENCOUNTER — HEALTH MAINTENANCE LETTER (OUTPATIENT)
Age: 64
End: 2024-04-13

## 2024-04-30 ENCOUNTER — TRANSFERRED RECORDS (OUTPATIENT)
Dept: HEALTH INFORMATION MANAGEMENT | Facility: CLINIC | Age: 64
End: 2024-04-30
Payer: COMMERCIAL

## 2024-05-10 ENCOUNTER — TELEPHONE (OUTPATIENT)
Dept: FAMILY MEDICINE | Facility: OTHER | Age: 64
End: 2024-05-10
Payer: COMMERCIAL

## 2024-05-10 NOTE — TELEPHONE ENCOUNTER
Patient called to verify that his EMG results from [facility] have been scanned into patients chart. Writer verified that results were scanned into the chart on 04-.    Ольга Car RN on 5/10/2024 at 11:39 AM

## 2024-05-14 ENCOUNTER — TELEPHONE (OUTPATIENT)
Dept: NEUROLOGY | Facility: CLINIC | Age: 64
End: 2024-05-14

## 2024-05-14 NOTE — CONFIDENTIAL NOTE
"We received a Epic secure chat message from the pulmonology team about a VM message that was left on their line upstairs.       \"[facility] neuro team! Hope all is well! This pt left a VM 05/09 on the pulmonary line requesting his test results from [facility]. He did not specify which testing he is referring to. Can someone please check into this? Thanks so much!\"    Care Everywhere was accessed and the EMG report was uploaded. Encounter routed to ABDI Horne, to review.    Tosin Samano RN Care Coordinator   Neurology/Neurosurgery/PM&R/ Pain Management     "

## 2024-05-19 PROBLEM — M54.12 CERVICAL RADICULOPATHY: Status: RESOLVED | Noted: 2024-01-16 | Resolved: 2024-05-19

## 2024-05-20 NOTE — PROGRESS NOTES
Patient did not return for further treatment. Please refer to the progress note and goal flowsheet completed on 3/20/2024, for discharge information.

## 2024-05-27 DIAGNOSIS — I10 BENIGN ESSENTIAL HYPERTENSION: ICD-10-CM

## 2024-05-28 RX ORDER — LOSARTAN POTASSIUM 100 MG/1
100 TABLET ORAL DAILY
Qty: 90 TABLET | Refills: 0 | Status: SHIPPED | OUTPATIENT
Start: 2024-05-28

## 2024-07-09 ENCOUNTER — PATIENT OUTREACH (OUTPATIENT)
Dept: CARE COORDINATION | Facility: CLINIC | Age: 64
End: 2024-07-09
Payer: COMMERCIAL

## 2024-07-23 ENCOUNTER — PATIENT OUTREACH (OUTPATIENT)
Dept: CARE COORDINATION | Facility: CLINIC | Age: 64
End: 2024-07-23
Payer: COMMERCIAL

## 2024-08-31 ENCOUNTER — HEALTH MAINTENANCE LETTER (OUTPATIENT)
Age: 64
End: 2024-08-31

## 2024-10-13 DIAGNOSIS — K21.00 GASTROESOPHAGEAL REFLUX DISEASE WITH ESOPHAGITIS WITHOUT HEMORRHAGE: ICD-10-CM

## 2024-10-13 DIAGNOSIS — I10 BENIGN ESSENTIAL HYPERTENSION: ICD-10-CM

## 2024-10-14 RX ORDER — OMEPRAZOLE 40 MG/1
40 CAPSULE, DELAYED RELEASE ORAL DAILY
Qty: 90 CAPSULE | Refills: 0 | Status: SHIPPED | OUTPATIENT
Start: 2024-10-14

## 2024-10-14 RX ORDER — LOSARTAN POTASSIUM 100 MG/1
100 TABLET ORAL DAILY
Qty: 30 TABLET | Refills: 0 | Status: SHIPPED | OUTPATIENT
Start: 2024-10-14

## 2024-10-17 NOTE — TELEPHONE ENCOUNTER
Alexsander has been scheduled for his annual physical/medication follow up visit 11/26/24.    PROVIDER:[TOKEN:[[id number]],FOLLOWUP:[1 week]] PROVIDER:[TOKEN:[[id number]],FOLLOWUP:[1 week]],PROVIDER:[TOKEN:[[id number]],FOLLOWUP:[1-3 days]]

## 2024-11-14 DIAGNOSIS — I10 BENIGN ESSENTIAL HYPERTENSION: ICD-10-CM

## 2024-11-15 RX ORDER — LOSARTAN POTASSIUM 100 MG/1
100 TABLET ORAL DAILY
Qty: 30 TABLET | Refills: 0 | Status: SHIPPED | OUTPATIENT
Start: 2024-11-15

## 2024-11-26 ENCOUNTER — OFFICE VISIT (OUTPATIENT)
Dept: FAMILY MEDICINE | Facility: OTHER | Age: 64
End: 2024-11-26
Payer: COMMERCIAL

## 2024-11-26 VITALS
BODY MASS INDEX: 30.36 KG/M2 | OXYGEN SATURATION: 98 % | HEIGHT: 69 IN | WEIGHT: 205 LBS | HEART RATE: 65 BPM | DIASTOLIC BLOOD PRESSURE: 76 MMHG | SYSTOLIC BLOOD PRESSURE: 132 MMHG | TEMPERATURE: 98.3 F | RESPIRATION RATE: 15 BRPM

## 2024-11-26 DIAGNOSIS — Z12.5 SCREENING FOR PROSTATE CANCER: ICD-10-CM

## 2024-11-26 DIAGNOSIS — K21.00 GASTROESOPHAGEAL REFLUX DISEASE WITH ESOPHAGITIS WITHOUT HEMORRHAGE: ICD-10-CM

## 2024-11-26 DIAGNOSIS — I10 BENIGN ESSENTIAL HYPERTENSION: ICD-10-CM

## 2024-11-26 DIAGNOSIS — Z00.00 ROUTINE GENERAL MEDICAL EXAMINATION AT A HEALTH CARE FACILITY: Primary | ICD-10-CM

## 2024-11-26 DIAGNOSIS — J44.9 CHRONIC OBSTRUCTIVE PULMONARY DISEASE, UNSPECIFIED COPD TYPE (H): ICD-10-CM

## 2024-11-26 DIAGNOSIS — E11.9 TYPE 2 DIABETES MELLITUS WITHOUT COMPLICATION, WITHOUT LONG-TERM CURRENT USE OF INSULIN (H): ICD-10-CM

## 2024-11-26 LAB
CHOLEST SERPL-MCNC: 178 MG/DL
CREAT UR-MCNC: 97.5 MG/DL
EST. AVERAGE GLUCOSE BLD GHB EST-MCNC: 174 MG/DL
FASTING STATUS PATIENT QL REPORTED: YES
HBA1C MFR BLD: 7.7 % (ref 0–5.6)
HDLC SERPL-MCNC: 51 MG/DL
LDLC SERPL CALC-MCNC: 106 MG/DL
MICROALBUMIN UR-MCNC: <12 MG/L
MICROALBUMIN/CREAT UR: NORMAL MG/G{CREAT}
NONHDLC SERPL-MCNC: 127 MG/DL
PSA SERPL DL<=0.01 NG/ML-MCNC: 1.43 NG/ML (ref 0–4.5)
TRIGL SERPL-MCNC: 107 MG/DL

## 2024-11-26 PROCEDURE — 36415 COLL VENOUS BLD VENIPUNCTURE: CPT | Performed by: STUDENT IN AN ORGANIZED HEALTH CARE EDUCATION/TRAINING PROGRAM

## 2024-11-26 PROCEDURE — 83036 HEMOGLOBIN GLYCOSYLATED A1C: CPT | Performed by: STUDENT IN AN ORGANIZED HEALTH CARE EDUCATION/TRAINING PROGRAM

## 2024-11-26 PROCEDURE — 82043 UR ALBUMIN QUANTITATIVE: CPT | Performed by: STUDENT IN AN ORGANIZED HEALTH CARE EDUCATION/TRAINING PROGRAM

## 2024-11-26 PROCEDURE — 82570 ASSAY OF URINE CREATININE: CPT | Performed by: STUDENT IN AN ORGANIZED HEALTH CARE EDUCATION/TRAINING PROGRAM

## 2024-11-26 PROCEDURE — 80061 LIPID PANEL: CPT | Performed by: STUDENT IN AN ORGANIZED HEALTH CARE EDUCATION/TRAINING PROGRAM

## 2024-11-26 PROCEDURE — 99396 PREV VISIT EST AGE 40-64: CPT | Performed by: STUDENT IN AN ORGANIZED HEALTH CARE EDUCATION/TRAINING PROGRAM

## 2024-11-26 PROCEDURE — G0103 PSA SCREENING: HCPCS | Performed by: STUDENT IN AN ORGANIZED HEALTH CARE EDUCATION/TRAINING PROGRAM

## 2024-11-26 PROCEDURE — 99214 OFFICE O/P EST MOD 30 MIN: CPT | Mod: 25 | Performed by: STUDENT IN AN ORGANIZED HEALTH CARE EDUCATION/TRAINING PROGRAM

## 2024-11-26 RX ORDER — OMEPRAZOLE 40 MG/1
40 CAPSULE, DELAYED RELEASE ORAL DAILY
Qty: 90 CAPSULE | Refills: 3 | Status: SHIPPED | OUTPATIENT
Start: 2024-11-26

## 2024-11-26 RX ORDER — ATORVASTATIN CALCIUM 40 MG/1
40 TABLET, FILM COATED ORAL DAILY
Qty: 90 TABLET | Refills: 3 | Status: SHIPPED | OUTPATIENT
Start: 2024-11-26

## 2024-11-26 RX ORDER — METFORMIN HYDROCHLORIDE 500 MG/1
TABLET, EXTENDED RELEASE ORAL
Qty: 282 TABLET | Refills: 0 | Status: SHIPPED | OUTPATIENT
Start: 2024-11-26 | End: 2025-02-21

## 2024-11-26 RX ORDER — LOSARTAN POTASSIUM 100 MG/1
100 TABLET ORAL DAILY
Qty: 90 TABLET | Refills: 3 | Status: SHIPPED | OUTPATIENT
Start: 2024-11-26

## 2024-11-26 RX ORDER — ALBUTEROL SULFATE 90 UG/1
2 INHALANT RESPIRATORY (INHALATION) EVERY 6 HOURS PRN
Qty: 18 G | Refills: 1 | Status: SHIPPED | OUTPATIENT
Start: 2024-11-26

## 2024-11-26 SDOH — HEALTH STABILITY: PHYSICAL HEALTH: ON AVERAGE, HOW MANY DAYS PER WEEK DO YOU ENGAGE IN MODERATE TO STRENUOUS EXERCISE (LIKE A BRISK WALK)?: 5 DAYS

## 2024-11-26 ASSESSMENT — SOCIAL DETERMINANTS OF HEALTH (SDOH): HOW OFTEN DO YOU GET TOGETHER WITH FRIENDS OR RELATIVES?: ONCE A WEEK

## 2024-11-26 ASSESSMENT — PAIN SCALES - GENERAL: PAINLEVEL_OUTOF10: MODERATE PAIN (4)

## 2024-11-26 NOTE — PROGRESS NOTES
"Preventive Care Visit  [facility]  DEBORA FONTAINE MD, Family Medicine  Nov 26, 2024      Assessment & Plan     Routine general medical examination at a health care facility  Screening for prostate cancer  - PSA, screen  Health maintenance reviewed and updated, consideration of updating vaccines at the pharmacy    Type 2 diabetes mellitus without complication, without long-term current use of insulin (H)  A1c is elevated once again at 7.7, discussed at length with the patient that he should resume his metformin.  Patient is motivated to correct his blood sugar with diet and exercise but throughout this year has unable to do so, A1c's have been consistently above 7.  Metformin sent to the pharmacy once again, plan to see him back in 3 to 6 months.  Did discuss about statin medication which she does agree with, risks and benefits as well as side effects were discussed.  - HEMOGLOBIN A1C  - Lipid panel reflex to direct LDL Fasting  - Albumin Random Urine Quantitative with Creat Ratio  - atorvastatin (LIPITOR) 40 MG tablet; Take 1 tablet (40 mg) by mouth daily.    Chronic obstructive pulmonary disease, unspecified COPD type (H)  Stable with no current concerns, utilizes albuterol quite rarely   - albuterol (PROAIR HFA/PROVENTIL HFA/VENTOLIN HFA) 108 (90 Base) MCG/ACT inhaler; Inhale 2 puffs into the lungs every 6 hours as needed for shortness of breath, wheezing or cough.    Gastroesophageal reflux disease with esophagitis without hemorrhage   Stable with no concerns  - omeprazole (PRILOSEC) 40 MG DR capsule; Take 1 capsule (40 mg) by mouth daily.    Benign essential hypertension  Stable with no concerns  - losartan (COZAAR) 100 MG tablet; Take 1 tablet (100 mg) by mouth daily.            BMI  Estimated body mass index is 30.71 kg/m  as calculated from the following:    Height as of this encounter: 1.74 m (5' 8.5\").    Weight as of this encounter: 93 kg (205 lb).   Weight management plan: " Discussed healthy diet and exercise guidelines    Counseling  Appropriate preventive services were addressed with this patient via screening, questionnaire, or discussion as appropriate for fall prevention, nutrition, physical activity, Tobacco-use cessation, social engagement, weight loss and cognition.  Checklist reviewing preventive services available has been given to the patient.  Reviewed patient's diet, addressing concerns and/or questions.   The patient was instructed to see the dentist every 6 months.   He is at risk for psychosocial distress and has been provided with information to reduce risk.           Isabella Beltre is a 64 year old, presenting for the following:  Wellness Visit        11/26/2024     8:20 AM   Additional Questions   Roomed by toribio   Accompanied by self        Via the Health Maintenance questionnaire, the patient has reported the following services have been completed -Eye Exam: [facility] 2023-12-14, this information has been sent to the abstraction team.    HPI      Health Care Directive  Patient does not have a Health Care Directive: Discussed advance care planning with patient; information given to patient to review.      11/26/2024   General Health   How would you rate your overall physical health? Good   Feel stress (tense, anxious, or unable to sleep) To some extent      (!) STRESS CONCERN      11/26/2024   Nutrition   Three or more servings of calcium each day? Yes   Diet: Regular (no restrictions)   How many servings of fruit and vegetables per day? (!) 0-1   How many sweetened beverages each day? (!) 3            11/26/2024   Exercise   Days per week of moderate/strenous exercise 5 days            11/26/2024   Social Factors   Frequency of gathering with friends or relatives Once a week   Worry food won't last until get money to buy more No   Food not last or not have enough money for food? No   Do you have housing? (Housing is defined as stable permanent housing and  "does not include staying ouside in a car, in a tent, in an abandoned building, in an overnight shelter, or couch-surfing.) Yes   Are you worried about losing your housing? No   Lack of transportation? No   Unable to get utilities (heat,electricity)? No            11/26/2024   Fall Risk   Fallen 2 or more times in the past year? No    Trouble with walking or balance? No        Patient-reported          11/26/2024   Dental   Dentist two times every year? (!) NO            11/26/2024   TB Screening   Were you born outside of the [place]? No            Today's PHQ-2 Score:       11/26/2024     8:01 AM   PHQ-2 ( 1999 [employer])   Q1: Little interest or pleasure in doing things 1    Q2: Feeling down, depressed or hopeless 1    PHQ-2 Score 2    Q1: Little interest or pleasure in doing things Several days   Q2: Feeling down, depressed or hopeless Several days   PHQ-2 Score 2       Patient-reported           11/26/2024   Substance Use   Alcohol more than 3/day or more than 7/wk Not Applicable   Do you use any other substances recreationally? No        Social History     Tobacco Use    Smoking status: Never    Smokeless tobacco: Never   Vaping Use    Vaping status: Never Used   Substance Use Topics    Alcohol use: Not Currently     Comment: very rare    Drug use: No           11/26/2024   STI Screening   New sexual partner(s) since last STI/HIV test? No      Last PSA: No results found for: \"PSA\"  ASCVD Risk   The 10-year ASCVD risk score (Juan Carlos MAURICE, et al., 2019) is: 25.7%    Values used to calculate the score:      Age: 64 years      Sex: Male      Is Non- : No      Diabetic: Yes      Tobacco smoker: No      Systolic Blood Pressure: 132 mmHg      Is BP treated: Yes      HDL Cholesterol: 45 mg/dL      Total Cholesterol: 200 mg/dL           Reviewed and updated as needed this visit by Provider                          Review of Systems  Constitutional, HEENT, cardiovascular, pulmonary, gi and gu " "systems are negative, except as otherwise noted.     Objective    Exam  /76   Pulse 65   Temp 98.3  F (36.8  C) (Temporal)   Resp 15   Ht 1.74 m (5' 8.5\")   Wt 93 kg (205 lb)   SpO2 98%   BMI 30.71 kg/m     Estimated body mass index is 30.71 kg/m  as calculated from the following:    Height as of this encounter: 1.74 m (5' 8.5\").    Weight as of this encounter: 93 kg (205 lb).    Physical Exam  GENERAL: alert and no distress  EYES: Eyes grossly normal to inspection, PERRL and conjunctivae and sclerae normal  HENT: ear canals and TM's normal, nose and mouth without ulcers or lesions  NECK: no adenopathy, no asymmetry, masses, or scars  RESP: lungs clear to auscultation - no rales, rhonchi or wheezes  CV: regular rate and rhythm, normal S1 S2, no S3 or S4, no murmur, click or rub, no peripheral edema  ABDOMEN: soft, nontender, no hepatosplenomegaly, no masses and bowel sounds normal  MS: no gross musculoskeletal defects noted, no edema  SKIN: no suspicious lesions or rashes  NEURO: Normal strength and tone, mentation intact and speech normal  PSYCH: mentation appears normal, affect normal/bright        Signed Electronically by: DEBORA FONTAINE MD    "

## 2024-11-26 NOTE — PATIENT INSTRUCTIONS
Patient Education   Preventive Care Advice   This is general advice given by our system to help you stay healthy. However, your care team may have specific advice just for you. Please talk to your care team about your preventive care needs.  Nutrition  Eat 5 or more servings of fruits and vegetables each day.  Try wheat bread, brown rice and whole grain pasta (instead of white bread, rice, and pasta).  Get enough calcium and vitamin D. Check the label on foods and aim for 100% of the RDA (recommended daily allowance).  Lifestyle  Exercise at least 150 minutes each week  (30 minutes a day, 5 days a week).  Do muscle strengthening activities 2 days a week. These help control your weight and prevent disease.  No smoking.  Wear sunscreen to prevent skin cancer.  Have a dental exam and cleaning every 6 months.  Yearly exams  See your health care team every year to talk about:  Any changes in your health.  Any medicines your care team has prescribed.  Preventive care, family planning, and ways to prevent chronic diseases.  Shots (vaccines)   HPV shots (up to age 26), if you've never had them before.  Hepatitis B shots (up to age 59), if you've never had them before.  COVID-19 shot: Get this shot when it's due.  Flu shot: Get a flu shot every year.  Tetanus shot: Get a tetanus shot every 10 years.  Pneumococcal, hepatitis A, and RSV shots: Ask your care team if you need these based on your risk.  Shingles shot (for age 50 and up)  General health tests  Diabetes screening:  Starting at age 35, Get screened for diabetes at least every 3 years.  If you are younger than age 35, ask your care team if you should be screened for diabetes.  Cholesterol test: At age 39, start having a cholesterol test every 5 years, or more often if advised.  Bone density scan (DEXA): At age 50, ask your care team if you should have this scan for osteoporosis (brittle bones).  Hepatitis C: Get tested at least once in your life.  STIs (sexually  transmitted infections)  Before age 24: Ask your care team if you should be screened for STIs.  After age 24: Get screened for STIs if you're at risk. You are at risk for STIs (including HIV) if:  You are sexually active with more than one person.  You don't use condoms every time.  You or a partner was diagnosed with a sexually transmitted infection.  If you are at risk for HIV, ask about PrEP medicine to prevent HIV.  Get tested for HIV at least once in your life, whether you are at risk for HIV or not.  Cancer screening tests  Cervical cancer screening: If you have a cervix, begin getting regular cervical cancer screening tests starting at age 21.  Breast cancer scan (mammogram): If you've ever had breasts, begin having regular mammograms starting at age 40. This is a scan to check for breast cancer.  Colon cancer screening: It is important to start screening for colon cancer at age 45.  Have a colonoscopy test every 10 years (or more often if you're at risk) Or, ask your provider about stool tests like a FIT test every year or Cologuard test every 3 years.  To learn more about your testing options, visit:   .  For help making a decision, visit:   https://bit.ly/rd65302.  Prostate cancer screening test: If you have a prostate, ask your care team if a prostate cancer screening test (PSA) at age 55 is right for you.  Lung cancer screening: If you are a current or former smoker ages 50 to 80, ask your care team if ongoing lung cancer screenings are right for you.  For informational purposes only. Not to replace the advice of your health care provider. Copyright   2023 [facility]. All rights reserved. Clinically reviewed by the [facility]itions [facility]. Realm [id number] - REV 01/24.  Learning About Stress  What is stress?     Stress is your body's response to a hard situation. Your body can have a physical, emotional, or mental response. Stress is a fact of life for most people, and it  affects everyone differently. What causes stress for you may not be stressful for someone else.  A lot of things can cause stress. You may feel stress when you go on a job interview, take a test, or run a race. This kind of short-term stress is normal and even useful. It can help you if you need to work hard or react quickly. For example, stress can help you finish an important job on time.  Long-term stress is caused by ongoing stressful situations or events. Examples of long-term stress include long-term health problems, ongoing problems at work, or conflicts in your family. Long-term stress can harm your health.  How does stress affect your health?  When you are stressed, your body responds as though you are in danger. It makes hormones that speed up your heart, make you breathe faster, and give you a burst of energy. This is called the fight-or-flight stress response. If the stress is over quickly, your body goes back to normal and no harm is done.  But if stress happens too often or lasts too long, it can have bad effects. Long-term stress can make you more likely to get sick, and it can make symptoms of some diseases worse. If you tense up when you are stressed, you may develop neck, shoulder, or low back pain. Stress is linked to high blood pressure and heart disease.  Stress also harms your emotional health. It can make you nguyễn, tense, or depressed. Your relationships may suffer, and you may not do well at work or school.  What can you do to manage stress?  You can try these things to help manage stress:   Do something active. Exercise or activity can help reduce stress. Walking is a great way to get started. Even everyday activities such as housecleaning or yard work can help.  Try yoga or mahogany chi. These techniques combine exercise and meditation. You may need some training at first to learn them.  Do something you enjoy. For example, listen to music or go to a movie. Practice your hobby or do volunteer  "work.  Meditate. This can help you relax, because you are not worrying about what happened before or what may happen in the future.  Do guided imagery. Imagine yourself in any setting that helps you feel calm. You can use online videos, books, or a teacher to guide you.  Do breathing exercises. For example:  From a standing position, bend forward from the waist with your knees slightly bent. Let your arms dangle close to the floor.  Breathe in slowly and deeply as you return to a standing position. Roll up slowly and lift your head last.  Hold your breath for just a few seconds in the standing position.  Breathe out slowly and bend forward from the waist.  Let your feelings out. Talk, laugh, cry, and express anger when you need to. Talking with supportive friends or family, a counselor, or a angeline leader about your feelings is a healthy way to relieve stress. Avoid discussing your feelings with people who make you feel worse.  Write. It may help to write about things that are bothering you. This helps you find out how much stress you feel and what is causing it. When you know this, you can find better ways to cope.  What can you do to prevent stress?  You might try some of these things to help prevent stress:  Manage your time. This helps you find time to do the things you want and need to do.  Get enough sleep. Your body recovers from the stresses of the day while you are sleeping.  Get support. Your family, friends, and community can make a difference in how you experience stress.  Limit your news feed. Avoid or limit time on social media or news that may make you feel stressed.  Do something active. Exercise or activity can help reduce stress. Walking is a great way to get started.  Where can you learn more?  Go to https://www.[employer].net/patiented  Enter N032 in the search box to learn more about \"Learning About Stress.\"  Current as of: October 24, 2023  Content Version: 14.2 2024 jigl. "   Care instructions adapted under license by your healthcare professional. If you have questions about a medical condition or this instruction, always ask your healthcare professional. [employer] disclaims any warranty or liability for your use of this information.

## 2024-11-26 NOTE — RESULT ENCOUNTER NOTE
Alexsander,    Here are your most recent lab results    Hemoglobin A1c is elevated at 7.7, I would recommend to resume your metformin which I placed a refill to your pharmacy.  We should plan to recheck things in about 3 to 6 months for reappointment    All other labs stable or within normal limits      If you have any questions feel free to call the clinic at [phone].      Thank you,    Pérez Hubbard MD

## 2024-12-16 ENCOUNTER — TRANSFERRED RECORDS (OUTPATIENT)
Dept: MULTI SPECIALTY CLINIC | Facility: CLINIC | Age: 64
End: 2024-12-16

## 2024-12-16 LAB — RETINOPATHY: NORMAL

## 2025-02-06 ENCOUNTER — OFFICE VISIT (OUTPATIENT)
Dept: FAMILY MEDICINE | Facility: OTHER | Age: 65
End: 2025-02-06
Payer: COMMERCIAL

## 2025-02-06 VITALS
BODY MASS INDEX: 30.81 KG/M2 | SYSTOLIC BLOOD PRESSURE: 138 MMHG | DIASTOLIC BLOOD PRESSURE: 70 MMHG | RESPIRATION RATE: 18 BRPM | HEART RATE: 84 BPM | TEMPERATURE: 97.3 F | OXYGEN SATURATION: 99 % | HEIGHT: 69 IN | WEIGHT: 208 LBS

## 2025-02-06 DIAGNOSIS — J20.9 ACUTE BRONCHITIS WITH SYMPTOMS > 10 DAYS: Primary | ICD-10-CM

## 2025-02-06 DIAGNOSIS — E11.9 TYPE 2 DIABETES MELLITUS WITHOUT COMPLICATION, WITHOUT LONG-TERM CURRENT USE OF INSULIN (H): ICD-10-CM

## 2025-02-06 DIAGNOSIS — J44.9 CHRONIC OBSTRUCTIVE PULMONARY DISEASE, UNSPECIFIED COPD TYPE (H): ICD-10-CM

## 2025-02-06 RX ORDER — PREDNISONE 20 MG/1
40 TABLET ORAL DAILY
Qty: 10 TABLET | Refills: 0 | Status: SHIPPED | OUTPATIENT
Start: 2025-02-06 | End: 2025-02-11

## 2025-02-06 RX ORDER — AZITHROMYCIN 250 MG/1
TABLET, FILM COATED ORAL
Qty: 6 TABLET | Refills: 0 | Status: SHIPPED | OUTPATIENT
Start: 2025-02-06 | End: 2025-02-11

## 2025-02-06 RX ORDER — BENZONATATE 100 MG/1
100 CAPSULE ORAL 3 TIMES DAILY PRN
Qty: 30 CAPSULE | Refills: 0 | Status: SHIPPED | OUTPATIENT
Start: 2025-02-06

## 2025-02-06 ASSESSMENT — PAIN SCALES - GENERAL: PAINLEVEL_OUTOF10: NO PAIN (0)

## 2025-02-06 ASSESSMENT — ENCOUNTER SYMPTOMS: COUGH: 1

## 2025-02-06 NOTE — PROGRESS NOTES
Assessment & Plan       ICD-10-CM    1. Acute bronchitis with symptoms > 10 days  J20.9 azithromycin (ZITHROMAX) 250 MG tablet     predniSONE (DELTASONE) 20 MG tablet     benzonatate (TESSALON) 100 MG capsule      2. Chronic obstructive pulmonary disease, unspecified COPD type (H)  J44.9       3. Type 2 diabetes mellitus without complication, without long-term current use of insulin (H)  E11.9           1-2. Given length of symptoms and concomitant COPD, will treat with azithromycin to take as directed. Will also prescribe a 5 day prednisone burst.  I recommend plenty of fluids along with breathing in warm, moist air/humidifer.   Can use over the counter Mucinex to help with congestion. Flonase nasal spray can also be helpful.  Honey and tea can help with cough. I will also prescribe Tessalon pearls to use 3 times daily as needed.   If symptoms are not improving within the next week, he should be seen again.       3. Managed by his PCP. He still has not tried metformin yet as he is worried about side effects. I assured him that this medication is safe and is a good tool to help his body metabolize glucose better along with lifestyle changes. He states he will give it a try and will also restart atorvastatin. He has a follow-up with his PCP in April.     Isabella Beltre is a 64 year old, presenting for the following health issues:  Cough        2/6/2025     1:08 PM   Additional Questions   Roomed by Latanya WARREN     Via the Health Maintenance questionnaire, the patient has reported the following services have been completed -Eye Exam: [facility] 2024-12-16, this information has been sent to the abstraction team.  History of Present Illness       Reason for visit:  Cough  Symptom onset:  3-4 weeks ago  Symptoms include:  Cough phelm  Symptom intensity:  Moderate  Symptom progression:  Staying the same  Had these symptoms before:  Yes  Has tried/received treatment for these symptoms:  Yes  Previous  "treatment was successful:  Yes  Prior treatment description:  Antibiotics / steroid  What makes it worse:  None  What makes it better:  None He is missing 1 dose(s) of medications per week.     He has noticed a productive cough for almost a month. The mucous started out clear and is now more of a green color. He has intermittent shortness of breath but not much wheezing. No fevers or chills. He has been using his albuterol a few times per day. His co-workers have been ill so he thinks he picked it up from them.       Review of Systems  Constitutional, HEENT, cardiovascular, pulmonary, gi and gu systems are negative, except as otherwise noted.      Objective    /70   Pulse 84   Temp 97.3  F (36.3  C) (Temporal)   Resp 18   Ht 1.74 m (5' 8.5\")   Wt 94.3 kg (208 lb)   SpO2 99%   BMI 31.16 kg/m    Body mass index is 31.16 kg/m .  Physical Exam   GENERAL: alert and no distress  EYES: Eyes grossly normal to inspection, PERRL and conjunctivae and sclerae normal  HENT: ear canals and TM's normal, nose mucosa mildly erythematous bilaterally, pharynx is erythematous without exudates  NECK: no adenopathy, no asymmetry, masses, or scars  RESP: lungs clear to auscultation - no rales, rhonchi or wheezes  CV: regular rate and rhythm, normal S1 S2, no S3 or S4, no murmur, click or rub, no peripheral edema          Signed Electronically by: Simeon Robb PA-C  "

## 2025-02-06 NOTE — PATIENT INSTRUCTIONS
Will treat you with azithromycin to take as directed.   I recommend plenty of fluids along with breathing in warm, moist air/humidifer.   Can use over the counter Mucinex to help with congestion. Flonase nasal spray can also be helpful.  Honey and tea can help with cough. I will also prescribe Tessalon pearls to use 3 times daily as needed.   If symptoms are not improving within the next week, you should be seen again.     Restart the atorvastatin and try metformin.

## 2025-03-02 ENCOUNTER — HEALTH MAINTENANCE LETTER (OUTPATIENT)
Age: 65
End: 2025-03-02

## 2025-04-24 ENCOUNTER — NURSE TRIAGE (OUTPATIENT)
Dept: NURSING | Facility: CLINIC | Age: 65
End: 2025-04-24
Payer: COMMERCIAL

## 2025-04-24 NOTE — TELEPHONE ENCOUNTER
Pt calling to request for an in-person visit with any available provider if PCP not available. His wife has no actual diagnosis of scabies but is getting treatment for it for months now.    Pt does not have any symptoms now, but reports occasional itching in random areas of his body. No rash. Pt would like to request for an appointment so he can have his skin checked from head-to-toe for any sign of scabies.    To care team: Any suggestions how to proceed with this? Since asymptomatic, FNA forwarding this to PCP for recommendations.  Please call pt [phone]    Saida Feliz RN/[place] Nurse Advisor      Reason for Disposition   Requesting regular office appointment    Protocols used: Information Only Call-A-AH

## 2025-04-24 NOTE — TELEPHONE ENCOUNTER
PCP out of office today and tomorrow.   Called and spoke with patient. Scheduled to be seen tomorrow to have skin checked for possible scabies.     Appointments in Next Year      Apr 25, 2025 9:00 AM  (Arrive by 8:40 AM)  Provider Visit with Felicitas Gaxiola PA-C  [facility] ([facility]) [phone]     Jo RAMIRESN, RN

## 2025-04-28 ENCOUNTER — PATIENT OUTREACH (OUTPATIENT)
Dept: CARE COORDINATION | Facility: CLINIC | Age: 65
End: 2025-04-28
Payer: COMMERCIAL

## 2025-04-30 ENCOUNTER — HOSPITAL ENCOUNTER (OUTPATIENT)
Dept: ULTRASOUND IMAGING | Facility: CLINIC | Age: 65
Discharge: HOME | End: 2025-04-30
Attending: PHYSICIAN ASSISTANT
Payer: COMMERCIAL

## 2025-04-30 DIAGNOSIS — N50.89 TESTICULAR SWELLING: ICD-10-CM

## 2025-04-30 PROCEDURE — 76870 US EXAM SCROTUM: CPT

## 2025-05-01 ENCOUNTER — TELEPHONE (OUTPATIENT)
Dept: FAMILY MEDICINE | Facility: CLINIC | Age: 65
End: 2025-05-01
Payer: COMMERCIAL

## 2025-05-01 DIAGNOSIS — N43.40 SPERMATOCELE: Primary | ICD-10-CM

## 2025-05-01 NOTE — TELEPHONE ENCOUNTER
Called patient and LM for him to call back for results.     Recent testicular US.  Results show enlarging spermatoceole. These are fluid filled benign cysts.  Because this is enlarging and causing some discomfort I would like him to see Urology and I will place a referral.  I am more than happy to speak with the patient if he has questions.  Thank you!  Lyric Gaxiola PA-C

## 2025-05-05 ENCOUNTER — OFFICE VISIT (OUTPATIENT)
Dept: FAMILY MEDICINE | Facility: OTHER | Age: 65
End: 2025-05-05
Payer: COMMERCIAL

## 2025-05-05 ENCOUNTER — PATIENT OUTREACH (OUTPATIENT)
Dept: CARE COORDINATION | Facility: CLINIC | Age: 65
End: 2025-05-05

## 2025-05-05 VITALS
WEIGHT: 207 LBS | TEMPERATURE: 98.3 F | DIASTOLIC BLOOD PRESSURE: 83 MMHG | SYSTOLIC BLOOD PRESSURE: 131 MMHG | RESPIRATION RATE: 16 BRPM | BODY MASS INDEX: 30.66 KG/M2 | HEART RATE: 71 BPM | HEIGHT: 69 IN | OXYGEN SATURATION: 99 %

## 2025-05-05 DIAGNOSIS — I10 BENIGN ESSENTIAL HYPERTENSION: ICD-10-CM

## 2025-05-05 DIAGNOSIS — E11.9 TYPE 2 DIABETES MELLITUS WITHOUT COMPLICATION, WITHOUT LONG-TERM CURRENT USE OF INSULIN (H): Primary | ICD-10-CM

## 2025-05-05 DIAGNOSIS — M54.12 CERVICAL RADICULOPATHY: ICD-10-CM

## 2025-05-05 DIAGNOSIS — M54.2 NECK PAIN: ICD-10-CM

## 2025-05-05 DIAGNOSIS — J44.9 CHRONIC OBSTRUCTIVE PULMONARY DISEASE, UNSPECIFIED COPD TYPE (H): ICD-10-CM

## 2025-05-05 DIAGNOSIS — R29.898 LEFT HAND WEAKNESS: ICD-10-CM

## 2025-05-05 LAB
ANION GAP SERPL CALCULATED.3IONS-SCNC: 8 MMOL/L (ref 7–15)
BUN SERPL-MCNC: 17 MG/DL (ref 8–23)
CALCIUM SERPL-MCNC: 9.7 MG/DL (ref 8.8–10.4)
CHLORIDE SERPL-SCNC: 104 MMOL/L (ref 98–107)
CREAT SERPL-MCNC: 0.99 MG/DL (ref 0.67–1.17)
EGFRCR SERPLBLD CKD-EPI 2021: 86 ML/MIN/1.73M2
EST. AVERAGE GLUCOSE BLD GHB EST-MCNC: 194 MG/DL
GLUCOSE SERPL-MCNC: 170 MG/DL (ref 70–99)
HBA1C MFR BLD: 8.4 % (ref 0–5.6)
HCO3 SERPL-SCNC: 30 MMOL/L (ref 22–29)
POTASSIUM SERPL-SCNC: 4.2 MMOL/L (ref 3.4–5.3)
SODIUM SERPL-SCNC: 142 MMOL/L (ref 135–145)

## 2025-05-05 PROCEDURE — 3075F SYST BP GE 130 - 139MM HG: CPT | Performed by: STUDENT IN AN ORGANIZED HEALTH CARE EDUCATION/TRAINING PROGRAM

## 2025-05-05 PROCEDURE — 99214 OFFICE O/P EST MOD 30 MIN: CPT | Performed by: STUDENT IN AN ORGANIZED HEALTH CARE EDUCATION/TRAINING PROGRAM

## 2025-05-05 PROCEDURE — 1126F AMNT PAIN NOTED NONE PRSNT: CPT | Performed by: STUDENT IN AN ORGANIZED HEALTH CARE EDUCATION/TRAINING PROGRAM

## 2025-05-05 PROCEDURE — 80048 BASIC METABOLIC PNL TOTAL CA: CPT | Performed by: STUDENT IN AN ORGANIZED HEALTH CARE EDUCATION/TRAINING PROGRAM

## 2025-05-05 PROCEDURE — 3079F DIAST BP 80-89 MM HG: CPT | Performed by: STUDENT IN AN ORGANIZED HEALTH CARE EDUCATION/TRAINING PROGRAM

## 2025-05-05 PROCEDURE — 83036 HEMOGLOBIN GLYCOSYLATED A1C: CPT | Performed by: STUDENT IN AN ORGANIZED HEALTH CARE EDUCATION/TRAINING PROGRAM

## 2025-05-05 PROCEDURE — 36415 COLL VENOUS BLD VENIPUNCTURE: CPT | Performed by: STUDENT IN AN ORGANIZED HEALTH CARE EDUCATION/TRAINING PROGRAM

## 2025-05-05 RX ORDER — METFORMIN HYDROCHLORIDE 500 MG/1
500 TABLET, EXTENDED RELEASE ORAL 2 TIMES DAILY WITH MEALS
Qty: 180 TABLET | Refills: 0 | Status: SHIPPED | OUTPATIENT
Start: 2025-05-05 | End: 2025-08-03

## 2025-05-05 ASSESSMENT — ASTHMA QUESTIONNAIRES
QUESTION_2 LAST FOUR WEEKS HOW OFTEN HAVE YOU HAD SHORTNESS OF BREATH: NOT AT ALL
QUESTION_5 LAST FOUR WEEKS HOW WOULD YOU RATE YOUR ASTHMA CONTROL: COMPLETELY CONTROLLED
ACT_TOTALSCORE: 25
QUESTION_4 LAST FOUR WEEKS HOW OFTEN HAVE YOU USED YOUR RESCUE INHALER OR NEBULIZER MEDICATION (SUCH AS ALBUTEROL): NOT AT ALL
QUESTION_3 LAST FOUR WEEKS HOW OFTEN DID YOUR ASTHMA SYMPTOMS (WHEEZING, COUGHING, SHORTNESS OF BREATH, CHEST TIGHTNESS OR PAIN) WAKE YOU UP AT NIGHT OR EARLIER THAN USUAL IN THE MORNING: NOT AT ALL
QUESTION_1 LAST FOUR WEEKS HOW MUCH OF THE TIME DID YOUR ASTHMA KEEP YOU FROM GETTING AS MUCH DONE AT WORK, SCHOOL OR AT HOME: NONE OF THE TIME

## 2025-05-05 ASSESSMENT — PAIN SCALES - GENERAL: PAINLEVEL_OUTOF10: NO PAIN (0)

## 2025-05-05 NOTE — PROGRESS NOTES
Assessment & Plan     Type 2 diabetes mellitus without complication, without long-term current use of insulin (H)  Due for A1c, encourage patient to take his metformin more consistently, has been off of it up until the last week or 2, now has since resumed.  A1c likely elevated today, continue with 3-month rechecks  - HEMOGLOBIN A1C  - metFORMIN (GLUCOPHAGE XR) 500 MG 24 hr tablet; Take 1 tablet (500 mg) by mouth 2 times daily (with meals).    Benign essential hypertension  Has also resumed his losartan, blood pressure more stable compared to 1 week ago  - BASIC METABOLIC PANEL    Chronic obstructive pulmonary disease, unspecified COPD type (H)  No concerns, albuterol as needed.  Rarely requires albuterol    Neck pain  - Adult Neurosurgery  Referral; Future  Cervical radiculopathy  - Adult Neurosurgery  Referral; Future  Left hand weakness  Previously followed up with neurosurgery a couple years ago, still has ongoing neck discomfort and left hand weakness, if anything hand weakness is worsening and patient wishes to rediscuss things over with neurosurgery team.  Previously completed a course of physical therapy without any benefit.  Also history of EMG.  Referral placed    The longitudinal plan of care for the diagnosis(es)/condition(s) as documented were addressed during this visit. Due to the added complexity in care, I will continue to support Alexsander in the subsequent management and with ongoing continuity of care.      Subjective   Alexsander is a 64 year old, presenting for the following health issues:  Diabetes      5/5/2025     7:44 AM   Additional Questions   Roomed by vikash   Accompanied by self         Diabetes Follow-up    How often are you checking your blood sugar? Not at all  What concerns do you have today about your diabetes? Other: Lab work, did not fast   Do you have any of these symptoms? (Select all that apply)  No numbness or tingling in feet.  No redness, sores or blisters on  "feet.  No complaints of excessive thirst.  No reports of blurry vision.  No significant changes to weight.      BP Readings from Last 2 Encounters:   05/05/25 131/83   04/25/25 (!) 160/80     Hemoglobin A1C (%)   Date Value   11/26/2024 7.7 (H)   01/10/2024 7.0 (H)     LDL Cholesterol Calculated (mg/dL)   Date Value   11/26/2024 106 (H)   07/28/2023 130 (H)   05/21/2021 102 (H)         Concern - Pain in neck and left hand  Onset: over a year  Description: constant tingling in left hand- pinky and ring finger, not able to straight hand out, feels weak to straight, neck pain is related to how patient sleeps   Intensity: mild  Progression of Symptoms:  same  Accompanying Signs & Symptoms: none  Previous history of similar problem: none  Precipitating factors:        Worsened by: typing, gripping items  Alleviating factors:        Improved by: none  Therapies tried and outcome: therapy       Review of Systems  Constitutional, HEENT, cardiovascular, pulmonary, gi and gu systems are negative, except as otherwise noted.      Objective    /83   Pulse 71   Temp 98.3  F (36.8  C) (Temporal)   Resp 16   Ht 1.745 m (5' 8.7\")   Wt 93.9 kg (207 lb)   SpO2 99%   BMI 30.84 kg/m    Body mass index is 30.84 kg/m .  Physical Exam  Vitals and nursing note reviewed.   Constitutional:       General: He is not in acute distress.     Appearance: Normal appearance. He is not ill-appearing, toxic-appearing or diaphoretic.   HENT:      Head: Normocephalic and atraumatic.      Right Ear: Tympanic membrane, ear canal and external ear normal. There is no impacted cerumen.      Left Ear: Tympanic membrane, ear canal and external ear normal. There is no impacted cerumen.      Nose: Nose normal. No congestion or rhinorrhea.      Mouth/Throat:      Mouth: Mucous membranes are moist.      Pharynx: Oropharynx is clear. No oropharyngeal exudate or posterior oropharyngeal erythema.   Eyes:      General:         Right eye: No discharge.    "      Left eye: No discharge.      Extraocular Movements: Extraocular movements intact.      Conjunctiva/sclera: Conjunctivae normal.      Pupils: Pupils are equal, round, and reactive to light.   Cardiovascular:      Rate and Rhythm: Normal rate and regular rhythm.      Heart sounds: No murmur heard.  Pulmonary:      Effort: Pulmonary effort is normal. No respiratory distress.      Breath sounds: Normal breath sounds.   Musculoskeletal:         General: Normal range of motion.      Cervical back: Normal range of motion.   Lymphadenopathy:      Cervical: No cervical adenopathy.   Neurological:      Mental Status: He is alert.   Psychiatric:         Mood and Affect: Mood normal.         Behavior: Behavior normal.         Thought Content: Thought content normal.                    Signed Electronically by: DEBORA FONTAINE MD

## 2025-05-06 DIAGNOSIS — M54.2 NECK PAIN: Primary | ICD-10-CM

## 2025-05-06 DIAGNOSIS — M54.12 CERVICAL RADICULOPATHY: ICD-10-CM

## 2025-05-06 DIAGNOSIS — R29.898 LEFT HAND WEAKNESS: ICD-10-CM

## 2025-05-06 NOTE — RESULT ENCOUNTER NOTE
Alexsander,    Your A1c results did go up which we expected, will continue with a plan as we discussed during your visit.      If you have any questions feel free to call the clinic at [phone].      Thank you,    Pérez Hubbard MD

## 2025-05-07 ENCOUNTER — PATIENT OUTREACH (OUTPATIENT)
Dept: CARE COORDINATION | Facility: CLINIC | Age: 65
End: 2025-05-07
Payer: COMMERCIAL

## 2025-05-20 ENCOUNTER — OFFICE VISIT (OUTPATIENT)
Dept: UROLOGY | Facility: CLINIC | Age: 65
End: 2025-05-20
Attending: PHYSICIAN ASSISTANT
Payer: COMMERCIAL

## 2025-05-20 VITALS
SYSTOLIC BLOOD PRESSURE: 150 MMHG | OXYGEN SATURATION: 99 % | DIASTOLIC BLOOD PRESSURE: 90 MMHG | HEART RATE: 85 BPM | RESPIRATION RATE: 18 BRPM | WEIGHT: 206.56 LBS | BODY MASS INDEX: 31.3 KG/M2 | HEIGHT: 68 IN | TEMPERATURE: 97.8 F

## 2025-05-20 DIAGNOSIS — N43.40 SPERMATOCELE: ICD-10-CM

## 2025-05-20 ASSESSMENT — PAIN SCALES - GENERAL: PAINLEVEL_OUTOF10: NO PAIN (0)

## 2025-05-20 NOTE — PROGRESS NOTES
S: Alexsander Sin is a pleasant 64 year old male  with history of DM 11 requesting to be seen by Pérez Hubbard for spermatocele.     History of Present Illness-Alexsander Sin, a 64-year-old male, reported having a spermatocele, which he first noticed approximately six years ago. He underwent an ultrasound about five years ago, which did not reveal any significant issues at that time. However, he noted that the cyst has since grown, approximately doubling in size, and is now about the size of a baseball. Despite its size, it does not cause him discomfort. He mentioned an increase in urination frequency, which he attributes to his third-shift work schedule and increased coffee consumption. He does not experience any straining or difficulty during urination. Alexsander expressed uncertainty about whether to pursue surgical intervention, as the cyst is benign and not currently causing harm. He is aware of the potential risks associated with surgery, including infection and possible complications affecting the testicle. His hemoglobin A1c level is elevated at 8.4, which may be a consideration if he decides to pursue surgery. Alexsander is contemplating whether to proceed with surgery now while he is active and healthy or to wait, acknowledging that dealing with the cyst may become more challenging as he ages.    Lab Results   Component Value Date    A1C 8.4 (H) 05/05/2025    A1C 7.7 (H) 11/26/2024    A1C 7.0 (H) 01/10/2024       Lab Results   Component Value Date    PSA 1.43 11/26/2024       Current Outpatient Medications   Medication Sig Dispense Refill    atorvastatin (LIPITOR) 40 MG tablet Take 1 tablet (40 mg) by mouth daily. 90 tablet 3    losartan (COZAAR) 100 MG tablet Take 1 tablet (100 mg) by mouth daily. 90 tablet 3    metFORMIN (GLUCOPHAGE XR) 500 MG 24 hr tablet Take 1 tablet (500 mg) by mouth 2 times daily (with meals). 180 tablet 0    omeprazole (PRILOSEC) 40 MG DR capsule Take 1 capsule (40 mg) by mouth  daily. 90 capsule 3     Allergies   Allergen Reactions    Hydrocodone-Acetaminophen     Sulfa Antibiotics Hives     Past Medical History:   Diagnosis Date    Hypertension     Malignant melanoma (H)     Squamous cell carcinoma of skin, unspecified      Past Surgical History:   Procedure Laterality Date    COLONOSCOPY N/A 6/25/2021    Procedure: Colonoscopy;  Surgeon: Jessica Davis MD;  Location:  GI    ESOPHAGOSCOPY, GASTROSCOPY, DUODENOSCOPY (EGD), COMBINED N/A 6/25/2021    Procedure: Esophagogastroduodenoscopy;  Surgeon: Jessica Davis MD;  Location:  GI    HERNIA REPAIR      ORTHOPEDIC SURGERY        Family History   Problem Relation Age of Onset    Hypertension Mother     Lung Cancer Mother     Diabetes Father         prediabetes    Esophageal Cancer Father     Breast Cancer Father     Colon Cancer Father     Dementia Maternal Grandmother         in 90's    Myocardial Infarction Maternal Grandfather         before 60    Breast Cancer Paternal Grandmother     Stomach Cancer Paternal Grandfather      Social History     Socioeconomic History    Marital status:    Tobacco Use    Smoking status: Never     Passive exposure: Never    Smokeless tobacco: Never   Vaping Use    Vaping status: Never Used   Substance and Sexual Activity    Alcohol use: Not Currently     Comment: very rare    Drug use: No    Sexual activity: Yes     Partners: Female   Other Topics Concern    Parent/sibling w/ CABG, MI or angioplasty before 65F 55M? Yes     Comment: Mother     Social Drivers of Health     Financial Resource Strain: Low Risk  (11/26/2024)    Financial Resource Strain     Within the past 12 months, have you or your family members you live with been unable to get utilities (heat, electricity) when it was really needed?: No   Food Insecurity: Low Risk  (11/26/2024)    Food Insecurity     Within the past 12 months, did you worry that your food would run out before you got money to buy more?: No     Within the past 12  months, did the food you bought just not last and you didn t have money to get more?: No   Transportation Needs: Low Risk  (11/26/2024)    Transportation Needs     Within the past 12 months, has lack of transportation kept you from medical appointments, getting your medicines, non-medical meetings or appointments, work, or from getting things that you need?: No   Physical Activity: Unknown (11/26/2024)    Exercise Vital Sign     Days of Exercise per Week: 5 days   Stress: Stress Concern Present (11/26/2024)    Citizen of [place] of Occupational Health - Occupational Stress Questionnaire     Feeling of Stress : To some extent   Social Connections: Unknown (11/26/2024)    Social Connection and Isolation Panel [NHANES]     Frequency of Social Gatherings with Friends and Family: Once a week   Interpersonal Safety: Low Risk  (11/26/2024)    Interpersonal Safety     Do you feel physically and emotionally safe where you currently live?: Yes     Within the past 12 months, have you been hit, slapped, kicked or otherwise physically hurt by someone?: No     Within the past 12 months, have you been humiliated or emotionally abused in other ways by your partner or ex-partner?: No   Housing Stability: Low Risk  (11/26/2024)    Housing Stability     Do you have housing? : Yes     Are you worried about losing your housing?: No       REVIEW OF SYSTEMS  =================  C: NEGATIVE for fever, chills, change in weight  I: NEGATIVE for worrisome rashes, moles or lesions  E/M: NEGATIVE for ear, mouth and throat problems  R: NEGATIVE for significant cough or SHORTNESS OF BREATH  CV:  NEGATIVE for chest pain, palpitations or peripheral edema  GI: NEGATIVE for nausea, abdominal pain, heartburn, or change in bowel habits  NEURO: NEGATIVE numbness/weakness  : see HPI  PSYCH: NEGATIVE depression/anxiety  LYmph: no new enlarged lymph nodes  Ortho: no new trauma/movements        Physical Exam:  BP (!) 150/90 (BP Location: Right arm, Patient  "Position: Sitting, Cuff Size: Adult Regular)   Pulse 85   Temp 97.8  F (36.6  C) (Temporal)   Resp 18   Ht 1.727 m (5' 8\")   Wt 93.7 kg (206 lb 9 oz)   SpO2 99%   BMI 31.41 kg/m      Patient is pleasant, in no acute distress, good general condition.  Heart:  negative, PMI normal  Lung: no evidence of respiratory distress    Abdomen: Soft, nondistended, non tender. No masses. No rebound or guarding.  :      Circumcised penis, no penile plaques or lesions.      Orthotopic location of the urethral meatus.     Scrotum normal.     Testicles of normal firmness and consistency, no masses     Epididymis, no tenderness with palpation, large cystic structure on left epididymis     Vas and cord normal bilaterally.    Skin: Warm and dry.  No redness.  Neuro: grossly normal  Musculaskeletal: moving all extremities  Psych normal mood and affect  Musculoskeletal  moving all extremities      Assessment/Plan:   Assessment & Plan  Spermatocele:  - Spermatocele confirmed. It is a benign cyst, possibly containing sperm and debris. There is no immediate harm, but it could impact fertility if it blocks or does not function correctly.  - Patient is informed about the option of surgery, which is a same-day procedure to remove the cyst. Surgery is not immediately necessary unless the condition becomes bothersome or increases in size. Patient advised to consult with a surgeon at the [facility] if surgery is desired. Patient opts to monitor the condition and skip surgery for now.  - Risks and side effects: Discussed risks of surgery include infection, potential loss of the testicle, impeded blood flow leading to atrophy, and post-operative swelling and pain.  Alexsander to follow up with Primary Care provider regarding elevated blood pressure.    BUTCH Jones CNP    Consent was obtained from the patient to use an AI documentation tool in the creation of this note.  Voice recognition software was also used.  There may " be associated transcribing errors.

## 2025-05-20 NOTE — PATIENT INSTRUCTIONS
"Moi Sin, it was nice to meet you!    Thank you for allowing us the privilege of caring for you. We hope we provided you with the excellent service you deserve.   Please let us know if there is anything else we can do for you.  We want you to be completely satisfied with your care experience.    To ensure the quality of our services, you may be receiving a patient satisfaction survey from an independent patient satisfaction monitoring company.    The greatest compliment you can give is a \"Likely to Recommend.\"    Your visit was with BUTCH Jones CNP today.    Instructions per today's visit:     Please let me know if or when you would like to discuss surgery I will place a referral for you.     ___________________________________________________________________________  Important contact and scheduling information:  Please call our contact center at [phone] to schedule your next appointments or to reach our nurse triage line.  Please call during clinic hours Monday through Friday 8:00a - 4:30p if you have questions.  You can contact us anytime via [employer] and we will reply during clinic hours.    Lab results will be communicated through My Chart or letter (if My Chart not used). Please call the clinic if you have not received communication after 1 week or if you have any questions.?  __________________________________________________________________________    If labs were ordered today:    Please make an appointment to have them drawn at your convenience.     To schedule the Lab Appointment using [employer]:  Select \"Schedule an Appointment\"  Select \"Lab Only\"  Answer simple questions about where you would like to be seen and your type of insurance  For \"Which locations work for you?, select the location and set up the appointment.      "

## 2025-05-21 ENCOUNTER — TELEPHONE (OUTPATIENT)
Dept: FAMILY MEDICINE | Facility: OTHER | Age: 65
End: 2025-05-21
Payer: COMMERCIAL

## 2025-05-21 DIAGNOSIS — E11.9 TYPE 2 DIABETES MELLITUS WITHOUT COMPLICATION, WITHOUT LONG-TERM CURRENT USE OF INSULIN (H): Primary | ICD-10-CM

## 2025-05-21 RX ORDER — LANCETS
EACH MISCELLANEOUS
Qty: 100 EACH | Refills: 6 | Status: SHIPPED | OUTPATIENT
Start: 2025-05-21

## 2025-05-21 NOTE — TELEPHONE ENCOUNTER
Pt is asking for a Glucose monitor and supplies to be sent to [facility]. Last seen 5/5.

## 2025-07-14 ENCOUNTER — OFFICE VISIT (OUTPATIENT)
Dept: FAMILY MEDICINE | Facility: CLINIC | Age: 65
End: 2025-07-14
Payer: COMMERCIAL

## 2025-07-14 VITALS
OXYGEN SATURATION: 100 % | RESPIRATION RATE: 10 BRPM | BODY MASS INDEX: 29.67 KG/M2 | HEIGHT: 68 IN | SYSTOLIC BLOOD PRESSURE: 132 MMHG | TEMPERATURE: 98.9 F | DIASTOLIC BLOOD PRESSURE: 78 MMHG | HEART RATE: 73 BPM | WEIGHT: 195.8 LBS

## 2025-07-14 DIAGNOSIS — D03.59 MELANOMA IN SITU OF BACK (H): ICD-10-CM

## 2025-07-14 DIAGNOSIS — N40.1 BENIGN PROSTATIC HYPERPLASIA WITH URINARY FREQUENCY: Primary | ICD-10-CM

## 2025-07-14 DIAGNOSIS — R35.0 BENIGN PROSTATIC HYPERPLASIA WITH URINARY FREQUENCY: Primary | ICD-10-CM

## 2025-07-14 LAB
ALBUMIN UR-MCNC: ABNORMAL MG/DL
APPEARANCE UR: CLEAR
BACTERIA #/AREA URNS HPF: ABNORMAL /HPF
BILIRUB UR QL STRIP: NEGATIVE
COLOR UR AUTO: YELLOW
GLUCOSE UR STRIP-MCNC: NEGATIVE MG/DL
HGB UR QL STRIP: NEGATIVE
KETONES UR STRIP-MCNC: NEGATIVE MG/DL
LEUKOCYTE ESTERASE UR QL STRIP: NEGATIVE
MUCOUS THREADS #/AREA URNS LPF: PRESENT /LPF
NITRATE UR QL: NEGATIVE
PH UR STRIP: 7 [PH] (ref 5–7)
RBC #/AREA URNS AUTO: ABNORMAL /HPF
SP GR UR STRIP: 1.02 (ref 1–1.03)
SQUAMOUS #/AREA URNS AUTO: ABNORMAL /LPF
UROBILINOGEN UR STRIP-ACNC: 0.2 E.U./DL
WBC #/AREA URNS AUTO: ABNORMAL /HPF

## 2025-07-14 PROCEDURE — 81001 URINALYSIS AUTO W/SCOPE: CPT | Performed by: PHYSICIAN ASSISTANT

## 2025-07-14 PROCEDURE — 3078F DIAST BP <80 MM HG: CPT | Performed by: PHYSICIAN ASSISTANT

## 2025-07-14 PROCEDURE — 3075F SYST BP GE 130 - 139MM HG: CPT | Performed by: PHYSICIAN ASSISTANT

## 2025-07-14 PROCEDURE — 1126F AMNT PAIN NOTED NONE PRSNT: CPT | Performed by: PHYSICIAN ASSISTANT

## 2025-07-14 PROCEDURE — 99214 OFFICE O/P EST MOD 30 MIN: CPT | Performed by: PHYSICIAN ASSISTANT

## 2025-07-14 RX ORDER — TAMSULOSIN HYDROCHLORIDE 0.4 MG/1
0.4 CAPSULE ORAL DAILY
Qty: 90 CAPSULE | Refills: 0 | Status: SHIPPED | OUTPATIENT
Start: 2025-07-14

## 2025-07-14 ASSESSMENT — PAIN SCALES - GENERAL: PAINLEVEL_OUTOF10: NO PAIN (0)

## 2025-07-14 NOTE — PATIENT INSTRUCTIONS
Instructions from Today's Visit:  Start the tamsulosin once daily.   Follow up with  in August as scheduled.       General Instructions After Your Visit  If you have been seen for a concern and are worsening or not improving as expected, please schedule a follow-up visit or reach out to a member of our care team or nurses if it is urgent.  We have Nurse advice available 24/7 by calling [phone].  For emergencies, please call [phone].    My Clinic Hours  I am in the office Mondays, Wednesdays, and Thursdays. Messages received outside of normal clinic hours and on my days out of the office will be reviewed by our [facility] care team, but may not be addressed by me personally until I am back in the office. If you have concerns that cannot wait for my return please call the Nurse advise line [phone].    Test results  You may see your lab or test results before we can make recommendations. This is common, as sometimes we are awaiting on other labs to return or we are out of office on a particular day. Please be patient, and if you don't see a response from me or one of my colleagues within 2-3 business days, and you have a specific concern, please send us a message.    Refills  If you have run out of refills, please schedule a visit. This is generally an indication that you are due for a follow-up visit. All prescriptions are only valid for 1 year from the date written and need a visit annually to renew. Most mental health and chronic diseases we are treating (diabetes, high blood pressure, etc) require some type of visit every 6 months. We are now offering video visits! However, if physical exams are needed or it is a complex concern, we may ask you to be seen in person.    Physicals & Preventative Visits   These appointment slots fill up fast. Please consider scheduling these 2-3 months in advance to allow for the appointment time that fits you best. If you have medications ordered or other issues  addressed that are not preventive at these visits, please be aware there are extra costs associated with this.
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
## 2025-08-05 ENCOUNTER — OFFICE VISIT (OUTPATIENT)
Dept: FAMILY MEDICINE | Facility: OTHER | Age: 65
End: 2025-08-05
Payer: COMMERCIAL

## 2025-08-05 VITALS
OXYGEN SATURATION: 97 % | BODY MASS INDEX: 29.7 KG/M2 | RESPIRATION RATE: 13 BRPM | HEIGHT: 68 IN | TEMPERATURE: 97.2 F | WEIGHT: 196 LBS | SYSTOLIC BLOOD PRESSURE: 115 MMHG | HEART RATE: 56 BPM | DIASTOLIC BLOOD PRESSURE: 72 MMHG

## 2025-08-05 DIAGNOSIS — N40.1 BENIGN PROSTATIC HYPERPLASIA WITH URINARY FREQUENCY: ICD-10-CM

## 2025-08-05 DIAGNOSIS — R35.0 BENIGN PROSTATIC HYPERPLASIA WITH URINARY FREQUENCY: ICD-10-CM

## 2025-08-05 DIAGNOSIS — E11.9 TYPE 2 DIABETES MELLITUS WITHOUT COMPLICATION, WITHOUT LONG-TERM CURRENT USE OF INSULIN (H): Primary | ICD-10-CM

## 2025-08-05 PROBLEM — J44.9 CHRONIC OBSTRUCTIVE PULMONARY DISEASE, UNSPECIFIED COPD TYPE (H): Status: RESOLVED | Noted: 2024-01-10 | Resolved: 2025-08-05

## 2025-08-05 LAB
EST. AVERAGE GLUCOSE BLD GHB EST-MCNC: 163 MG/DL
HBA1C MFR BLD: 7.3 % (ref 0–5.6)

## 2025-08-05 PROCEDURE — 3074F SYST BP LT 130 MM HG: CPT | Performed by: STUDENT IN AN ORGANIZED HEALTH CARE EDUCATION/TRAINING PROGRAM

## 2025-08-05 PROCEDURE — 99214 OFFICE O/P EST MOD 30 MIN: CPT | Performed by: STUDENT IN AN ORGANIZED HEALTH CARE EDUCATION/TRAINING PROGRAM

## 2025-08-05 PROCEDURE — 83036 HEMOGLOBIN GLYCOSYLATED A1C: CPT | Performed by: STUDENT IN AN ORGANIZED HEALTH CARE EDUCATION/TRAINING PROGRAM

## 2025-08-05 PROCEDURE — G2211 COMPLEX E/M VISIT ADD ON: HCPCS | Performed by: STUDENT IN AN ORGANIZED HEALTH CARE EDUCATION/TRAINING PROGRAM

## 2025-08-05 PROCEDURE — 1126F AMNT PAIN NOTED NONE PRSNT: CPT | Performed by: STUDENT IN AN ORGANIZED HEALTH CARE EDUCATION/TRAINING PROGRAM

## 2025-08-05 PROCEDURE — 36415 COLL VENOUS BLD VENIPUNCTURE: CPT | Performed by: STUDENT IN AN ORGANIZED HEALTH CARE EDUCATION/TRAINING PROGRAM

## 2025-08-05 PROCEDURE — 3078F DIAST BP <80 MM HG: CPT | Performed by: STUDENT IN AN ORGANIZED HEALTH CARE EDUCATION/TRAINING PROGRAM

## 2025-08-05 RX ORDER — ATORVASTATIN CALCIUM 40 MG/1
40 TABLET, FILM COATED ORAL DAILY
Qty: 90 TABLET | Refills: 3 | Status: SHIPPED | OUTPATIENT
Start: 2025-08-05

## 2025-08-05 RX ORDER — METFORMIN HYDROCHLORIDE 500 MG/1
500 TABLET, EXTENDED RELEASE ORAL 2 TIMES DAILY WITH MEALS
Qty: 180 TABLET | Refills: 0 | Status: SHIPPED | OUTPATIENT
Start: 2025-08-05

## 2025-08-05 RX ORDER — TAMSULOSIN HYDROCHLORIDE 0.4 MG/1
0.4 CAPSULE ORAL DAILY
Qty: 90 CAPSULE | Refills: 3 | Status: SHIPPED | OUTPATIENT
Start: 2025-08-05

## 2025-08-05 ASSESSMENT — PAIN SCALES - GENERAL: PAINLEVEL_OUTOF10: NO PAIN (0)

## 2025-08-06 ENCOUNTER — PATIENT OUTREACH (OUTPATIENT)
Dept: CARE COORDINATION | Facility: CLINIC | Age: 65
End: 2025-08-06
Payer: COMMERCIAL

## (undated) RX ORDER — FENTANYL CITRATE 50 UG/ML
INJECTION, SOLUTION INTRAMUSCULAR; INTRAVENOUS
Status: DISPENSED
Start: 2021-06-25